# Patient Record
Sex: FEMALE | Race: WHITE | NOT HISPANIC OR LATINO | Employment: OTHER | URBAN - NONMETROPOLITAN AREA
[De-identification: names, ages, dates, MRNs, and addresses within clinical notes are randomized per-mention and may not be internally consistent; named-entity substitution may affect disease eponyms.]

---

## 2017-01-03 ENCOUNTER — TRANSCRIBE ORDERS (OUTPATIENT)
Dept: LAB | Facility: MEDICAL CENTER | Age: 82
End: 2017-01-03

## 2017-01-03 ENCOUNTER — APPOINTMENT (OUTPATIENT)
Dept: LAB | Facility: MEDICAL CENTER | Age: 82
End: 2017-01-03
Payer: MEDICARE

## 2017-01-03 ENCOUNTER — GENERIC CONVERSION - ENCOUNTER (OUTPATIENT)
Dept: OTHER | Facility: OTHER | Age: 82
End: 2017-01-03

## 2017-01-03 DIAGNOSIS — I48.91 ATRIAL FIBRILLATION (HCC): ICD-10-CM

## 2017-01-03 LAB
INR PPP: 2.35 (ref 0.86–1.16)
PROTHROMBIN TIME: 25.4 SECONDS (ref 12–14.3)

## 2017-01-03 PROCEDURE — 85610 PROTHROMBIN TIME: CPT

## 2017-01-03 PROCEDURE — 36415 COLL VENOUS BLD VENIPUNCTURE: CPT

## 2017-01-07 DIAGNOSIS — I48.91 ATRIAL FIBRILLATION (HCC): ICD-10-CM

## 2017-01-30 ENCOUNTER — TRANSCRIBE ORDERS (OUTPATIENT)
Dept: LAB | Facility: MEDICAL CENTER | Age: 82
End: 2017-01-30

## 2017-01-30 ENCOUNTER — GENERIC CONVERSION - ENCOUNTER (OUTPATIENT)
Dept: OTHER | Facility: OTHER | Age: 82
End: 2017-01-30

## 2017-01-30 ENCOUNTER — APPOINTMENT (OUTPATIENT)
Dept: LAB | Facility: MEDICAL CENTER | Age: 82
End: 2017-01-30
Payer: MEDICARE

## 2017-01-30 DIAGNOSIS — I48.91 ATRIAL FIBRILLATION (HCC): ICD-10-CM

## 2017-01-30 LAB
INR PPP: 2.08 (ref 0.86–1.16)
PROTHROMBIN TIME: 23.2 SECONDS (ref 12–14.3)

## 2017-01-30 PROCEDURE — 85610 PROTHROMBIN TIME: CPT

## 2017-01-30 PROCEDURE — 36415 COLL VENOUS BLD VENIPUNCTURE: CPT

## 2017-02-07 DIAGNOSIS — I48.91 ATRIAL FIBRILLATION (HCC): ICD-10-CM

## 2017-02-27 ENCOUNTER — APPOINTMENT (OUTPATIENT)
Dept: LAB | Facility: MEDICAL CENTER | Age: 82
End: 2017-02-27
Payer: MEDICARE

## 2017-02-27 ENCOUNTER — TRANSCRIBE ORDERS (OUTPATIENT)
Dept: LAB | Facility: MEDICAL CENTER | Age: 82
End: 2017-02-27

## 2017-02-27 DIAGNOSIS — I48.91 ATRIAL FIBRILLATION (HCC): ICD-10-CM

## 2017-02-27 LAB
INR PPP: 1.87 (ref 0.86–1.16)
PROTHROMBIN TIME: 21.4 SECONDS (ref 12–14.3)

## 2017-02-27 PROCEDURE — 36415 COLL VENOUS BLD VENIPUNCTURE: CPT

## 2017-02-27 PROCEDURE — 85610 PROTHROMBIN TIME: CPT

## 2017-02-28 ENCOUNTER — GENERIC CONVERSION - ENCOUNTER (OUTPATIENT)
Dept: OTHER | Facility: OTHER | Age: 82
End: 2017-02-28

## 2017-03-15 ENCOUNTER — ALLSCRIPTS OFFICE VISIT (OUTPATIENT)
Dept: OTHER | Facility: OTHER | Age: 82
End: 2017-03-15

## 2017-03-27 ENCOUNTER — APPOINTMENT (OUTPATIENT)
Dept: LAB | Facility: MEDICAL CENTER | Age: 82
End: 2017-03-27
Payer: MEDICARE

## 2017-03-27 ENCOUNTER — TRANSCRIBE ORDERS (OUTPATIENT)
Dept: LAB | Facility: MEDICAL CENTER | Age: 82
End: 2017-03-27

## 2017-03-27 ENCOUNTER — GENERIC CONVERSION - ENCOUNTER (OUTPATIENT)
Dept: OTHER | Facility: OTHER | Age: 82
End: 2017-03-27

## 2017-03-27 DIAGNOSIS — I48.91 ATRIAL FIBRILLATION (HCC): ICD-10-CM

## 2017-03-27 LAB
INR PPP: 1.9 (ref 0.86–1.16)
PROTHROMBIN TIME: 21.6 SECONDS (ref 12–14.3)

## 2017-03-27 PROCEDURE — 36415 COLL VENOUS BLD VENIPUNCTURE: CPT

## 2017-03-27 PROCEDURE — 85610 PROTHROMBIN TIME: CPT

## 2017-04-01 DIAGNOSIS — I48.91 ATRIAL FIBRILLATION (HCC): ICD-10-CM

## 2017-04-24 ENCOUNTER — TRANSCRIBE ORDERS (OUTPATIENT)
Dept: LAB | Facility: MEDICAL CENTER | Age: 82
End: 2017-04-24

## 2017-04-24 ENCOUNTER — APPOINTMENT (OUTPATIENT)
Dept: LAB | Facility: MEDICAL CENTER | Age: 82
End: 2017-04-24
Payer: MEDICARE

## 2017-04-24 ENCOUNTER — GENERIC CONVERSION - ENCOUNTER (OUTPATIENT)
Dept: OTHER | Facility: OTHER | Age: 82
End: 2017-04-24

## 2017-04-24 DIAGNOSIS — I48.91 ATRIAL FIBRILLATION (HCC): ICD-10-CM

## 2017-04-24 LAB
INR PPP: 2.66 (ref 0.86–1.16)
PROTHROMBIN TIME: 27.9 SECONDS (ref 12–14.3)

## 2017-04-24 PROCEDURE — 36415 COLL VENOUS BLD VENIPUNCTURE: CPT

## 2017-04-24 PROCEDURE — 85610 PROTHROMBIN TIME: CPT

## 2017-05-07 DIAGNOSIS — I48.91 ATRIAL FIBRILLATION (HCC): ICD-10-CM

## 2017-05-22 ENCOUNTER — GENERIC CONVERSION - ENCOUNTER (OUTPATIENT)
Dept: OTHER | Facility: OTHER | Age: 82
End: 2017-05-22

## 2017-05-22 ENCOUNTER — APPOINTMENT (OUTPATIENT)
Dept: LAB | Facility: MEDICAL CENTER | Age: 82
End: 2017-05-22
Payer: MEDICARE

## 2017-05-22 ENCOUNTER — TRANSCRIBE ORDERS (OUTPATIENT)
Dept: LAB | Facility: MEDICAL CENTER | Age: 82
End: 2017-05-22

## 2017-05-22 DIAGNOSIS — I48.91 ATRIAL FIBRILLATION (HCC): ICD-10-CM

## 2017-05-22 LAB
INR PPP: 2.24 (ref 0.86–1.16)
PROTHROMBIN TIME: 25 SECONDS (ref 12.1–14.4)

## 2017-05-22 PROCEDURE — 36415 COLL VENOUS BLD VENIPUNCTURE: CPT

## 2017-05-22 PROCEDURE — 85610 PROTHROMBIN TIME: CPT

## 2017-06-07 ENCOUNTER — ALLSCRIPTS OFFICE VISIT (OUTPATIENT)
Dept: OTHER | Facility: OTHER | Age: 82
End: 2017-06-07

## 2017-06-07 DIAGNOSIS — I48.91 ATRIAL FIBRILLATION (HCC): ICD-10-CM

## 2017-06-14 ENCOUNTER — GENERIC CONVERSION - ENCOUNTER (OUTPATIENT)
Dept: OTHER | Facility: OTHER | Age: 82
End: 2017-06-14

## 2017-06-19 ENCOUNTER — APPOINTMENT (OUTPATIENT)
Dept: LAB | Facility: MEDICAL CENTER | Age: 82
End: 2017-06-19
Payer: MEDICARE

## 2017-06-19 ENCOUNTER — TRANSCRIBE ORDERS (OUTPATIENT)
Dept: LAB | Facility: MEDICAL CENTER | Age: 82
End: 2017-06-19

## 2017-06-19 DIAGNOSIS — I48.91 ATRIAL FIBRILLATION (HCC): ICD-10-CM

## 2017-06-19 LAB
INR PPP: 2.24 (ref 0.86–1.16)
PROTHROMBIN TIME: 25 SECONDS (ref 12.1–14.4)

## 2017-06-19 PROCEDURE — 85610 PROTHROMBIN TIME: CPT

## 2017-06-19 PROCEDURE — 36415 COLL VENOUS BLD VENIPUNCTURE: CPT

## 2017-06-20 ENCOUNTER — GENERIC CONVERSION - ENCOUNTER (OUTPATIENT)
Dept: OTHER | Facility: OTHER | Age: 82
End: 2017-06-20

## 2017-07-17 ENCOUNTER — APPOINTMENT (OUTPATIENT)
Dept: LAB | Facility: MEDICAL CENTER | Age: 82
End: 2017-07-17
Payer: MEDICARE

## 2017-07-17 ENCOUNTER — TRANSCRIBE ORDERS (OUTPATIENT)
Dept: LAB | Facility: MEDICAL CENTER | Age: 82
End: 2017-07-17

## 2017-07-17 DIAGNOSIS — I48.91 ATRIAL FIBRILLATION (HCC): ICD-10-CM

## 2017-07-17 LAB
INR PPP: 1.8 (ref 0.86–1.16)
PROTHROMBIN TIME: 21 SECONDS (ref 12.1–14.4)

## 2017-07-17 PROCEDURE — 36415 COLL VENOUS BLD VENIPUNCTURE: CPT

## 2017-07-17 PROCEDURE — 85610 PROTHROMBIN TIME: CPT

## 2017-07-18 ENCOUNTER — GENERIC CONVERSION - ENCOUNTER (OUTPATIENT)
Dept: OTHER | Facility: OTHER | Age: 82
End: 2017-07-18

## 2017-08-01 DIAGNOSIS — I48.91 ATRIAL FIBRILLATION (HCC): ICD-10-CM

## 2017-08-14 ENCOUNTER — TRANSCRIBE ORDERS (OUTPATIENT)
Dept: LAB | Facility: MEDICAL CENTER | Age: 82
End: 2017-08-14

## 2017-08-14 ENCOUNTER — TRANSCRIBE ORDERS (OUTPATIENT)
Dept: RADIOLOGY | Facility: MEDICAL CENTER | Age: 82
End: 2017-08-14

## 2017-08-14 ENCOUNTER — APPOINTMENT (OUTPATIENT)
Dept: LAB | Facility: MEDICAL CENTER | Age: 82
End: 2017-08-14
Payer: MEDICARE

## 2017-08-14 DIAGNOSIS — I48.91 ATRIAL FIBRILLATION (HCC): ICD-10-CM

## 2017-08-14 LAB
INR PPP: 2.29 (ref 0.86–1.16)
PROTHROMBIN TIME: 25.5 SECONDS (ref 12.1–14.4)

## 2017-08-14 PROCEDURE — 85610 PROTHROMBIN TIME: CPT

## 2017-08-14 PROCEDURE — 36415 COLL VENOUS BLD VENIPUNCTURE: CPT

## 2017-08-15 ENCOUNTER — GENERIC CONVERSION - ENCOUNTER (OUTPATIENT)
Dept: OTHER | Facility: OTHER | Age: 82
End: 2017-08-15

## 2017-09-06 ENCOUNTER — ALLSCRIPTS OFFICE VISIT (OUTPATIENT)
Dept: OTHER | Facility: OTHER | Age: 82
End: 2017-09-06

## 2017-09-07 DIAGNOSIS — I48.91 ATRIAL FIBRILLATION (HCC): ICD-10-CM

## 2017-09-11 ENCOUNTER — TRANSCRIBE ORDERS (OUTPATIENT)
Dept: LAB | Facility: MEDICAL CENTER | Age: 82
End: 2017-09-11

## 2017-09-11 ENCOUNTER — APPOINTMENT (OUTPATIENT)
Dept: LAB | Facility: MEDICAL CENTER | Age: 82
End: 2017-09-11
Payer: MEDICARE

## 2017-09-11 DIAGNOSIS — I48.91 ATRIAL FIBRILLATION (HCC): ICD-10-CM

## 2017-09-11 PROCEDURE — 36415 COLL VENOUS BLD VENIPUNCTURE: CPT

## 2017-09-11 PROCEDURE — 85610 PROTHROMBIN TIME: CPT

## 2017-09-12 ENCOUNTER — GENERIC CONVERSION - ENCOUNTER (OUTPATIENT)
Dept: OTHER | Facility: OTHER | Age: 82
End: 2017-09-12

## 2017-09-12 LAB
INR PPP: 2.1 (ref 0.86–1.16)
PROTHROMBIN TIME: 23.8 SECONDS (ref 12.1–14.4)

## 2017-10-10 ENCOUNTER — APPOINTMENT (OUTPATIENT)
Dept: LAB | Facility: MEDICAL CENTER | Age: 82
End: 2017-10-10
Payer: MEDICARE

## 2017-10-10 ENCOUNTER — GENERIC CONVERSION - ENCOUNTER (OUTPATIENT)
Dept: OTHER | Facility: OTHER | Age: 82
End: 2017-10-10

## 2017-10-10 ENCOUNTER — TRANSCRIBE ORDERS (OUTPATIENT)
Dept: RADIOLOGY | Facility: MEDICAL CENTER | Age: 82
End: 2017-10-10

## 2017-10-10 DIAGNOSIS — I48.91 ATRIAL FIBRILLATION (HCC): ICD-10-CM

## 2017-10-10 LAB
INR PPP: 1.93 (ref 0.86–1.16)
PROTHROMBIN TIME: 22.2 SECONDS (ref 12.1–14.4)

## 2017-10-10 PROCEDURE — 85610 PROTHROMBIN TIME: CPT

## 2017-10-10 PROCEDURE — 36415 COLL VENOUS BLD VENIPUNCTURE: CPT

## 2017-11-06 ENCOUNTER — APPOINTMENT (OUTPATIENT)
Dept: LAB | Facility: MEDICAL CENTER | Age: 82
End: 2017-11-06
Payer: MEDICARE

## 2017-11-06 ENCOUNTER — TRANSCRIBE ORDERS (OUTPATIENT)
Dept: RADIOLOGY | Facility: MEDICAL CENTER | Age: 82
End: 2017-11-06

## 2017-11-06 DIAGNOSIS — I48.91 ATRIAL FIBRILLATION (HCC): ICD-10-CM

## 2017-11-06 LAB
INR PPP: 2.3 (ref 0.86–1.16)
PROTHROMBIN TIME: 25.6 SECONDS (ref 12.1–14.4)

## 2017-11-06 PROCEDURE — 36415 COLL VENOUS BLD VENIPUNCTURE: CPT

## 2017-11-06 PROCEDURE — 85610 PROTHROMBIN TIME: CPT

## 2017-11-07 ENCOUNTER — GENERIC CONVERSION - ENCOUNTER (OUTPATIENT)
Dept: OTHER | Facility: OTHER | Age: 82
End: 2017-11-07

## 2017-11-16 ENCOUNTER — ALLSCRIPTS OFFICE VISIT (OUTPATIENT)
Dept: OTHER | Facility: OTHER | Age: 82
End: 2017-11-16

## 2017-11-17 NOTE — PROGRESS NOTES
Assessment    1  Cellulitis of left lower extremity (682 6) (L03 116)    Plan  Atrial fibrillation    · Digoxin 125 MCG Oral Tablet; Take as directed   · Warfarin Sodium 3 MG Oral Tablet (Coumadin); take 1 tablet by mouth oncedaily  Cellulitis of left lower extremity    · LevoFLOXacin 750 MG Oral Tablet; take 1 tablet daily for 5 days MDD:5 TDD:5   · Strapping Unna Boot - POC; Status:Active - Perform Order; Requested for:16Nov2017;   frequency: : Twice weekly  Closed Compression Fracture Of L4 Vertebral Body    · Oxycodone-Acetaminophen 5-325 MG Oral Tablet; TAKE 1 TABLET EVERY 12HOURS AS NEEDED FOR PAIN  Screening for depression    · *VB-Depression Screening; Status:Complete - Retrospective Authorization;   Done:16Nov2017 12:37PM  Screening for genitourinary condition    · *VB - Urinary Incontinence Screen (Dx Z13 89 Screen for UI); Status:Complete -Retrospective Authorization;   Done: 44GBP2689 12:37PM  Screening for neurological condition    · *VB - Fall Risk Assessment  (Dx Z13 89 Screen for Neurologic Disorder);Status:Complete - Retrospective Authorization;   Done: 19UNK7723 12:37PM    Chief Complaint  Candido Harding is here today with a chief complaint of cellulitis of the left lower extremity with an area of open denuded skin about midway up the pretibial region on she is prone to these types of eruptions they occur infrequently the her daughter is well-versed in putting on wound boots will start her on an Unna boot and her daughter will change it twice weekly will also start her on antibiotic coverage for the cellulitis      History of Present Illness  HPI: see chief complaint      Review of Systems   Constitutional: No fever, no chills, feels well, no tiredness, no recent weight gain or loss  ENT: no ear ache, no loss of hearing, no nosebleeds or nasal discharge, no sore throat or hoarseness    Cardiovascular: no complaints of slow or fast heart rate, no chest pain, no palpitations, no leg claudication or lower extremity edema  Respiratory: no complaints of shortness of breath, no wheezing, no dyspnea on exertion, no orthopnea or PND  Gastrointestinal: no complaints of abdominal pain, no constipation, no nausea or diarrhea, no vomiting, no bloody stools  Genitourinary: no complaints of dysuria, no incontinence, no pelvic pain, no dysmenorrhea, no vaginal discharge or abnormal vaginal bleeding  Musculoskeletal: no complaints of arthralgia, no myalgia, no joint swelling or stiffness, no limb pain or swelling  Integumentary: as noted in HPI  ROS reviewed  Active Problems  1  Advance directive on file (V49 89) (Z78 9)   2  Ambulatory dysfunction (719 7) (R26 2)   3  Anxiety (300 00) (F41 9)   4  Atrial fibrillation (427 31) (I48 91)   5  Avitaminosis D (268 9) (E55 9)   6  Chronic venous stasis dermatitis of both lower extremities (454 1) (I87 2)   7  Closed Compression Fracture Of L4 Vertebral Body (805 4)   8  Dermatitis (692 9) (L30 9)   9  Exercise counseling (V65 41) (Z71 82)   10  Glaucoma screening (V80 1) (Z13 5)   11  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)   12  Hypertension (401 9) (I10)   13  Need for influenza vaccination (V04 81) (Z23)   14  Never a smoker   15  Primary osteoarthritis of both knees (715 16) (M17 0)   16  Routine eye exam (V72 0) (Z01 00)   17  Screening for depression (V79 0) (Z13 89)   18  Screening for genitourinary condition (V81 6) (Z13 89)   19  Screening for neurological condition (V80 09) (Z13 89)   20  Venous stasis dermatitis (454 1) (I87 2)    Past Medical History  1  History of Abscess or cellulitis of leg (682 6) (L02 419,L03 119)   2  History of Allergic drug reaction (995 27) (T78 40XA)   3  Anticoagulant long-term use (V58 61) (Z79 01)   4  History of Cellulitis (682 9) (L03 90)   5  Common cold (460) (J00)   6  History of Fungal infection (117 9) (B49)   7  History of Pancreatitis (577 0) (K85 90)   8  History of Stasis dermatitis (454 1) (I83 10)   9   History of Wrist Sprain (842 00)  Active Problems And Past Medical History Reviewed: The active problems and past medical history were reviewed and updated today  Family History  Mother    1  Family history of Coronary artery disease (414 00) (I25 10)  Father    2  Family history of Prostate cancer ([address] [place]) (C61)  Sister    3  Family history of Type 2 diabetes mellitus (250 00) (E11 9)  Family History Reviewed: The family history was reviewed and updated today  Social History   · Advance directive on file (Y53 28) (Z78 9)   · Denied: Drug use (305 90) (F19 90)   · Never a smoker   · Never Drank Alcohol   · Patient has living will (V49 89) (Z78 9)  The social history was reviewed and updated today  The social history was reviewed and is unchanged  Surgical History    1  History of Cholecystectomy   2  History of Oral Surgery Tooth Extraction  Surgical History Reviewed: The surgical history was reviewed and updated today  Current Meds   1  AmLODIPine Besylate 5 MG Oral Tablet; TAKE 1 TABLET BY MOUTH ONCE DAILY; Therapy: 27MXY9731 to (Evaluate:12Mar2018)  Requested for: 45Ebt7088; Last Rx:76Yhy5236 Ordered   2  Benadryl 25 MG TABS; Take 1 tablet twice daily; Therapy: 35GDJ1701 to (Evaluate:16Apr2015)  Requested for: 04 27 17 75 15; Last Rx:08Apr2015 Ordered   3  CeraVe SA Renewing CREA; APPLY SPARINGLY TO AFFECTED AREA(S) TWICE DAILY Recorded   4  Digoxin 125 MCG Oral Tablet; Take as directed; Therapy: 24SVP7345 to (Evaluate:01Apr2017)  Requested for: 44FEY0948; Last Rx:75Wuj8075 Ordered   5  DilTIAZem HCl ER Coated Beads 120 MG Oral Capsule Extended Release 24 Hour; take 1 capsule by mouth once daily; Therapy: 59PNN0009 to (Evaluate:10Mar2018)  Requested for: 77Ecp1753; Last Rx:71Adt3499 Ordered   6  Enalapril Maleate 10 MG Oral Tablet; TAKE 1 TABLET TWICE DAILY AS DIRECTED; Therapy: 74DGG5487 to (Evaluate:10Mar2018)  Requested for: 82Xho2414; Last Rx:43Fam0675 Ordered   7   Furosemide 40 MG Oral Tablet; TAKE 1 TABLET BY MOUTH ONCE DAILY; Therapy: 94KDJ3837 to (Evaluate:10Mar2018)  Requested for: 87Qty9081; Last Rx:80Oyz2320 Ordered   8  Klor-Con M20 20 MEQ Oral Tablet Extended Release; TAKE 1 TABLET BY MOUTH ONCE DAILY; Therapy: 36TEW4763 to (Evaluate:10Mar2018)  Requested for: 19Hdp8762; Last Rx:45Qda5987 Ordered   9  Metoprolol Tartrate 25 MG Oral Tablet; Take 1 tablet twice a day; Therapy: 30UJH3539 to (Evaluate:10Mar2018)  Requested for: 46Jby8056; Last Rx:40Zca8182 Ordered   10  Oxycodone-Acetaminophen 5-325 MG Oral Tablet; TAKE 1 TABLET EVERY 12 HOURS  AS NEEDED FOR PAIN;  Therapy: 36RVL7922 to (Evaluate:09Nov2017); Last Rx:10Oct2017 Ordered   11  Slow Magnesium/Calcium  MG TBEC; 1 PO QD Recorded   12  Triamcinolone Acetonide 0 1 % External Cream; APPLY  AND RUB  IN A THIN FILM TO  AFFECTED AREAS TWICE DAILY  (AM AND PM)  Requested for: 01PIL5048; Last  Rx:07Jun2017 Ordered   13  Warfarin Sodium 3 MG Oral Tablet (Coumadin); take 1 tablet by mouth once daily; Therapy: 93CTR8397 to [phone]  Requested for: 24Apr2017; Last  Rx:02Zat0211 Ordered    The medication list was reviewed and updated today  Allergies  1  Azithromycin PACK   2  Keflex CAPS   3  Mupirocin Calcium CREA    Vitals   Recorded: 04UPK3832 16:30NO   Systolic 418, LUE, Sitting    Diastolic 62, LUE, Sitting    Height 5 ft 5 in    Weight 125 lb     BMI Calculated 20 8    BSA Calculated 1 62    Patient Refused Weight Yes Yes       Physical Exam   Constitutional  General appearance: No acute distress, well appearing and well nourished  Eyes  Conjunctiva and lids: No swelling, erythema or discharge  Pupils and irises: Equal, round and reactive to light  Ears, Nose, Mouth, and Throat  External inspection of ears and nose: Normal    Otoscopic examination: Tympanic membranes translucent with normal light reflex  Canals patent without erythema  Nasal mucosa, septum, and turbinates: Normal without edema or erythema     Oropharynx: Normal with no erythema, edema, exudate or lesions  Pulmonary  Respiratory effort: No increased work of breathing or signs of respiratory distress  Auscultation of lungs: Clear to auscultation  Cardiovascular  Palpation of heart: Normal PMI, no thrills  Auscultation of heart: Normal rate and rhythm, normal S1 and S2, without murmurs  Examination of extremities for edema and/or varicosities: Normal    Carotid pulses: Normal    Abdomen  Abdomen: Non-tender, no masses  Liver and spleen: No hepatomegaly or splenomegaly  Lymphatic  Palpation of lymph nodes in neck: No lymphadenopathy  Musculoskeletal  Gait and station: Normal    Digits and nails: Normal without clubbing or cyanosis  Inspection/palpation of joints, bones, and muscles: Normal    Skin  Skin and subcutaneous tissue: Abnormal  -- induration redness from ankle to knee right leg with open area of skin denuded in the mid tibial area  Neurologic  Cranial nerves: Cranial nerves 2-12 intact  Reflexes: 2+ and symmetric  Sensation: No sensory loss  Psychiatric  Orientation to person, place, and time: Normal    Mood and affect: Normal          Results/Data  PHQ-2 Adult Depression Screening 64KTF3064 12:38PM User, [employer]     Test Name Result Flag Reference   PHQ-2 Adult Depression Score 0       Over the last two weeks, how often have you been bothered by any of the following problems?  Little interest or pleasure in doing things: Not at all - 0 Feeling down, depressed, or hopeless: Not at all - 0   PHQ-2 Adult Depression Screening Negative       Falls Risk Assessment (Dx Z13 89 Screen for Neurologic Disorder) 06CMO4003 12:38PM User, Ahs     Test Name Result Flag Reference   Falls Risk      No falls in the past year     *VB - Fall Risk Assessment  (Dx Z13 89 Screen for Neurologic Disorder) 68AGE7222 12:37PM Adelia Savin     Test Name Result Flag Reference   Falls Risk      No falls in the past year     *VB - Urinary Incontinence Screen (Dx Z13 89 Screen for UI) 26XNG6428 12:37PM [employer]     Test Name Result Flag Reference   Urinary Incontinence Assessment 06RUS5926       *VB-Depression Screening 36ELQ6411 12:37PM [employer]     Test Name Result Flag Reference   Depression Scale Result      Depression Screen - Negative For Symptoms         Signatures   Electronically signed by : Nae Sherwood DO; Nov 16 2017  1:14PM EST                       (Author)

## 2017-12-04 ENCOUNTER — TRANSCRIBE ORDERS (OUTPATIENT)
Dept: RADIOLOGY | Facility: MEDICAL CENTER | Age: 82
End: 2017-12-04

## 2017-12-04 ENCOUNTER — LAB CONVERSION - ENCOUNTER (OUTPATIENT)
Dept: OTHER | Facility: OTHER | Age: 82
End: 2017-12-04

## 2017-12-04 ENCOUNTER — APPOINTMENT (OUTPATIENT)
Dept: LAB | Facility: MEDICAL CENTER | Age: 82
End: 2017-12-04
Payer: MEDICARE

## 2017-12-04 DIAGNOSIS — I48.91 ATRIAL FIBRILLATION, UNSPECIFIED TYPE (HCC): Primary | ICD-10-CM

## 2017-12-04 DIAGNOSIS — I48.91 ATRIAL FIBRILLATION, UNSPECIFIED TYPE (HCC): ICD-10-CM

## 2017-12-04 LAB
INR PPP: 2.32 (ref 0.86–1.16)
PROTHROMBIN TIME: 25.7 SECONDS (ref 12.1–14.4)

## 2017-12-04 PROCEDURE — 85610 PROTHROMBIN TIME: CPT

## 2017-12-04 PROCEDURE — 36415 COLL VENOUS BLD VENIPUNCTURE: CPT

## 2017-12-05 ENCOUNTER — GENERIC CONVERSION - ENCOUNTER (OUTPATIENT)
Dept: OTHER | Facility: OTHER | Age: 82
End: 2017-12-05

## 2017-12-21 ENCOUNTER — TRANSCRIBE ORDERS (OUTPATIENT)
Dept: ADMINISTRATIVE | Facility: HOSPITAL | Age: 82
End: 2017-12-21

## 2017-12-21 ENCOUNTER — APPOINTMENT (OUTPATIENT)
Dept: WOUND CARE | Facility: HOSPITAL | Age: 82
End: 2017-12-21
Payer: MEDICARE

## 2017-12-21 DIAGNOSIS — I87.312 IDIOPATHIC CHRONIC VENOUS HYPERTENSION OF LEFT LOWER EXTREMITY WITH ULCER (HCC): Primary | ICD-10-CM

## 2017-12-21 DIAGNOSIS — L97.929 IDIOPATHIC CHRONIC VENOUS HYPERTENSION OF LEFT LOWER EXTREMITY WITH ULCER (HCC): Primary | ICD-10-CM

## 2017-12-21 PROCEDURE — 99213 OFFICE O/P EST LOW 20 MIN: CPT

## 2017-12-21 PROCEDURE — 11042 DBRDMT SUBQ TIS 1ST 20SQCM/<: CPT

## 2017-12-26 ENCOUNTER — HOSPITAL ENCOUNTER (OUTPATIENT)
Dept: ULTRASOUND IMAGING | Facility: HOSPITAL | Age: 82
Discharge: HOME | End: 2017-12-26
Attending: PODIATRIST
Payer: MEDICARE

## 2017-12-26 ENCOUNTER — GENERIC CONVERSION - ENCOUNTER (OUTPATIENT)
Dept: OTHER | Facility: OTHER | Age: 82
End: 2017-12-26

## 2017-12-26 DIAGNOSIS — L97.929 IDIOPATHIC CHRONIC VENOUS HYPERTENSION OF LEFT LOWER EXTREMITY WITH ULCER (HCC): ICD-10-CM

## 2017-12-26 DIAGNOSIS — I87.312 IDIOPATHIC CHRONIC VENOUS HYPERTENSION OF LEFT LOWER EXTREMITY WITH ULCER (HCC): ICD-10-CM

## 2017-12-26 PROCEDURE — 93971 EXTREMITY STUDY: CPT

## 2017-12-29 ENCOUNTER — GENERIC CONVERSION - ENCOUNTER (OUTPATIENT)
Dept: FAMILY MEDICINE CLINIC | Facility: CLINIC | Age: 82
End: 2017-12-29

## 2018-01-01 ENCOUNTER — APPOINTMENT (OUTPATIENT)
Dept: LAB | Facility: MEDICAL CENTER | Age: 83
End: 2018-01-01
Payer: MEDICARE

## 2018-01-01 ENCOUNTER — ANTICOAG VISIT (OUTPATIENT)
Dept: FAMILY MEDICINE CLINIC | Facility: CLINIC | Age: 83
End: 2018-01-01

## 2018-01-01 DIAGNOSIS — Z92.29 HISTORY OF COUMADIN THERAPY: Primary | ICD-10-CM

## 2018-01-01 DIAGNOSIS — M25.50 ARTHRALGIA, UNSPECIFIED JOINT: ICD-10-CM

## 2018-01-01 DIAGNOSIS — I48.21 PERMANENT ATRIAL FIBRILLATION (HCC): ICD-10-CM

## 2018-01-01 RX ORDER — OXYCODONE HYDROCHLORIDE AND ACETAMINOPHEN 5; 325 MG/1; MG/1
1 TABLET ORAL EVERY 12 HOURS PRN
Qty: 60 TABLET | Refills: 0 | Status: SHIPPED | OUTPATIENT
Start: 2018-01-01 | End: 2019-01-01 | Stop reason: SDUPTHER

## 2018-01-01 RX ORDER — WARFARIN SODIUM 3 MG/1
3 TABLET ORAL
Qty: 30 TABLET | Refills: 5 | Status: SHIPPED | OUTPATIENT
Start: 2018-01-01 | End: 2019-01-01 | Stop reason: SDUPTHER

## 2018-01-01 RX ORDER — WARFARIN SODIUM 1 MG/1
TABLET ORAL
Qty: 15 TABLET | Refills: 1 | Status: SHIPPED | OUTPATIENT
Start: 2018-01-01 | End: 2019-01-01 | Stop reason: HOSPADM

## 2018-01-02 ENCOUNTER — APPOINTMENT (OUTPATIENT)
Dept: LAB | Facility: MEDICAL CENTER | Age: 83
End: 2018-01-02
Payer: MEDICARE

## 2018-01-02 ENCOUNTER — TRANSCRIBE ORDERS (OUTPATIENT)
Dept: RADIOLOGY | Facility: MEDICAL CENTER | Age: 83
End: 2018-01-02

## 2018-01-02 DIAGNOSIS — I48.91 ATRIAL FIBRILLATION, UNSPECIFIED TYPE (HCC): Primary | ICD-10-CM

## 2018-01-02 DIAGNOSIS — I48.91 ATRIAL FIBRILLATION, UNSPECIFIED TYPE (HCC): ICD-10-CM

## 2018-01-02 LAB
INR PPP: 3.14 (ref 0.86–1.16)
PROTHROMBIN TIME: 32.7 SECONDS (ref 12.1–14.4)

## 2018-01-02 PROCEDURE — 85610 PROTHROMBIN TIME: CPT

## 2018-01-02 PROCEDURE — 36415 COLL VENOUS BLD VENIPUNCTURE: CPT

## 2018-01-03 ENCOUNTER — GENERIC CONVERSION - ENCOUNTER (OUTPATIENT)
Dept: OTHER | Facility: OTHER | Age: 83
End: 2018-01-03

## 2018-01-04 ENCOUNTER — GENERIC CONVERSION - ENCOUNTER (OUTPATIENT)
Dept: OTHER | Facility: OTHER | Age: 83
End: 2018-01-04

## 2018-01-09 NOTE — RESULT NOTES
Verified Results  (1) PT WITH INR 25KMY7862 11:25AM Rajiv Leon     Test Name Result Flag Reference   INR 2 59 H 0 86-1 16   PT 26 5 seconds H 12 0-14 3

## 2018-01-10 NOTE — RESULT NOTES
Verified Results  (1) PT WITH INR 11Ohj8589 11:17AM Maximus Gardiner     Test Name Result Flag Reference   INR 2 73 H 0 86-1 16   PT 27 3 seconds H 11 8-14 1

## 2018-01-10 NOTE — RESULT NOTES
Verified Results  (1) PT WITH INR 05Xhw3538 11:17AM Jett Hatch    Order Number: [id number]     Test Name Result Flag Reference   INR 1 80 H 0 86-1 16   PT 21 0 seconds H 12 1-14 4

## 2018-01-10 NOTE — RESULT NOTES
Verified Results  (1) PT WITH INR 63Djl5294 11:33AM Tanvi Robert   TW Order Number: [id number]     Test Name Result Flag Reference   INR 2 10 H 0 86-1 16   Processed after prolonged delay  This is an appended report  These results have been appended to a previously final verified report  PT 23 8 seconds H 12 1-14 4   Processed after prolonged delay

## 2018-01-11 ENCOUNTER — APPOINTMENT (OUTPATIENT)
Dept: WOUND CARE | Facility: HOSPITAL | Age: 83
End: 2018-01-11
Payer: MEDICARE

## 2018-01-11 PROCEDURE — 11042 DBRDMT SUBQ TIS 1ST 20SQCM/<: CPT

## 2018-01-11 NOTE — RESULT NOTES
Verified Results  (1) PT WITH INR 56Ddr0439 11:30AM Shannen Trammell     Test Name Result Flag Reference   INR 1 91 H 0 86-1 16   PT 21 7 seconds H 12 0-14 3

## 2018-01-11 NOTE — RESULT NOTES
Verified Results  (1) PT WITH INR 30Jan2017 11:41AM Den Schwab    Order Number: [id number]     Test Name Result Flag Reference   INR 2 08 H 0 86-1 16   PT 23 2 seconds H 12 0-14 3

## 2018-01-11 NOTE — RESULT NOTES
Verified Results  (1) PT WITH INR 12Nci5832 11:11AM Moris Andres     Test Name Result Flag Reference   INR 2 17 H 0 86-1 16   PT 23 0 seconds H 11 8-14 1

## 2018-01-12 VITALS
BODY MASS INDEX: 20.83 KG/M2 | WEIGHT: 125 LBS | HEIGHT: 65 IN | SYSTOLIC BLOOD PRESSURE: 138 MMHG | DIASTOLIC BLOOD PRESSURE: 68 MMHG

## 2018-01-12 VITALS
HEIGHT: 65 IN | SYSTOLIC BLOOD PRESSURE: 138 MMHG | DIASTOLIC BLOOD PRESSURE: 84 MMHG | BODY MASS INDEX: 20.83 KG/M2 | WEIGHT: 125 LBS

## 2018-01-12 NOTE — RESULT NOTES
Verified Results  (1) PT WITH INR 12BGP5439 11:35AM Itzel Cheadle    Order Number: [id number]     Test Name Result Flag Reference   INR 2 35 H 0 86-1 16   PT 25 4 seconds H 12 0-14 3

## 2018-01-12 NOTE — RESULT NOTES
Verified Results  (1) PT WITH INR 46AID0966 11:43AM Álvaro Degree   TW Order Number: [id number]     Test Name Result Flag Reference   INR 2 06 H 0 86-1 16   PT 23 0 seconds H 12 0-14 3

## 2018-01-12 NOTE — RESULT NOTES
Verified Results  (1) PT WITH INR 59GZG5911 11:19AM Arnulfo Scot     Test Name Result Flag Reference   INR 2 36 H 0 86-1 16   PT 24 5 seconds H 11 8-14 1

## 2018-01-12 NOTE — PROGRESS NOTES
Assessment    1  Never a smoker   2  Routine eye exam (V72 0) (Z01 00)   3  Screening for neurological condition (V80 09) (Z13 89)   4  Screening for depression (V79 0) (Z13 89)   5  Screening for genitourinary condition (V81 6) (Z13 89)   6  Encounter for preventive health examination (V70 0) (Z00 00)    Plan  Glaucoma screening, Routine eye exam, Screening for neurological condition    · *VB Glaucoma Screen; Status:Unauthorized - Requires Signature; Requested  for:26Vih3779; Health Maintenance    · Medicare Annual Wellness Visit ; every 1 year; Last 44YUL7456; Next 95HJU7477;  Status:Active  Routine eye exam    · *VB - Eye Exam; Status:Active - Retrospective By Protocol Authorization; Requested  for:31Zox7005;   Screening for depression    · *VB-Depression Screening; Status:Complete;   Done: 44ARC7897 11:19AM  Screening for genitourinary condition    · *VB-Urinary Incontinence Screen (Dx V81 6 Screen for UI); Status:Complete -  Retrospective By Protocol Authorization;   Done: 05RFH1842 11:20AM  Screening for neurological condition    · *VB - Fall Risk Assessment  (Dx V80 09 Screen for Neurologic Disorder);  Status:Resulted - Requires Verification;   Done: 76SJG4025 11:18AM    Discussion/Summary  Impression: Subsequent Annual Wellness Visit  Cardiovascular screening and counseling: the risks and benefits of screening were discussed and screening is current  Diabetes screening and counseling: the risks and benefits of screening were discussed and screening is current  Colorectal cancer screening and counseling: the risks and benefits of screening were discussed and screening not indicated  Breast cancer screening and counseling: the risks and benefits of screening were discussed and screening not indicated  Cervical cancer screening and counseling: the risks and benefits of screening were discussed and screening not indicated     Abdominal aortic aneurysm screening and counseling: the risks and benefits of screening were discussed and screening not indicated  Glaucoma screening and counseling: the risks and benefits of screening were discussed and the patient declines screening  History of Present Illness  The patient is being seen for the subsequent annual wellness visit  Medicare Screening and Risk Factors   Hospitalizations: no previous hospitalizations  Once per lifetime medicare screening tests: ECG has not been done and AAA screening US has not yet been done  Medicare Screening Tests Risk Questions   Abdominal aortic aneurysm risk assessment: none indicated  Osteoporosis risk assessment: female gender, over 48years of age and no dexa  HIV risk assessment: none indicated  Drug and Alcohol Use: The patient has never smoked cigarettes  The patient reports never drinking alcohol  She has never used illicit drugs  Diet and Physical Activity: Current diet includes well balanced meals and limited junk food  The patient does not exercise  Exercise: walking  Mood Disorder and Cognitive Impairment Screening: She denies feeling down, depressed, or hopeless over the past two weeks  She denies feeling little interest or pleasure in doing things over the past two weeks  Cognitive impairment screening: denies difficulty learning/retaining new information, denies difficulty handling complex tasks, denies difficulty with reasoning, denies difficulty with spatial ability and orientation, denies difficulty with language and denies difficulty with behavior  Functional Ability/Level of Safety: Hearing is significantly decreased and a hearing aid is not used  She denies hearing difficulties  The patient is currently able to do activities of daily living with limitations, able to do instrumental activities of daily living with limitations, able to participate in social activities with limitations and unable to drive   Activities of daily living details: needs help using the phone, transportation help needed, needs help shopping, meal preparation help needed, needs help doing housework, needs help doing laundry, needs help managing medications, needs help managing money and needs met by family  Fall risk factors:  mobility impairment, visual impairment, up and go test was unsteady or greater than thirty seconds and wheelchair bound, but no polypharmacy, no alcohol use, no antidepressant use, no deconditioning, no postural hypotension, no sedative use, no urinary incontinence, no antihypertensive use, no cognitive impairment and no previous fall  Home safety risk factors:  no grab bars in the bathroom and no handrails on the stairs, but no unfamiliar surroundings, no loose rugs, no poor household lighting, no uneven floors and no household clutter  Advance Directives: Advance directives: living will, durable power of  for health care directives and advance directives  Co-Managers and Medical Equipment/Suppliers: See Patient Care Team      Review of Systems    Constitutional: negative  Head and Face: negative  Eyes: negative  ENT: negative  Cardiovascular: negative  Respiratory: negative  Gastrointestinal: negative  Genitourinary: negative  Musculoskeletal: diffuse joint pain  Integumentary and Breasts: negative  Neurological: negative  Psychiatric: negative  Endocrine: negative  Hematologic and Lymphatic: negative  Active Problems    1  Ambulatory dysfunction (719 7) (R26 2)   2  Anxiety (300 00) (F41 9)   3  Atrial fibrillation (427 31) (I48 91)   4  Avitaminosis D (268 9) (E55 9)   5  Chronic venous stasis dermatitis of both lower extremities (454 1) (I83 11,I83 12)   6  Closed Compression Fracture Of L4 Vertebral Body (805 4)   7  Dermatitis (692 9) (L30 9)   8  Glaucoma screening (V80 1) (Z13 5)   9  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)   10  Hypertension (401 9) (I10)   11  Need for influenza vaccination (V04 81) (Z23)   12  Never a smoker   13   Venous stasis dermatitis (454 1) (I83 10)    Past Medical History    1  History of Abscess or cellulitis of leg (682 6) (L02 419,L03 119)   2  History of Allergic drug reaction (995 29,E947 9) (T78 40XA)   3  Anticoagulant long-term use (V58 61) (Z79 01)   4  History of Cellulitis (682 9) (L03 90)   5  Common cold (460) (J00)   6  History of Fungal infection (117 9) (B49)   7  History of Pancreatitis (577 0) (K85 9)   8  History of Stasis dermatitis (454 1) (I83 10)   9  History of Wrist Sprain (842 00)    The active problems and past medical history were reviewed and updated today  Surgical History    1  History of Cholecystectomy   2  History of Oral Surgery Tooth Extraction    The surgical history was reviewed and updated today  Family History  Mother    1  Family history of Coronary artery disease (414 00) (I25 10)  Father    2  Family history of Prostate cancer (80) (C61)  Sister    3  Family history of Type 2 diabetes mellitus (250 00) (E11 9)    The family history was reviewed and updated today  Social History    · Denied: Drug use (305 90) (F19 90)   · Never a smoker   · Never Drank Alcohol   · Patient has living will (V49 89) (Z78 9)  The social history was reviewed and updated today  The social history was reviewed and is unchanged  Current Meds   1  AmLODIPine Besylate 5 MG Oral Tablet; TAKE 1 TABLET BY MOUTH ONCE DAILY; Therapy: 74RNN8524 to (Evaluate:73Nsg5902)  Requested for: 41SVA9881; Last   Rx:02Mar2016 Ordered   2  Benadryl 25 MG Oral Tablet; Take 1 tablet twice daily; Therapy: 66CLU4791 to (Evaluate:16Apr2015)  Requested for: 04 27 17 75 15; Last   Rx:08Apr2015 Ordered   3  CeraVe SA Renewing CREA; APPLY SPARINGLY TO AFFECTED AREA(S) TWICE DAILY   Recorded   4  Digoxin 125 MCG Oral Tablet; Take as directed; Therapy: 85LUQ1612 to (Evaluate:17Jan2016)  Requested for: 06Zsq8044; Last   Rx:43Lhj7461 Ordered   5   Diltiazem HCl ER Coated Beads 120 MG Oral Capsule Extended Release 24 Hour; take   1 capsule by mouth once daily; Therapy: 73QDS2059 to (Evaluate:73Eoc5101)  Requested for: 82DUP4254; Last   Rx:02Mar2016 Ordered   6  Enalapril Maleate 10 MG Oral Tablet; TAKE 1 TABLET TWICE DAILY AS DIRECTED; Therapy: 68HXL4872 to 9615 9032)  Requested for: 31HYA8456; Last   Rx:02May2016 Ordered   7  Furosemide 40 MG Oral Tablet; TAKE 1 TABLET BY MOUTH ONCE DAILY; Therapy: 15BPS5997 to (Evaluate:74Oiv7842)  Requested for: 77YKS6740; Last   Rx:02Mar2016 Ordered   8  Klor-Con M20 20 MEQ Oral Tablet Extended Release; TAKE 1 TABLET BY MOUTH ONCE   DAILY; Therapy: 06OTK5642 to (Evaluate:29Aug2016)  Requested for: 10ZUL4155; Last   Rx:02Mar2016 Ordered   9  Metoprolol Tartrate 25 MG Oral Tablet; Take 1 tablet twice a day; Therapy: 79EVX6848 to (Evaluate:12Aeg6081)  Requested for: 32HWO7781; Last   Rx:02Mar2016 Ordered   10  Oxycodone-Acetaminophen 5-325 MG Oral Tablet; TAKE 1 TABLET EVERY 12 HOURS AS    NEEDED FOR PAIN;    Therapy: 71GEJ3144 to (Evaluate:67Xst5449); Last Rx:13Jun2016 Ordered   11  Slow Magnesium/Calcium  MG Oral Tablet Delayed Release; 1 PO QD Recorded   12  Triamcinolone Acetonide 0 1 % External Cream; APPLY  AND RUB  IN A THIN FILM TO    AFFECTED AREAS TWICE DAILY  (AM AND PM) Recorded   13  Warfarin Sodium 3 MG Oral Tablet; take 1 tablet by mouth once daily; Therapy: 80OTP2742 to (Evaluate:64Tms5536)  Requested for: 77TZV8857; Last    Rx:31Jan2016 Ordered    The medication list was reviewed and updated today  Allergies    1  Azithromycin PACK   2  Keflex CAPS   3  Mupirocin Calcium CREA    Immunizations  Influenza --- Jeff Cheniden: Permanently Deferred: Pt refuses, 55PKC7004; Javi Saha: Temporarily Deferred:  Pt refuses;  Zaida Kolb: Temporarily Deferred: Pt refuses   PNEUMO --- Jeff Cheniden: Unknown     Vitals  Signs [Data Includes: Current Encounter]   Recorded: 05GFV3024 23:92UJ   Systolic: 158, LUE, Sitting  Diastolic: 76, LUE, Sitting  Height: 5 ft 5 in  Weight: 125 lb BMI Calculated: 20 8  BSA Calculated: 1 62    Results/Data  Encounter Results   *VB-Urinary Incontinence Screen (Dx V81 6 Screen for UI) 39BTC7942 11:20AM Mary Jo Reyna     Test Name Result Flag Reference   Urinary Incontinence Assessment 16SJY7879       Falls Risk Assessment (Dx V80 09 Screen for Neurologic Disorder) 73AYE5549 11:19AM User, Ahs     Test Name Result Flag Reference   Falls Risk      Falls with injury in the past year     PHQ-2 Adult Depression Screening 08Gre9585 11:19AM User, Ahs     Test Name Result Flag Reference   PHQ-2 Adult Depression Score 0     Over the last two weeks, how often have you been bothered by any of the following problems? Little interest or pleasure in doing things: Not at all - 0  Feeling down, depressed, or hopeless: Not at all - 0   PHQ-2 Adult Depression Screening Negative       *VB-Depression Screening 26LPL3884 11:19AM Mary Jo Reyna     Test Name Result Flag Reference   Depression Scale Result      Depression Screen - Negative For Symptoms     *VB - Fall Risk Assessment  (Dx V80 09 Screen for Neurologic Disorder) 08PXU5836 11:18AM Mary Jo Reyna     Test Name Result Flag Reference   Fall Risk Assessment [address] Maintenance Medicare Annual Wellness Visit; every 1 year; Last 71GYA0959; Next Due: 83SVD3587;  Active    Signatures   Electronically signed by : Rima Ruth DO; Jul 6 2016 11:40AM EST                       (Author)

## 2018-01-12 NOTE — RESULT NOTES
Verified Results  (1) PT WITH INR 58Wnk3332 11:36AM [facility] Order Number: [id number]     Test Name Result Flag Reference   INR 1 93 H 0 86-1 16   PT 22 2 seconds H 12 1-14 4

## 2018-01-13 NOTE — RESULT NOTES
Verified Results  (1) PT WITH INR 76Vwy6713 11:10AM Lorin Cheadle TW Order Number: [id number]     Test Name Result Flag Reference   INR 2 29 H 0 86-1 16   PT 25 5 seconds H 12 1-14 4

## 2018-01-13 NOTE — RESULT NOTES
Verified Results  (1) COMPREHENSIVE METABOLIC PANEL [id number] 84:71FF [place]    Order Number: [id number]   Order Number: [id number] Order Number: [id number]     Test Name Result Flag Reference   GLUCOSE,RANDM 96 mg/dL     If the patient is fasting, the ADA then defines impaired fasting glucose as > 100 mg/dL and diabetes as > or equal to 123 mg/dL  SODIUM 140 mmol/L  136-145   POTASSIUM 3 7 mmol/L  3 5-5 3   CHLORIDE 105 mmol/L  100-108   CARBON DIOXIDE 24 mmol/L  21-32   ANION GAP (CALC) 11 mmol/L  4-13   BLOOD UREA NITROGEN 10 mg/dL  5-25   CREATININE 0 80 mg/dL  0 60-1 30   Standardized to IDMS reference method   CALCIUM 8 3 mg/dL  8 3-10 1   BILI, TOTAL 0 50 mg/dL  0 20-1 00   ALK PHOSPHATAS 118 U/L H    ALT (SGPT) 25 U/L  12-78   AST(SGOT) 20 U/L  5-45   ALBUMIN 3 5 g/dL  3 5-5 0   TOTAL PROTEIN 7 1 g/dL  6 4-8 2   eGFR Non-African American      >60 0 ml/min/1 73sq m   [facility] [employer] Disease Education Program recommendations are as follows:  GFR calculation is accurate only with a steady state creatinine  Chronic Kidney disease less than 60 ml/min/1 73 sq  meters  Kidney failure less than 15 ml/min/1 73 sq  meters       (1) TSH [id number] 11:19AM Samson Zaheer    Order Number: [id number]   Order Number: [id number] Order Number: [id number]     Test Name Result Flag Reference   TSH 1 838 uIU/mL  0 358-3 740   The recommended reference ranges for TSH during pregnancy are as follows:  First trimester 0 1 to 2 5 uIU/mL  Second trimester  0 2 to 3 0 uIU/mL  Third trimester 0 3 to 3 0 uIU/m

## 2018-01-13 NOTE — RESULT NOTES
Verified Results  (1) PT WITH INR [id number] 11:14AM [facility]     Test Name Result Flag Reference   INR 2 06 H 0 86-1 16   PT 22 3 seconds H 12 0-14 3

## 2018-01-13 NOTE — RESULT NOTES
Verified Results  (1) PT WITH INR [id number] 11:03AM Álvaro Degree     Test Name Result Flag Reference   INR 2 40 H 0 86-1 16   PT 24 8 seconds H 11 8-14 1

## 2018-01-14 VITALS
DIASTOLIC BLOOD PRESSURE: 72 MMHG | SYSTOLIC BLOOD PRESSURE: 138 MMHG | WEIGHT: 125 LBS | BODY MASS INDEX: 20.83 KG/M2 | HEIGHT: 65 IN

## 2018-01-14 VITALS
DIASTOLIC BLOOD PRESSURE: 62 MMHG | SYSTOLIC BLOOD PRESSURE: 120 MMHG | WEIGHT: 125 LBS | BODY MASS INDEX: 20.83 KG/M2 | HEIGHT: 65 IN

## 2018-01-14 NOTE — RESULT NOTES
Verified Results  (1) PT WITH INR [id number] 11:10AM Abimael Tabor    Order Number: [id number]     Test Name Result Flag Reference   INR 2 24 H 0 86-1 16   PT 25 0 seconds H 12 1-14 4

## 2018-01-14 NOTE — RESULT NOTES
Verified Results  (1) PT WITH INR [id number] 11:44AM Ramón Chew     Test Name Result Flag Reference   INR 2 37 H 0 86-1 16   PT 24 6 seconds H 11 8-14 1

## 2018-01-15 NOTE — RESULT NOTES
Verified Results  (1) PT WITH INR [id number] 11:17AM Dora HALL Order Number: [id number]     Test Name Result Flag Reference   INR 2 24 H 0 86-1 16   PT 25 0 seconds H 12 1-14 4

## 2018-01-16 NOTE — RESULT NOTES
Verified Results  (1) PT WITH INR [id number] 11:28AM Lidia Narvaez    Order Number: [id number]     Test Name Result Flag Reference   INR 2 66 H 0 86-1 16   PT 27 9 seconds H 12 0-14 3

## 2018-01-16 NOTE — RESULT NOTES
Verified Results  (1) PT WITH INR [id number] 11:36AM [email or web address] Order Number: [id number]     Test Name Result Flag Reference   INR 2 12 H 0 86-1 16   PT 23 5 seconds H 12 0-14 3

## 2018-01-16 NOTE — RESULT NOTES
Verified Results  (1) PT WITH INR [id number] 11:25AM Rajiv Leon    Order Number: [id number]     Test Name Result Flag Reference   INR 2 24 H 0 86-1 16   PT 24 5 seconds H 12 0-14 3       Plan  Closed Compression Fracture Of L4 Vertebral Body    · Oxycodone-Acetaminophen 5-325 MG Oral Tablet; TAKE 1 TABLET EVERY 12  HOURS AS NEEDED FOR PAIN

## 2018-01-17 NOTE — RESULT NOTES
Verified Results  (1) PT WITH INR [id number] 11:23AM Sterling Mitchell    Order Number: [id number]     Test Name Result Flag Reference   INR 1 90 H 0 86-1 16   PT 21 6 seconds H 12 0-14 3

## 2018-01-17 NOTE — RESULT NOTES
Verified Results  (1) BASIC METABOLIC PROFILE [id number] 11:27AM Saravanan Aburto Order Number: [id number]     Test Name Result Flag Reference   GLUCOSE,RANDM 100 mg/dL     If the patient is fasting, the ADA then defines impaired fasting glucose as > 100 mg/dL and diabetes as > or equal to 123 mg/dL  SODIUM 138 mmol/L  136-145   POTASSIUM 3 7 mmol/L  3 5-5 3   CHLORIDE 107 mmol/L  100-108   CARBON DIOXIDE 23 mmol/L  21-32   ANION GAP (CALC) 8 mmol/L  4-13   BLOOD UREA NITROGEN 14 mg/dL  5-25   CREATININE 0 83 mg/dL  0 60-1 30   Standardized to IDMS reference method   CALCIUM 9 0 mg/dL  8 3-10 1   eGFR Non-African American      >60 0 ml/min/1 73sq [facility] Disease Education Program recommendations are as follows:  GFR calculation is accurate only with a steady state creatinine  Chronic Kidney disease less than 60 ml/min/1 73 sq  meters  Kidney failure less than 15 ml/min/1 73 sq  meters       (1) PT WITH INR [id number] 11:27AM Shannen Trammell    Order Number: [id number]     Test Name Result Flag Reference   INR 1 78 H 0 86-1 16   PT 20 6 seconds H 12 0-14 3

## 2018-01-18 ENCOUNTER — APPOINTMENT (OUTPATIENT)
Dept: WOUND CARE | Facility: HOSPITAL | Age: 83
End: 2018-01-18
Payer: MEDICARE

## 2018-01-18 PROCEDURE — 11042 DBRDMT SUBQ TIS 1ST 20SQCM/<: CPT

## 2018-01-23 NOTE — RESULT NOTES
Verified Results  (1) PT WITH INR [id number] 11:45AM Charla Gómez     Test Name Result Flag Reference   INR 3 14 H 0 86-1 16   PT 32 7 seconds H 12 1-14 4

## 2018-01-23 NOTE — RESULT NOTES
Verified Results  (1) PT WITH INR [id number] 11:23AM Khang Hoskins     Test Name Result Flag Reference   INR 2 32 H 0 86-1 16   PT 25 7 seconds H 12 1-14 4

## 2018-01-25 ENCOUNTER — APPOINTMENT (OUTPATIENT)
Dept: WOUND CARE | Facility: HOSPITAL | Age: 83
End: 2018-01-25
Payer: MEDICARE

## 2018-01-25 PROCEDURE — 97597 DBRDMT OPN WND 1ST 20 CM/<: CPT

## 2018-01-29 ENCOUNTER — TRANSCRIBE ORDERS (OUTPATIENT)
Dept: RADIOLOGY | Facility: MEDICAL CENTER | Age: 83
End: 2018-01-29

## 2018-01-29 ENCOUNTER — APPOINTMENT (OUTPATIENT)
Dept: LAB | Facility: MEDICAL CENTER | Age: 83
End: 2018-01-29
Payer: MEDICARE

## 2018-01-29 DIAGNOSIS — I48.91 ATRIAL FIBRILLATION, UNSPECIFIED TYPE (HCC): Primary | ICD-10-CM

## 2018-01-29 DIAGNOSIS — I48.91 ATRIAL FIBRILLATION, UNSPECIFIED TYPE (HCC): ICD-10-CM

## 2018-01-29 LAB
INR PPP: 1.89 (ref 0.86–1.16)
PROTHROMBIN TIME: 21.9 SECONDS (ref 12.1–14.4)

## 2018-01-29 PROCEDURE — 85610 PROTHROMBIN TIME: CPT

## 2018-01-29 PROCEDURE — 36415 COLL VENOUS BLD VENIPUNCTURE: CPT

## 2018-01-30 ENCOUNTER — ANTICOAG VISIT (OUTPATIENT)
Dept: FAMILY MEDICINE CLINIC | Facility: CLINIC | Age: 83
End: 2018-01-30

## 2018-02-01 ENCOUNTER — APPOINTMENT (OUTPATIENT)
Dept: WOUND CARE | Facility: HOSPITAL | Age: 83
End: 2018-02-01
Payer: MEDICARE

## 2018-02-01 PROCEDURE — 11042 DBRDMT SUBQ TIS 1ST 20SQCM/<: CPT

## 2018-02-08 ENCOUNTER — APPOINTMENT (OUTPATIENT)
Dept: WOUND CARE | Facility: HOSPITAL | Age: 83
End: 2018-02-08
Payer: MEDICARE

## 2018-02-08 PROCEDURE — 15002 WOUND PREP TRK/ARM/LEG: CPT

## 2018-02-14 ENCOUNTER — OFFICE VISIT (OUTPATIENT)
Dept: FAMILY MEDICINE CLINIC | Facility: CLINIC | Age: 83
End: 2018-02-14
Payer: MEDICARE

## 2018-02-14 VITALS
DIASTOLIC BLOOD PRESSURE: 60 MMHG | BODY MASS INDEX: 20.83 KG/M2 | SYSTOLIC BLOOD PRESSURE: 146 MMHG | HEIGHT: 65 IN | WEIGHT: 125 LBS

## 2018-02-14 DIAGNOSIS — I48.21 PERMANENT ATRIAL FIBRILLATION (HCC): ICD-10-CM

## 2018-02-14 DIAGNOSIS — M15.9 PRIMARY OSTEOARTHRITIS INVOLVING MULTIPLE JOINTS: Primary | ICD-10-CM

## 2018-02-14 DIAGNOSIS — I10 ESSENTIAL HYPERTENSION: ICD-10-CM

## 2018-02-14 PROBLEM — M17.0 PRIMARY OSTEOARTHRITIS OF BOTH KNEES: Status: ACTIVE | Noted: 2017-03-15

## 2018-02-14 PROCEDURE — 99213 OFFICE O/P EST LOW 20 MIN: CPT | Performed by: FAMILY MEDICINE

## 2018-02-14 RX ORDER — FUROSEMIDE 40 MG/1
TABLET ORAL DAILY
Refills: 0 | COMMUNITY
Start: 2018-01-15 | End: 2018-03-19 | Stop reason: SDUPTHER

## 2018-02-14 RX ORDER — OXYCODONE HYDROCHLORIDE AND ACETAMINOPHEN 5; 325 MG/1; MG/1
1 TABLET ORAL 2 TIMES DAILY
COMMUNITY
End: 2018-02-14 | Stop reason: SDUPTHER

## 2018-02-14 RX ORDER — OXYCODONE HYDROCHLORIDE AND ACETAMINOPHEN 5; 325 MG/1; MG/1
1 TABLET ORAL 2 TIMES DAILY
Qty: 60 TABLET | Refills: 0 | Status: SHIPPED | OUTPATIENT
Start: 2018-02-14 | End: 2018-03-16

## 2018-02-14 RX ORDER — AMLODIPINE BESYLATE 5 MG/1
1 TABLET ORAL DAILY
COMMUNITY
Start: 2011-04-05 | End: 2018-03-19

## 2018-02-14 RX ORDER — TRIAMCINOLONE ACETONIDE 1 MG/G
CREAM TOPICAL
COMMUNITY
End: 2018-05-16 | Stop reason: SDUPTHER

## 2018-02-14 RX ORDER — DILTIAZEM HYDROCHLORIDE 120 MG/1
1 CAPSULE, COATED, EXTENDED RELEASE ORAL DAILY
COMMUNITY
Start: 2011-04-05 | End: 2018-03-19 | Stop reason: SDUPTHER

## 2018-02-14 RX ORDER — ENALAPRIL MALEATE 10 MG/1
1 TABLET ORAL 2 TIMES DAILY
COMMUNITY
Start: 2011-04-05 | End: 2018-03-19 | Stop reason: SDUPTHER

## 2018-02-14 RX ORDER — POTASSIUM CHLORIDE 20 MEQ/1
1 TABLET, EXTENDED RELEASE ORAL DAILY
COMMUNITY
Start: 2011-04-05 | End: 2018-03-19 | Stop reason: SDUPTHER

## 2018-02-14 NOTE — PROGRESS NOTES
Assessment/Plan:    No problem-specific Assessment & Plan notes found for this encounter  Diagnoses and all orders for this visit:    Permanent atrial fibrillation ([address] )    Essential hypertension    Other orders  -     amLODIPine (NORVASC) 5 mg tablet; Take 1 tablet by mouth daily  -     diltiazem (CARDIZEM CD) 120 mg 24 hr capsule; Take 1 capsule by mouth daily  -     enalapril (VASOTEC) 10 mg tablet; Take 1 tablet by mouth 2 (two) times a day  -     furosemide (LASIX) 40 mg tablet; daily    -     potassium chloride (KLOR-CON M20) 20 mEq tablet; Take 1 tablet by mouth daily  -     triamcinolone (KENALOG) 0 1 % cream; Apply topically  -     magnesium chloride-calcium (MAG-SR PLUS CALCIUM)  mg; Take by mouth daily  -     oxyCODONE-acetaminophen (PERCOCET) 5-325 mg per tablet; Take 1 tablet by mouth 2 (two) times a day  -     metoprolol tartrate (LOPRESSOR) 25 mg tablet; Take 25 mg by mouth daily          Subjective:      Patient ID: Clement Pardo is a 80 y o  female  Patient here today for a follow-up of visit also needs a refill on her pain medication for her osteoarthritis doctor [place] discontinued her digitalis and decreased her metoprolol to once a day because she had a slow resting heart rate in his office but otherwise she is in good spirits and she is doing very well        The following portions of the patient's history were reviewed and updated as appropriate:   She  has a past medical history of Allergic drug reaction; Cellulitis, leg; Pancreatitis; and Stasis dermatitis  She  does not have any pertinent problems on file  She  has a past surgical history that includes Cholecystectomy and Tooth extraction  Her family history includes Coronary artery disease in her mother; Diabetes in her sister; No Known Problems in her brother, maternal grandfather, maternal grandmother, paternal grandfather, and paternal grandmother; Prostate cancer in her father    She  reports that she has never smoked  She has never used smokeless tobacco  She reports that she does not drink alcohol or use drugs  Current Outpatient Prescriptions   Medication Sig Dispense Refill    amLODIPine (NORVASC) 5 mg tablet Take 1 tablet by mouth daily      diltiazem (CARDIZEM CD) 120 mg 24 hr capsule Take 1 capsule by mouth daily      enalapril (VASOTEC) 10 mg tablet Take 1 tablet by mouth 2 (two) times a day      furosemide (LASIX) 40 mg tablet daily    0    magnesium chloride-calcium (MAG-SR PLUS CALCIUM)  mg Take by mouth daily      metoprolol tartrate (LOPRESSOR) 25 mg tablet Take 25 mg by mouth daily      oxyCODONE-acetaminophen (PERCOCET) 5-325 mg per tablet Take 1 tablet by mouth 2 (two) times a day      potassium chloride (KLOR-CON M20) 20 mEq tablet Take 1 tablet by mouth daily      triamcinolone (KENALOG) 0 1 % cream Apply topically       No current facility-administered medications for this visit  No current outpatient prescriptions on file prior to visit  No current facility-administered medications on file prior to visit  She is allergic to azithromycin; cephalexin; latex; and mupirocin       Review of Systems   Constitutional: Negative for activity change, appetite change, diaphoresis, fatigue and fever  HENT: Negative  Eyes: Negative  Respiratory: Negative for apnea, cough, chest tightness, shortness of breath and wheezing  Cardiovascular: Negative for chest pain, palpitations and leg swelling  Gastrointestinal: Negative for abdominal distention, abdominal pain, anal bleeding, constipation, diarrhea, nausea and vomiting  Endocrine: Negative for cold intolerance, heat intolerance, polydipsia, polyphagia and polyuria  Genitourinary: Negative for difficulty urinating, dysuria, flank pain, hematuria and urgency  Musculoskeletal: Negative for arthralgias, back pain, gait problem, joint swelling and myalgias  Skin: Negative for color change, rash and wound  Allergic/Immunologic: Negative for environmental allergies, food allergies and immunocompromised state  Neurological: Negative for dizziness, seizures, syncope, speech difficulty, numbness and headaches  Hematological: Negative for adenopathy  Does not bruise/bleed easily  Psychiatric/Behavioral: Negative for agitation, behavioral problems, hallucinations, sleep disturbance and suicidal ideas  Objective:    Vitals:    02/14/18 1128   BP: 146/60        Physical Exam   Constitutional: She is oriented to person, place, and time  She appears well-developed and well-nourished  No distress  HENT:   Head: Normocephalic  Right Ear: External ear normal    Left Ear: External ear normal    Nose: Nose normal    Mouth/Throat: Oropharynx is clear and moist    Eyes: Conjunctivae and EOM are normal  Pupils are equal, round, and reactive to light  Right eye exhibits no discharge  Left eye exhibits no discharge  No scleral icterus  Neck: Normal range of motion  No tracheal deviation present  No thyromegaly present  Cardiovascular: Normal rate and normal heart sounds  Exam reveals no gallop and no friction rub  No murmur heard  Controlled atrial fibrillation   Pulmonary/Chest: Effort normal and breath sounds normal  No respiratory distress  She has no wheezes  Abdominal: Soft  Bowel sounds are normal  She exhibits no mass  There is no tenderness  There is no guarding  Musculoskeletal: She exhibits no edema or deformity  Lymphadenopathy:     She has no cervical adenopathy  Neurological: She is alert and oriented to person, place, and time  No cranial nerve deficit  Skin: Skin is warm and dry  No rash noted  She is not diaphoretic  No erythema  Psychiatric: She has a normal mood and affect   Thought content normal

## 2018-02-15 ENCOUNTER — APPOINTMENT (OUTPATIENT)
Dept: WOUND CARE | Facility: HOSPITAL | Age: 83
End: 2018-02-15
Payer: MEDICARE

## 2018-02-15 PROCEDURE — 15110 EPIDRM AGRFT T/A/L 1ST 100: CPT

## 2018-02-22 ENCOUNTER — APPOINTMENT (OUTPATIENT)
Dept: WOUND CARE | Facility: HOSPITAL | Age: 83
End: 2018-02-22
Payer: MEDICARE

## 2018-02-22 PROCEDURE — 29580 STRAPPING UNNA BOOT: CPT

## 2018-02-26 ENCOUNTER — APPOINTMENT (OUTPATIENT)
Dept: LAB | Facility: MEDICAL CENTER | Age: 83
End: 2018-02-26
Payer: MEDICARE

## 2018-02-26 ENCOUNTER — TRANSCRIBE ORDERS (OUTPATIENT)
Dept: LAB | Facility: MEDICAL CENTER | Age: 83
End: 2018-02-26

## 2018-02-26 DIAGNOSIS — I48.91 ATRIAL FIBRILLATION, UNSPECIFIED TYPE (HCC): ICD-10-CM

## 2018-02-26 DIAGNOSIS — I48.91 ATRIAL FIBRILLATION, UNSPECIFIED TYPE (HCC): Primary | ICD-10-CM

## 2018-02-26 LAB
INR PPP: 1.95 (ref 0.86–1.16)
PROTHROMBIN TIME: 22.4 SECONDS (ref 12.1–14.4)

## 2018-02-26 PROCEDURE — 36415 COLL VENOUS BLD VENIPUNCTURE: CPT

## 2018-02-26 PROCEDURE — 85610 PROTHROMBIN TIME: CPT

## 2018-02-27 ENCOUNTER — ANTICOAG VISIT (OUTPATIENT)
Dept: FAMILY MEDICINE CLINIC | Facility: CLINIC | Age: 83
End: 2018-02-27

## 2018-03-01 ENCOUNTER — APPOINTMENT (OUTPATIENT)
Dept: WOUND CARE | Facility: HOSPITAL | Age: 83
End: 2018-03-01
Payer: MEDICARE

## 2018-03-01 PROCEDURE — 29580 STRAPPING UNNA BOOT: CPT

## 2018-03-08 ENCOUNTER — APPOINTMENT (OUTPATIENT)
Dept: WOUND CARE | Facility: HOSPITAL | Age: 83
End: 2018-03-08
Payer: MEDICARE

## 2018-03-08 PROCEDURE — 29580 STRAPPING UNNA BOOT: CPT

## 2018-03-08 PROCEDURE — 99213 OFFICE O/P EST LOW 20 MIN: CPT

## 2018-03-15 ENCOUNTER — APPOINTMENT (OUTPATIENT)
Dept: WOUND CARE | Facility: HOSPITAL | Age: 83
End: 2018-03-15
Payer: MEDICARE

## 2018-03-15 PROCEDURE — 29580 STRAPPING UNNA BOOT: CPT

## 2018-03-19 DIAGNOSIS — I10 HYPERTENSION, UNSPECIFIED TYPE: Primary | ICD-10-CM

## 2018-03-19 RX ORDER — POTASSIUM CHLORIDE 20 MEQ/1
20 TABLET, EXTENDED RELEASE ORAL DAILY
Qty: 30 TABLET | Refills: 3 | Status: SHIPPED | OUTPATIENT
Start: 2018-03-19 | End: 2018-07-16 | Stop reason: SDUPTHER

## 2018-03-19 RX ORDER — ENALAPRIL MALEATE 10 MG/1
10 TABLET ORAL 2 TIMES DAILY
Qty: 60 TABLET | Refills: 3 | Status: SHIPPED | OUTPATIENT
Start: 2018-03-19 | End: 2018-07-16 | Stop reason: SDUPTHER

## 2018-03-19 RX ORDER — FUROSEMIDE 40 MG/1
40 TABLET ORAL DAILY
Qty: 30 TABLET | Refills: 3 | Status: SHIPPED | OUTPATIENT
Start: 2018-03-19 | End: 2018-07-16 | Stop reason: SDUPTHER

## 2018-03-19 RX ORDER — AMLODIPINE BESYLATE 5 MG/1
5 TABLET ORAL DAILY
Qty: 30 TABLET | Refills: 3 | Status: CANCELLED | OUTPATIENT
Start: 2018-03-19

## 2018-03-19 RX ORDER — DILTIAZEM HYDROCHLORIDE 120 MG/1
120 CAPSULE, COATED, EXTENDED RELEASE ORAL DAILY
Qty: 30 CAPSULE | Refills: 3 | Status: SHIPPED | OUTPATIENT
Start: 2018-03-19 | End: 2018-07-16 | Stop reason: SDUPTHER

## 2018-03-20 ENCOUNTER — TELEPHONE (OUTPATIENT)
Dept: FAMILY MEDICINE CLINIC | Facility: CLINIC | Age: 83
End: 2018-03-20

## 2018-03-20 NOTE — TELEPHONE ENCOUNTER
Daughter called, she wanted to know why and when you discontinued her Amlodipine bc she knows nothing about it?

## 2018-03-20 NOTE — TELEPHONE ENCOUNTER
Per Verbal orders from Dr Gisselle Rodriges - Call Dr Kinney Chimera office tomorrow & ask him what he wants patient to take - both Amlodipine 5mg 1qd & Cardizem 120mg 1qd OR can she cut down to just the Cardizem?

## 2018-03-20 NOTE — TELEPHONE ENCOUNTER
I think we discontinue the amlodipine and substituted Cardizem CD on a few months back because her legs were edematous I think that is how that happened

## 2018-03-22 ENCOUNTER — APPOINTMENT (OUTPATIENT)
Dept: WOUND CARE | Facility: HOSPITAL | Age: 83
End: 2018-03-22
Payer: MEDICARE

## 2018-03-22 PROCEDURE — 29580 STRAPPING UNNA BOOT: CPT

## 2018-03-22 PROCEDURE — 99213 OFFICE O/P EST LOW 20 MIN: CPT

## 2018-03-22 NOTE — TELEPHONE ENCOUNTER
Dr Cordoba Sour office called stating  does not really want her on both meds, but Pt did not tolerate it well when one of them was removed  I gave his office the message that you wanted to D/C Amlodipine & they said that would be fine  Both offices will D/C Amlodipine & see how things go -- Pt was called with message

## 2018-03-26 ENCOUNTER — APPOINTMENT (OUTPATIENT)
Dept: LAB | Facility: MEDICAL CENTER | Age: 83
End: 2018-03-26
Payer: MEDICARE

## 2018-03-26 ENCOUNTER — TRANSCRIBE ORDERS (OUTPATIENT)
Dept: ADMINISTRATIVE | Facility: HOSPITAL | Age: 83
End: 2018-03-26

## 2018-03-26 DIAGNOSIS — M25.50 ARTHRALGIA, UNSPECIFIED JOINT: Primary | ICD-10-CM

## 2018-03-26 DIAGNOSIS — I48.91 ATRIAL FIBRILLATION, UNSPECIFIED TYPE (HCC): Primary | ICD-10-CM

## 2018-03-26 DIAGNOSIS — I48.91 ATRIAL FIBRILLATION, UNSPECIFIED TYPE (HCC): ICD-10-CM

## 2018-03-26 DIAGNOSIS — M25.50 ARTHRALGIA, UNSPECIFIED JOINT: ICD-10-CM

## 2018-03-26 LAB
INR PPP: 1.85 (ref 0.86–1.16)
PROTHROMBIN TIME: 21.5 SECONDS (ref 12.1–14.4)

## 2018-03-26 PROCEDURE — 85610 PROTHROMBIN TIME: CPT

## 2018-03-26 PROCEDURE — 36415 COLL VENOUS BLD VENIPUNCTURE: CPT

## 2018-03-26 RX ORDER — OXYCODONE HYDROCHLORIDE AND ACETAMINOPHEN 5; 325 MG/1; MG/1
1 TABLET ORAL EVERY 12 HOURS PRN
Qty: 60 TABLET | Refills: 0 | Status: SHIPPED | OUTPATIENT
Start: 2018-03-26 | End: 2018-03-26 | Stop reason: SDUPTHER

## 2018-03-26 RX ORDER — OXYCODONE HYDROCHLORIDE AND ACETAMINOPHEN 5; 325 MG/1; MG/1
1 TABLET ORAL EVERY 12 HOURS PRN
Qty: 60 TABLET | Refills: 0 | Status: SHIPPED | OUTPATIENT
Start: 2018-03-26 | End: 2018-05-02 | Stop reason: SDUPTHER

## 2018-03-27 ENCOUNTER — ANTICOAG VISIT (OUTPATIENT)
Dept: FAMILY MEDICINE CLINIC | Facility: CLINIC | Age: 83
End: 2018-03-27

## 2018-03-29 ENCOUNTER — APPOINTMENT (OUTPATIENT)
Dept: WOUND CARE | Facility: HOSPITAL | Age: 83
End: 2018-03-29
Payer: MEDICARE

## 2018-03-29 PROCEDURE — 29580 STRAPPING UNNA BOOT: CPT

## 2018-04-05 ENCOUNTER — APPOINTMENT (OUTPATIENT)
Dept: WOUND CARE | Facility: HOSPITAL | Age: 83
End: 2018-04-05
Payer: MEDICARE

## 2018-04-05 PROCEDURE — 99212 OFFICE O/P EST SF 10 MIN: CPT

## 2018-04-23 ENCOUNTER — TRANSCRIBE ORDERS (OUTPATIENT)
Dept: RADIOLOGY | Facility: MEDICAL CENTER | Age: 83
End: 2018-04-23

## 2018-04-23 ENCOUNTER — LAB (OUTPATIENT)
Dept: LAB | Facility: MEDICAL CENTER | Age: 83
End: 2018-04-23
Payer: MEDICARE

## 2018-04-23 DIAGNOSIS — I48.91 ATRIAL FIBRILLATION, UNSPECIFIED TYPE (HCC): Primary | ICD-10-CM

## 2018-04-23 DIAGNOSIS — I48.91 ATRIAL FIBRILLATION, UNSPECIFIED TYPE (HCC): ICD-10-CM

## 2018-04-23 LAB
INR PPP: 2.22 (ref 0.86–1.16)
PROTHROMBIN TIME: 24.9 SECONDS (ref 12.1–14.4)

## 2018-04-23 PROCEDURE — 85610 PROTHROMBIN TIME: CPT

## 2018-04-23 PROCEDURE — 36415 COLL VENOUS BLD VENIPUNCTURE: CPT

## 2018-04-24 ENCOUNTER — ANTICOAG VISIT (OUTPATIENT)
Dept: FAMILY MEDICINE CLINIC | Facility: CLINIC | Age: 83
End: 2018-04-24

## 2018-04-24 NOTE — PROGRESS NOTES
Please call the patient regarding her abnormal result   INR is 2 2 to she can continue on her current dose of Coumadin recheck in a about 2 months because her protime is been pretty stable

## 2018-05-02 DIAGNOSIS — M25.50 ARTHRALGIA, UNSPECIFIED JOINT: ICD-10-CM

## 2018-05-02 RX ORDER — OXYCODONE HYDROCHLORIDE AND ACETAMINOPHEN 5; 325 MG/1; MG/1
1 TABLET ORAL EVERY 12 HOURS PRN
Qty: 60 TABLET | Refills: 0 | Status: SHIPPED | OUTPATIENT
Start: 2018-05-02 | End: 2018-06-11 | Stop reason: SDUPTHER

## 2018-05-16 ENCOUNTER — OFFICE VISIT (OUTPATIENT)
Dept: FAMILY MEDICINE CLINIC | Facility: CLINIC | Age: 83
End: 2018-05-16
Payer: MEDICARE

## 2018-05-16 VITALS — SYSTOLIC BLOOD PRESSURE: 174 MMHG | DIASTOLIC BLOOD PRESSURE: 88 MMHG

## 2018-05-16 DIAGNOSIS — Z13.6 SCREENING FOR CARDIOVASCULAR CONDITION: ICD-10-CM

## 2018-05-16 DIAGNOSIS — Z00.00 MEDICARE ANNUAL WELLNESS VISIT, SUBSEQUENT: ICD-10-CM

## 2018-05-16 DIAGNOSIS — L30.9 DERMATITIS: Primary | ICD-10-CM

## 2018-05-16 PROCEDURE — G0439 PPPS, SUBSEQ VISIT: HCPCS | Performed by: FAMILY MEDICINE

## 2018-05-16 RX ORDER — WARFARIN SODIUM 3 MG/1
TABLET ORAL
Refills: 0 | COMMUNITY
Start: 2018-04-18 | End: 2018-05-21 | Stop reason: SDUPTHER

## 2018-05-16 RX ORDER — TRIAMCINOLONE ACETONIDE 1 MG/G
CREAM TOPICAL 2 TIMES DAILY
Qty: 454 G | Refills: 1 | Status: SHIPPED | OUTPATIENT
Start: 2018-05-16

## 2018-05-16 NOTE — PROGRESS NOTES
HPI:  Nichole Pringle is a 80 y o  female here for her Subsequent Wellness Visit      Patient Active Problem List   Diagnosis    Ambulatory dysfunction    Anxiety    Atrial fibrillation (HCC)    Chronic venous stasis dermatitis of both lower extremities    Closed fracture of lumbar vertebra (HCC)    Hypertension    Primary osteoarthritis of both knees     Past Medical History:   Diagnosis Date    Allergic drug reaction     Cellulitis, leg     Pancreatitis     Stasis dermatitis      Past Surgical History:   Procedure Laterality Date    CHOLECYSTECTOMY      followed by procedure to remove gallstones in a pouch outside of gallbladder    TOOTH EXTRACTION       Family History   Problem Relation Age of Onset    Coronary artery disease Mother     Prostate cancer Father     Diabetes Sister     No Known Problems Brother     No Known Problems Maternal Grandmother     No Known Problems Maternal Grandfather     No Known Problems Paternal Grandmother     No Known Problems Paternal Grandfather      History   Smoking Status    Never Smoker   Smokeless Tobacco    Never Used     History   Alcohol Use No      History   Drug Use No     BP (!) 174/88 (BP Location: Left arm, Patient Position: Sitting)       Current Outpatient Prescriptions   Medication Sig Dispense Refill    diltiazem (CARDIZEM CD) 120 mg 24 hr capsule Take 1 capsule (120 mg total) by mouth daily 30 capsule 3    enalapril (VASOTEC) 10 mg tablet Take 1 tablet (10 mg total) by mouth 2 (two) times a day 60 tablet 3    furosemide (LASIX) 40 mg tablet Take 1 tablet (40 mg total) by mouth daily 30 tablet 3    magnesium chloride-calcium (MAG-SR PLUS CALCIUM)  mg Take by mouth daily      metoprolol tartrate (LOPRESSOR) 25 mg tablet Take 25 mg by mouth daily      oxyCODONE-acetaminophen (PERCOCET) 5-325 mg per tablet Take 1 tablet by mouth every 12 (twelve) hours as needed for moderate pain or severe pain Earliest Fill Date: 5/2/18 Max Daily Amount: 2 tablets 60 tablet 0    potassium chloride (KLOR-CON M20) 20 mEq tablet Take 1 tablet (20 mEq total) by mouth daily 30 tablet 3    triamcinolone (KENALOG) 0 1 % cream Apply topically      warfarin (COUMADIN) 3 mg tablet   0     No current facility-administered medications for this visit        Allergies   Allergen Reactions    Azithromycin Rash    Cephalexin Itching and Rash     Reaction Date: 29Feb2012;     Latex Rash    Mupirocin Rash     Immunization History   Administered Date(s) Administered    Pneumococcal Polysaccharide PPV23 07/14/1930       Patient Care Team:  Ruth Dodge, DO as PCP - General    Medicare Screening Tests and Risk Assessments:  AWCAMILO Clinical     ISAR:   Previous hospitalizations?:  No       Once in a Lifetime Medicare Screening:   EKG performed?:  Yes    AAA screening performed? (if performed, please add date to Health Maintenance):  No       Medicare Screening Tests and Risk Assessment:   AAA Risk Assessment    Age over 72 (males only):  Yes    Osteoporosis Risk Assessment    :  Yes    HIV Risk Assessment    None indicated:  Yes        Drug and Alcohol Use:   Tobacco use    Cigarettes:  never smoker    Tobacco use duration    Tobacco Cessation Readiness    Alcohol use    Alcohol use:  never    Alcohol Treatment Readiness   Illicit Drug Use    Drug use:  never    Drug type:  no sedative use       Diet & Exercise:   Diet   What is your diet?:  Regular   How many servings a day of the following:   Exercise    Do you currently exercise?:  unable to exercise       Cognitive Impairment Screening:   Depression screening preformed:  Yes    Cognitive Impairment Screening    Do you have difficulty learning or retaining new information?:  No Do you have difficulty handling new tasks?:  No   Do you have difficulty with reasoning?:  No Do you have difficulty with spatial ability and orientation?:  No   Do you have difficulty with language?:  No Do you have difficulty with behavior?:  No       Functional Ability/Level of Safety:   Hearing    Hearing difficulties:  Yes    Hearing aid:  No    Hearing Impairment Assessment    Hearing status:  Hard of hearing   Do your family members ever complain that you turn on the radio or T V  too loudly?:  Yes Do you find that other people have to repeat themselves when talking to you?:  Yes   Do you have difficulty hearing while talking on the phone?:  Yes Has anyone ever told you that you are speaking too loudly when talking with them?:  Yes   Do you have trouble hearing the doorbell or phone ringing?:  Yes Do you have difficulty hearing such that you feel frustrated talking to people?:  No   Do you feel sad because you cannot hear well?:  No Do you feel inconvienced due to your hearing problem?:  No   Do you think you would be a happier person if you could hear better?:  No Would you be willing to go for a hearing aid fitting if suggested?:  No   Current Activities    Status:  no driving, limited IADL's   Help needed with the folllowing:    Using the phone:  No Transportation:  Yes   Shopping:  Yes Preparing Meals:  Yes   Doing Housework:  Yes Doing Laundry:  Yes   Managing Medications:  Yes Managing Money:  Yes   ADL    Feeding:  Independant   Oral hygiene and Facial grooming:  Minimum assistance   Bathing:  Maximum assistance   Lower Body Dressing:  Minimum assistance   Toileting:  Independant   Bed Mobility:  Independant   Fall Risk   Have you fallen in the last 12 months?:  No Are you unsteady on your feet?:  Yes    Are you taking any medications that may cause fatigue or dizziness?:  No    Do you rush to the bathroom potentially risking a fall?:  No   Injury History   Polypharmacy:  No Antidepressant Use:  No   Sedative Use:  No Antihypertensive Use:  No   Previous Fall:  No Alcohol Use:  No   Deconditioning:  Yes Visual Impairment:  Yes   Cogitive Impairment:  No Mmobility Impairment:  No   Postural Hypotension:  No Urinary Incontinence: No       Home Safety:   Are there hazards in your environment?:  No   If you fell, would you need help to get back up from the ground?:  No Do you have problems or concerns getting in/out of a bed, chair, tub, or toilet?:  No   Do you feel unsteady when walking?:  Yes Is your activity limited by pain?:  No   Do you have handrails and grab-bars in the home?:  No Are emergency numbers kept by the phone and regularly updated?:  No   Are you and/or family members aware of the dangers of smoking in bed?:  No Are firearms stored securely?:  No   Do you have working smoke alarms and fire extinguisher?:  No    Home Safety Risk Factors   Unfamilar with surroundings:  No Uneven floors:  No   Stairs or handrail saftey risk:  No Loose rugs:  No   Household clutter:  No Poor household lighting:  No   No grab bars in bathroom:  Yes Further evaluation needed:  No       Advanced Directives:   Advanced Directives    Living Will:  Yes    Advanced directive:  Yes    Patient's End of Life Decisions        Urinary Incontinence:   Do you have urinary incontinence?:  No Do you have incomplete emptying?:  No   Do you urinate frequently?:  No Do you have urinary urgency?:  No   Do you have urinary hesitancy?:  No    Do you have nocturia (waking up to urinate)?:  Yes    Do you have a weak stream when urinating?:  No    Do you dribble urine after finishing?:  No    Do you have vaginal pressure?:  No        Glaucoma:            Provider Screening     Preventative Screening/Counseling:   Cardiovascular Screening/Counseling:   (Labs Q5 years, EKG optional one-time)         Diabetes Screening/Counseling:   (2 tests/year if Pre-Diabetes or 1 test/year if no Diabetes)         Colorectal Cancer Screening/Counseling:   (FOBT Q1 yr; Flex Sig Q4 yrs or Q10 yrs after Screening Colonoscopy; Screening Colonoscpy Q2 yrs High Risk or Q10 yrs Low Risk; Barium Enema Q2 yrs High Risk or Q4 yrs Low Risk)         Prostate Cancer Screening/Counseling:   (Annual) Breast Cancer Screening/Counseling:   (Baseline Age 28 - 43; Annual Age 36+)         Cervical Cancer Screening/Counseling:   (Annual for High Risk or Childbearing Age with Abnormal Pap in Last 3 yrs; Every 2 all others)         Osteoporosis Screening/Counseling:   (Every 2 Yrs if at risk or more if medically necessary)         AAA Screening/Counseling:   (Once per Lifetime with risk factors)     Age over 72 (males only):  Yes          Glaucoma Screening/Counseling:   (Annual)         HIV Screening/Counseling:   (Voluntary; Once annually for high risk OR 3 times for Pregnancy at diagnosis of IUP; 3rd trimester; and at Labor         Hepatitis C Screening:             Immunizations: Other Preventative Couseling (Non-Medicare Wellness Visit Required):       Referrals (Non-Medicare Wellness Visit Required):       Medical Equipment/Suppliers:           No exam data present    Physical Exam :  Physical Exam   Constitutional: She is oriented to person, place, and time  She appears well-developed and well-nourished  HENT:   Head: Normocephalic and atraumatic  Right Ear: External ear normal    Left Ear: External ear normal    Nose: Nose normal    Mouth/Throat: Oropharynx is clear and moist    Eyes: Conjunctivae and EOM are normal  Pupils are equal, round, and reactive to light  Neck: Normal range of motion  Neck supple  Cardiovascular: Normal rate, regular rhythm, normal heart sounds and intact distal pulses  Exam reveals no friction rub  No murmur heard  Pulmonary/Chest: Effort normal and breath sounds normal  No respiratory distress  She has no wheezes  She has no rales  She exhibits no tenderness  Abdominal: Soft  Bowel sounds are normal    Musculoskeletal: Normal range of motion  Neurological: She is alert and oriented to person, place, and time  Skin: Skin is warm and dry  Capillary refill takes 2 to 3 seconds  Rash noted  There is erythema     The patient is irritation of her pretibial region of both legs is being treated very well with a combination of triamcinolone cream on or wound a boot applications when necessary   Psychiatric: She has a normal mood and affect  Her behavior is normal  Judgment and thought content normal        Reviewed Updated [facility] Prior Wellness Visits:   Last Medicare wellness visit information was reviewed, patient interviewed , no change since last AWVyes  Last Medicare wellness visit information was reviewed, patient interviewed and updates made to the record today yes    Assessment and Plan:  No diagnosis found      Health Maintenance Due   Topic Date Due    SLP PLAN OF CARE  07/14/1930

## 2018-05-16 NOTE — PROGRESS NOTES
HPI:  Theresa Santoyo is a 80 y o  female here for her Subsequent Wellness Visit      Patient Active Problem List   Diagnosis    Ambulatory dysfunction    Anxiety    Atrial fibrillation (HCC)    Chronic venous stasis dermatitis of both lower extremities    Closed fracture of lumbar vertebra (HCC)    Hypertension    Primary osteoarthritis of both knees     Past Medical History:   Diagnosis Date    Allergic drug reaction     Cellulitis, leg     Pancreatitis     Stasis dermatitis      Past Surgical History:   Procedure Laterality Date    CHOLECYSTECTOMY      followed by procedure to remove gallstones in a pouch outside of gallbladder    TOOTH EXTRACTION       Family History   Problem Relation Age of Onset    Coronary artery disease Mother     Prostate cancer Father     Diabetes Sister     No Known Problems Brother     No Known Problems Maternal Grandmother     No Known Problems Maternal Grandfather     No Known Problems Paternal Grandmother     No Known Problems Paternal Grandfather      History   Smoking Status    Never Smoker   Smokeless Tobacco    Never Used     History   Alcohol Use No      History   Drug Use No     BP (!) 174/88 (BP Location: Left arm, Patient Position: Sitting)       Current Outpatient Prescriptions   Medication Sig Dispense Refill    diltiazem (CARDIZEM CD) 120 mg 24 hr capsule Take 1 capsule (120 mg total) by mouth daily 30 capsule 3    enalapril (VASOTEC) 10 mg tablet Take 1 tablet (10 mg total) by mouth 2 (two) times a day 60 tablet 3    furosemide (LASIX) 40 mg tablet Take 1 tablet (40 mg total) by mouth daily 30 tablet 3    magnesium chloride-calcium (MAG-SR PLUS CALCIUM)  mg Take by mouth daily      metoprolol tartrate (LOPRESSOR) 25 mg tablet Take 25 mg by mouth daily      oxyCODONE-acetaminophen (PERCOCET) 5-325 mg per tablet Take 1 tablet by mouth every 12 (twelve) hours as needed for moderate pain or severe pain Earliest Fill Date: 5/2/18 Max Daily Amount: 2 tablets 60 tablet 0    potassium chloride (KLOR-CON M20) 20 mEq tablet Take 1 tablet (20 mEq total) by mouth daily 30 tablet 3    triamcinolone (KENALOG) 0 1 % cream Apply topically 2 (two) times a day 454 g 1    warfarin (COUMADIN) 3 mg tablet   0    Wound Dressings (TENDERWRAP UNNA BOOT BANDAGE) MISC Apply 4 each topically every 3 (three) days for 30 days 6 each 5     No current facility-administered medications for this visit        Allergies   Allergen Reactions    Azithromycin Rash    Cephalexin Itching and Rash     Reaction Date: 29Feb2012;     Latex Rash    Mupirocin Rash     Immunization History   Administered Date(s) Administered    Pneumococcal Polysaccharide PPV23 07/14/1930       Patient Care Team:  Nivia Mccabe, DO as PCP - General    Medicare Screening Tests and Risk Assessments:  AWV Clinical     ISAR:   Previous hospitalizations?:  No       Once in a Lifetime Medicare Screening:   EKG performed?:  Yes    AAA screening performed? (if performed, please add date to Health Maintenance):  No       Medicare Screening Tests and Risk Assessment:   AAA Risk Assessment    Age over 72 (males only):  Yes    Osteoporosis Risk Assessment    :  Yes    HIV Risk Assessment    None indicated:  Yes        Drug and Alcohol Use:   Tobacco use    Cigarettes:  never smoker    Tobacco use duration    Tobacco Cessation Readiness    Alcohol use    Alcohol use:  never    Alcohol Treatment Readiness   Illicit Drug Use    Drug use:  never    Drug type:  no sedative use       Diet & Exercise:   Diet   What is your diet?:  Regular   How many servings a day of the following:   Exercise    Do you currently exercise?:  unable to exercise       Cognitive Impairment Screening:   Depression screening preformed:  Yes    Cognitive Impairment Screening    Do you have difficulty learning or retaining new information?:  No Do you have difficulty handling new tasks?:  No   Do you have difficulty with reasoning?:  No Do you have difficulty with spatial ability and orientation?:  No   Do you have difficulty with language?:  No Do you have difficulty with behavior?:  No       Functional Ability/Level of Safety:   Hearing    Hearing difficulties:  Yes    Hearing aid:  No    Hearing Impairment Assessment    Hearing status:  Hard of hearing   Do your family members ever complain that you turn on the radio or T V  too loudly?:  Yes Do you find that other people have to repeat themselves when talking to you?:  Yes   Do you have difficulty hearing while talking on the phone?:  Yes Has anyone ever told you that you are speaking too loudly when talking with them?:  Yes   Do you have trouble hearing the doorbell or phone ringing?:  Yes Do you have difficulty hearing such that you feel frustrated talking to people?:  No   Do you feel sad because you cannot hear well?:  No Do you feel inconvienced due to your hearing problem?:  No   Do you think you would be a happier person if you could hear better?:  No Would you be willing to go for a hearing aid fitting if suggested?:  No   Current Activities    Status:  no driving, limited IADL's   Help needed with the folllowing:    Using the phone:  No Transportation:  Yes   Shopping:  Yes Preparing Meals:  Yes   Doing Housework:  Yes Doing Laundry:  Yes   Managing Medications:  Yes Managing Money:  Yes   ADL    Feeding:  Independant   Oral hygiene and Facial grooming:  Minimum assistance   Bathing:  Maximum assistance   Lower Body Dressing:  Minimum assistance   Toileting:  Independant   Bed Mobility:  Independant   Fall Risk   Have you fallen in the last 12 months?:  No Are you unsteady on your feet?:  Yes    Are you taking any medications that may cause fatigue or dizziness?:  No    Do you rush to the bathroom potentially risking a fall?:  No   Injury History   Polypharmacy:  No Antidepressant Use:  No   Sedative Use:  No Antihypertensive Use:  No   Previous Fall:  No Alcohol Use:  No Deconditioning:  Yes Visual Impairment:  Yes   Cogitive Impairment:  No Mmobility Impairment:  No   Postural Hypotension:  No Urinary Incontinence:  No       Home Safety:   Are there hazards in your environment?:  No   If you fell, would you need help to get back up from the ground?:  No Do you have problems or concerns getting in/out of a bed, chair, tub, or toilet?:  No   Do you feel unsteady when walking?:  Yes Is your activity limited by pain?:  No   Do you have handrails and grab-bars in the home?:  No Are emergency numbers kept by the phone and regularly updated?:  No   Are you and/or family members aware of the dangers of smoking in bed?:  No Are firearms stored securely?:  No   Do you have working smoke alarms and fire extinguisher?:  No    Home Safety Risk Factors   Unfamilar with surroundings:  No Uneven floors:  No   Stairs or handrail saftey risk:  No Loose rugs:  No   Household clutter:  No Poor household lighting:  No   No grab bars in bathroom:  Yes Further evaluation needed:  No       Advanced Directives:   Advanced Directives    Living Will:  Yes    Advanced directive:  Yes    Patient's End of Life Decisions        Urinary Incontinence:   Do you have urinary incontinence?:  No Do you have incomplete emptying?:  No   Do you urinate frequently?:  No Do you have urinary urgency?:  No   Do you have urinary hesitancy?:  No    Do you have nocturia (waking up to urinate)?:  Yes    Do you have a weak stream when urinating?:  No    Do you dribble urine after finishing?:  No    Do you have vaginal pressure?:  No        Glaucoma:            Provider Screening     Preventative Screening/Counseling:   Cardiovascular Screening/Counseling:   (Labs Q5 years, EKG optional one-time)         Diabetes Screening/Counseling:   (2 tests/year if Pre-Diabetes or 1 test/year if no Diabetes)         Colorectal Cancer Screening/Counseling:   (FOBT Q1 yr; Flex Sig Q4 yrs or Q10 yrs after Screening Colonoscopy; Screening Colonoscpy Q2 yrs High Risk or Q10 yrs Low Risk; Barium Enema Q2 yrs High Risk or Q4 yrs Low Risk)         Prostate Cancer Screening/Counseling:   (Annual)          Breast Cancer Screening/Counseling:   (Baseline Age 28 - 43; Annual Age 36+)         Cervical Cancer Screening/Counseling:   (Annual for High Risk or Childbearing Age with Abnormal Pap in Last 3 yrs; Every 2 all others)         Osteoporosis Screening/Counseling:   (Every 2 Yrs if at risk or more if medically necessary)         AAA Screening/Counseling:   (Once per Lifetime with risk factors)     Age over 72 (males only):  Yes          Glaucoma Screening/Counseling:   (Annual)         HIV Screening/Counseling:   (Voluntary; Once annually for high risk OR 3 times for Pregnancy at diagnosis of IUP; 3rd trimester; and at Labor         Hepatitis C Screening:             Immunizations: Other Preventative Couseling (Non-Medicare Wellness Visit Required):       Referrals (Non-Medicare Wellness Visit Required):       Medical Equipment/Suppliers:           No exam data present    Physical Exam :  Physical Exam    Reviewed Updated [facility] Prior Wellness Visits:   Last Medicare wellness visit information was reviewed, patient interviewed , no change since last AWVyes  Last Medicare wellness visit information was reviewed, patient interviewed and updates made to the record today yes    Assessment and Plan:  1  Dermatitis  triamcinolone (KENALOG) 0 1 % cream    Wound Dressings (TENDERWRAP UNNA BOOT BANDAGE) MISC   2  Medicare annual wellness visit, subsequent     3   Screening for cardiovascular condition         Health Maintenance Due   Topic Date Due    SLP PLAN OF CARE  07/14/1930

## 2018-05-21 ENCOUNTER — LAB (OUTPATIENT)
Dept: LAB | Facility: MEDICAL CENTER | Age: 83
End: 2018-05-21
Payer: MEDICARE

## 2018-05-21 ENCOUNTER — TRANSCRIBE ORDERS (OUTPATIENT)
Dept: LAB | Facility: MEDICAL CENTER | Age: 83
End: 2018-05-21

## 2018-05-21 DIAGNOSIS — I48.21 PERMANENT ATRIAL FIBRILLATION (HCC): Primary | ICD-10-CM

## 2018-05-21 DIAGNOSIS — I48.91 ATRIAL FIBRILLATION, UNSPECIFIED TYPE (HCC): Primary | ICD-10-CM

## 2018-05-21 DIAGNOSIS — I48.91 ATRIAL FIBRILLATION, UNSPECIFIED TYPE (HCC): ICD-10-CM

## 2018-05-21 LAB
INR PPP: 2.07 (ref 0.86–1.17)
PROTHROMBIN TIME: 23.4 SECONDS (ref 11.8–14.2)

## 2018-05-21 PROCEDURE — 36415 COLL VENOUS BLD VENIPUNCTURE: CPT

## 2018-05-21 PROCEDURE — 85610 PROTHROMBIN TIME: CPT

## 2018-05-21 RX ORDER — WARFARIN SODIUM 3 MG/1
3 TABLET ORAL
Qty: 30 TABLET | Refills: 5 | Status: SHIPPED | OUTPATIENT
Start: 2018-05-21 | End: 2018-07-26 | Stop reason: HOSPADM

## 2018-05-22 ENCOUNTER — ANTICOAG VISIT (OUTPATIENT)
Dept: FAMILY MEDICINE CLINIC | Facility: CLINIC | Age: 83
End: 2018-05-22

## 2018-05-22 NOTE — PROGRESS NOTES
Call patient to notify normal results INR is good at 2 07 again speak with her daughter patient herself cannot hear well and is confused

## 2018-06-11 DIAGNOSIS — M25.50 ARTHRALGIA, UNSPECIFIED JOINT: ICD-10-CM

## 2018-06-11 RX ORDER — OXYCODONE HYDROCHLORIDE AND ACETAMINOPHEN 5; 325 MG/1; MG/1
1 TABLET ORAL EVERY 12 HOURS PRN
Qty: 60 TABLET | Refills: 0 | Status: SHIPPED | OUTPATIENT
Start: 2018-06-11 | End: 2018-07-23 | Stop reason: SDUPTHER

## 2018-06-18 ENCOUNTER — TRANSCRIBE ORDERS (OUTPATIENT)
Dept: LAB | Facility: MEDICAL CENTER | Age: 83
End: 2018-06-18

## 2018-06-18 ENCOUNTER — LAB (OUTPATIENT)
Dept: LAB | Facility: MEDICAL CENTER | Age: 83
End: 2018-06-18
Payer: MEDICARE

## 2018-06-18 DIAGNOSIS — I48.91 ATRIAL FIBRILLATION, UNSPECIFIED TYPE (HCC): Primary | ICD-10-CM

## 2018-06-18 DIAGNOSIS — I48.91 ATRIAL FIBRILLATION, UNSPECIFIED TYPE (HCC): ICD-10-CM

## 2018-06-18 LAB
INR PPP: 2.12 (ref 0.86–1.17)
PROTHROMBIN TIME: 23.8 SECONDS (ref 11.8–14.2)

## 2018-06-18 PROCEDURE — 85610 PROTHROMBIN TIME: CPT

## 2018-06-18 PROCEDURE — 36415 COLL VENOUS BLD VENIPUNCTURE: CPT

## 2018-06-20 ENCOUNTER — ANTICOAG VISIT (OUTPATIENT)
Dept: FAMILY MEDICINE CLINIC | Facility: CLINIC | Age: 83
End: 2018-06-20

## 2018-07-16 ENCOUNTER — LAB (OUTPATIENT)
Dept: LAB | Facility: MEDICAL CENTER | Age: 83
End: 2018-07-16
Payer: MEDICARE

## 2018-07-16 ENCOUNTER — TRANSCRIBE ORDERS (OUTPATIENT)
Dept: RADIOLOGY | Facility: MEDICAL CENTER | Age: 83
End: 2018-07-16

## 2018-07-16 DIAGNOSIS — I48.91 ATRIAL FIBRILLATION, UNSPECIFIED TYPE (HCC): Primary | ICD-10-CM

## 2018-07-16 DIAGNOSIS — I48.91 ATRIAL FIBRILLATION, UNSPECIFIED TYPE (HCC): ICD-10-CM

## 2018-07-16 DIAGNOSIS — I10 HYPERTENSION, UNSPECIFIED TYPE: ICD-10-CM

## 2018-07-16 LAB
INR PPP: 1.96 (ref 0.86–1.17)
PROTHROMBIN TIME: 22.4 SECONDS (ref 11.8–14.2)

## 2018-07-16 PROCEDURE — 85610 PROTHROMBIN TIME: CPT

## 2018-07-16 PROCEDURE — 36415 COLL VENOUS BLD VENIPUNCTURE: CPT

## 2018-07-16 RX ORDER — ENALAPRIL MALEATE 10 MG/1
10 TABLET ORAL 2 TIMES DAILY
Qty: 60 TABLET | Refills: 0 | Status: SHIPPED | OUTPATIENT
Start: 2018-07-16 | End: 2018-08-14 | Stop reason: SDUPTHER

## 2018-07-16 RX ORDER — POTASSIUM CHLORIDE 20 MEQ/1
20 TABLET, EXTENDED RELEASE ORAL DAILY
Qty: 30 TABLET | Refills: 0 | Status: SHIPPED | OUTPATIENT
Start: 2018-07-16 | End: 2018-08-14 | Stop reason: SDUPTHER

## 2018-07-16 RX ORDER — FUROSEMIDE 40 MG/1
40 TABLET ORAL DAILY
Qty: 30 TABLET | Refills: 0 | Status: SHIPPED | OUTPATIENT
Start: 2018-07-16 | End: 2018-08-14 | Stop reason: SDUPTHER

## 2018-07-16 RX ORDER — DILTIAZEM HYDROCHLORIDE 120 MG/1
120 CAPSULE, COATED, EXTENDED RELEASE ORAL DAILY
Qty: 30 CAPSULE | Refills: 0 | Status: SHIPPED | OUTPATIENT
Start: 2018-07-16 | End: 2018-08-14 | Stop reason: SDUPTHER

## 2018-07-17 ENCOUNTER — ANTICOAG VISIT (OUTPATIENT)
Dept: FAMILY MEDICINE CLINIC | Facility: CLINIC | Age: 83
End: 2018-07-17

## 2018-07-23 DIAGNOSIS — M25.50 ARTHRALGIA, UNSPECIFIED JOINT: ICD-10-CM

## 2018-07-23 RX ORDER — OXYCODONE HYDROCHLORIDE AND ACETAMINOPHEN 5; 325 MG/1; MG/1
1 TABLET ORAL EVERY 12 HOURS PRN
Qty: 60 TABLET | Refills: 0 | Status: SHIPPED | OUTPATIENT
Start: 2018-07-23 | End: 2018-08-28 | Stop reason: SDUPTHER

## 2018-07-24 PROBLEM — I34.0 MITRAL REGURGITATION: Status: ACTIVE | Noted: 2018-07-24

## 2018-07-24 PROBLEM — E11.9 DIABETES (HCC): Status: ACTIVE | Noted: 2018-07-24

## 2018-07-24 PROBLEM — I50.9 CONGESTIVE HEART FAILURE (CHF) (HCC): Status: ACTIVE | Noted: 2018-07-24

## 2018-07-24 PROBLEM — N28.9 RENAL INSUFFICIENCY: Status: ACTIVE | Noted: 2018-07-24

## 2018-07-24 PROBLEM — I42.9 PRIMARY CARDIOMYOPATHY (HCC): Status: ACTIVE | Noted: 2018-07-24

## 2018-07-24 RX ORDER — DIGOXIN 125 MCG
125 TABLET ORAL
COMMUNITY
End: 2018-07-26 | Stop reason: HOSPADM

## 2018-07-26 ENCOUNTER — OFFICE VISIT (OUTPATIENT)
Dept: CARDIOLOGY CLINIC | Facility: CLINIC | Age: 83
End: 2018-07-26
Payer: MEDICARE

## 2018-07-26 VITALS — SYSTOLIC BLOOD PRESSURE: 128 MMHG | HEART RATE: 56 BPM | DIASTOLIC BLOOD PRESSURE: 80 MMHG

## 2018-07-26 DIAGNOSIS — I34.0 NON-RHEUMATIC MITRAL REGURGITATION: ICD-10-CM

## 2018-07-26 DIAGNOSIS — I50.32 CHRONIC DIASTOLIC CONGESTIVE HEART FAILURE (HCC): ICD-10-CM

## 2018-07-26 DIAGNOSIS — I48.91 ATRIAL FIBRILLATION, UNSPECIFIED TYPE (HCC): ICD-10-CM

## 2018-07-26 DIAGNOSIS — R00.1 BRADYCARDIA: Primary | ICD-10-CM

## 2018-07-26 PROCEDURE — 93000 ELECTROCARDIOGRAM COMPLETE: CPT | Performed by: INTERNAL MEDICINE

## 2018-07-26 PROCEDURE — 99213 OFFICE O/P EST LOW 20 MIN: CPT | Performed by: INTERNAL MEDICINE

## 2018-07-26 RX ORDER — WARFARIN SODIUM 3 MG/1
3 TABLET ORAL
Refills: 0 | COMMUNITY
Start: 2018-07-16 | End: 2018-01-01 | Stop reason: SDUPTHER

## 2018-07-26 NOTE — PROGRESS NOTES
Subjective:        Patient ID: Kaila Hoyt is a 80 y o  female  Chief Complaint:  Kumar Kenny is here for follow-up after last visit revealed atrial fibrillation with slow ventricular response/bradycardia  She decreased her metoprolol to 25 mg once a day, we stopped her digoxin  She has had no side effects than doing these things  I also happy to hear that you stopped her Norvasc/amlodipine as this can precipitate edema, her legs do in fact look less swollen  She offers no new complaints no bradycardic symptoms, no chest pain, no palpitations, no dyspnea, I wished her happy birthday  The following portions of the patient's history were reviewed and updated as appropriate: allergies, current medications, past family history, past medical history, past social history, past surgical history and problem list   Review of Systems   Constitution: Negative for chills, diaphoresis, malaise/fatigue and weight gain  HENT: Negative for nosebleeds and stridor  Eyes: Negative for double vision, vision loss in left eye, vision loss in right eye and visual disturbance  Cardiovascular: Negative for chest pain, claudication, cyanosis, dyspnea on exertion, irregular heartbeat, leg swelling, near-syncope, orthopnea, palpitations, paroxysmal nocturnal dyspnea and syncope  Respiratory: Negative for cough, shortness of breath, snoring and wheezing  Endocrine: Negative for polydipsia, polyphagia and polyuria  Hematologic/Lymphatic: Negative for bleeding problem  Does not bruise/bleed easily  Skin: Negative for flushing and rash  Musculoskeletal: Negative for falls and myalgias  Gastrointestinal: Negative for abdominal pain, heartburn, hematemesis, hematochezia, melena and nausea  Genitourinary: Negative for hematuria  Neurological: Negative for brief paralysis, dizziness, focal weakness, headaches, light-headedness, loss of balance and vertigo     Psychiatric/Behavioral: Negative for altered mental status and substance abuse  Allergic/Immunologic: Negative for hives  Objective:     Physical Exam   Constitutional: She is oriented to person, place, and time  She appears well-developed and well-nourished  No distress  HENT:   Head: Normocephalic and atraumatic  Eyes: EOM are normal  Pupils are equal, round, and reactive to light  Neck: Normal range of motion  Neck supple  No JVD present  No thyromegaly present  Cardiovascular: Normal rate, normal heart sounds and intact distal pulses  Exam reveals no gallop and no friction rub  No murmur heard  Irregularly irregular   Pulmonary/Chest: Effort normal and breath sounds normal  No stridor  No respiratory distress  She has no wheezes  She has no rales  Abdominal: Soft  Bowel sounds are normal  She exhibits no mass  There is no tenderness  Musculoskeletal: Normal range of motion  She exhibits no edema (Legs Ace wrapped and quite thin today)  She presents in a wheelchair   Lymphadenopathy:     She has no cervical adenopathy  Neurological: She is alert and oriented to person, place, and time  Skin: Skin is warm and dry  No rash noted  She is not diaphoretic  No pallor  Psychiatric: She has a normal mood and affect  Her behavior is normal  Judgment and thought content normal    Nursing note and vitals reviewed  Lab Review:   Lab on 07/16/2018   Component Date Value    Protime 07/16/2018 22 4*    INR 07/16/2018 1 96*   Lab on 06/18/2018   Component Date Value    Protime 06/18/2018 23 8*    INR 06/18/2018 2 12*         Assessment:       1  Bradycardia     2  Atrial fibrillation, unspecified type ([address] )  POCT ECG   3  Non-rheumatic mitral regurgitation     4  Chronic diastolic congestive heart failure ([address] )          Plan:       Trisha's bradycardia is resolved, I asked her to continue with just 25 mg of metoprolol daily, to stay off digoxin  Goal INR 2 0-3 0  There has been no overt bleeding or falls      Her blood pressure is stable off of amlodipine/Norvasc, I think this was an excellent move  She will continue with diltiazem at the present dose  Would avoid addition of negative chronotropic agents without extreme caution  Her mitral insufficiency is stable via last year's echocardiogram, I hear no audible evidence for progression, I will check up on this next year after her next visit  Her fluid status is well controlled, I have advised no changes in her diuretic  I will see her back in 6 months, she or her daughter will call sooner should she have any concerning cardiac symptoms

## 2018-07-26 NOTE — PATIENT INSTRUCTIONS

## 2018-08-08 ENCOUNTER — OFFICE VISIT (OUTPATIENT)
Dept: FAMILY MEDICINE CLINIC | Facility: CLINIC | Age: 83
End: 2018-08-08
Payer: MEDICARE

## 2018-08-08 VITALS
WEIGHT: 125 LBS | HEIGHT: 65 IN | DIASTOLIC BLOOD PRESSURE: 88 MMHG | BODY MASS INDEX: 20.83 KG/M2 | SYSTOLIC BLOOD PRESSURE: 164 MMHG

## 2018-08-08 DIAGNOSIS — I10 ESSENTIAL HYPERTENSION: ICD-10-CM

## 2018-08-08 DIAGNOSIS — E03.9 ACQUIRED HYPOTHYROIDISM: Primary | ICD-10-CM

## 2018-08-08 PROCEDURE — G0439 PPPS, SUBSEQ VISIT: HCPCS | Performed by: FAMILY MEDICINE

## 2018-08-08 RX ORDER — MAGNESIUM CHLORIDE 64 MG
64 TABLET, DELAYED RELEASE (ENTERIC COATED) ORAL DAILY
COMMUNITY

## 2018-08-08 NOTE — PROGRESS NOTES
Assessment and Plan:  Problem List Items Addressed This Visit     None        Health Maintenance Due   Topic Date Due    HEMOGLOBIN A1C  07/14/1930    SLP PLAN OF CARE  07/14/1930    Diabetic Foot Exam  07/14/1940    DM Eye Exam  07/14/1940    URINE MICROALBUMIN  07/14/1940         HPI:  Daryl Powers is a 80 y o  female here for her Subsequent Wellness Visit  Patient Active Problem List   Diagnosis    Ambulatory dysfunction    Anxiety    [facility] fibrillation ([facility])    Chronic venous stasis dermatitis of both lower extremities    Closed fracture of lumbar vertebra ([facility])    Hypertension    Primary osteoarthritis of both knees    Congestive heart failure (CHF) ([facility])    Primary cardiomyopathy ([address] )    Mitral regurgitation    Renal insufficiency    Diabetes [facility]    Bradycardia     Past Medical History:   Diagnosis Date    Allergic drug reaction     Cellulitis, leg     [facility]     Dilated cardiomyopathy [facility]     Essential (primary) hypertension     Heart failure, unspecified ([facility]     History of echocardiogram 10/28/2015    EF 0 55 (55%), Normal LVSF  Mild to mod MR 2+  Left atrial enlargement   History of echocardiogram 01/03/2018    2-D w/ CFD; EF 0 55 (55%), Normal left vent systolic function  Mod mitral regurg  Left atrial enlargement      History of echocardiogram 06/14/2017    2-D w/CFD    Long term (current) use of anticoagulants     Lymphedema, not elsewhere classified     Mitral regurgitation     Pancreatitis     Rheumatic mitral insufficiency     Rheumatoid arthritis ([facility])     Stasis dermatitis      Past Surgical History:   Procedure Laterality Date    CHOLECYSTECTOMY      followed by procedure to remove gallstones in a pouch outside of gallbladder    TOOTH EXTRACTION       Family History   Problem Relation Age of Onset    Coronary artery disease Mother     Prostate cancer Father     Diabetes Sister     No Known Problems Brother     No Known Problems Maternal Grandmother     No Known Problems Maternal Grandfather     No Known Problems Paternal Grandmother     No Known Problems Paternal Grandfather      History   Smoking Status    Never Smoker   Smokeless Tobacco    Never Used     History   Alcohol Use No      History   Drug Use No         Current Outpatient Prescriptions   Medication Sig Dispense Refill    diltiazem (CARDIZEM CD) 120 mg 24 hr capsule Take 1 capsule (120 mg total) by mouth daily 30 capsule 0    enalapril (VASOTEC) 10 mg tablet Take 1 tablet (10 mg total) by mouth 2 (two) times a day 60 tablet 0    furosemide (LASIX) 40 mg tablet Take 1 tablet (40 mg total) by mouth daily 30 tablet 0    magnesium chloride (MAG64) 64 MG TBEC EC tablet Take 64 mg by mouth daily      metoprolol tartrate (LOPRESSOR) 25 mg tablet Take 1 tablet (25 mg total) by mouth daily 30 tablet 5    oxyCODONE-acetaminophen (PERCOCET) 5-325 mg per tablet Take 1 tablet by mouth every 12 (twelve) hours as needed for moderate pain or severe pain Earliest Fill Date: 7/23/18 Max Daily Amount: 2 tablets 60 tablet 0    potassium chloride (KLOR-CON M20) 20 mEq tablet Take 1 tablet (20 mEq total) by mouth daily 30 tablet 0    triamcinolone (KENALOG) 0 1 % cream Apply topically 2 (two) times a day 454 g 1    warfarin (COUMADIN) 3 mg tablet Take 3 mg by mouth Take as directed  0    Wound Dressings (TENDERWRAP UNNA BOOT BANDAGE) MISC Apply 4 each topically every 3 (three) days for 30 days 6 each 5     No current facility-administered medications for this visit        Allergies   Allergen Reactions    Azithromycin Rash    Cephalexin Itching and Rash     Reaction Date: 29Feb2012;     Latex Rash    Mupirocin Rash     Immunization History   Administered Date(s) Administered    Pneumococcal Polysaccharide PPV23 07[birth year]14/1930       Patient Care Team:  Meri Piña DO as PCP - General    Medicare Screening Tests and Risk Assessments:  JEFF Clinical     ISAR:   Previous hospitalizations?:  No       Once in a Lifetime Medicare Screening:   EKG performed?:  No    AAA screening performed? (if performed, please add date to Health Maintenance):  No       Medicare Screening Tests and Risk Assessment:   AAA Risk Assessment    None Indicated:  Yes    Osteoporosis Risk Assessment     Female:  Yes   :  Yes :  No   Age over 48:  Yes Low body weight (<127lbs):  No   Tobacco use:  No Alcohol use:  No   Low calcium diet:  No PMHX of fractures:  Yes   FHX of fractures:  No    HIV Risk Assessment    None indicated:  Yes        Drug and Alcohol Use:   Tobacco use    Cigarettes:  never smoker    Tobacco use duration    Tobacco Cessation Readiness    Alcohol use    Alcohol use:  never    Alcohol Treatment Readiness   Illicit Drug Use    Drug use:  never    Drug type:  no sedative use       Diet & Exercise:   Diet   What is your diet?:  Regular, Limited junk food   How many servings a day of the following:   Exercise    Do you currently exercise?:  unable to exercise       Cognitive Impairment Screening:   Depression screening preformed:  Yes     PHQ-9 Depression scale score:  0   Depression screening results:  negative for symptoms   Cognitive Impairment Screening    Do you have difficulty learning or retaining new information?:  No Do you have difficulty handling new tasks?:  No   Do you have difficulty with reasoning?:  No Do you have difficulty with spatial ability and orientation?:  No   Do you have difficulty with language?:  No Do you have difficulty with behavior?:  No       Functional Ability/Level of Safety:   Hearing    Hearing difficulties:  Yes Bilateral:  significantly decreased   Hearing aid:  No    Hearing Impairment Assessment    Hearing status:  Hard of hearing   Do your family members ever complain that you turn on the radio or T V  too loudly?:  Yes Do you find that other people have to repeat themselves when talking to you?:  Yes   Do you have difficulty hearing while talking on the phone?:  Yes Has anyone ever told you that you are speaking too loudly when talking with them?:  Yes   Do you have trouble hearing the doorbell or phone ringing?:  Yes Do you have difficulty hearing such that you feel frustrated talking to people?:  Yes   Do you feel sad because you cannot hear well?:  No Do you feel inconvienced due to your hearing problem?:  Yes   Do you think you would be a happier person if you could hear better?:  No Would you be willing to go for a hearing aid fitting if suggested?:  Yes   Current Activities    Status:  unlimited IADL's, no driving, no IADL's, unlimited social activities   Help needed with the folllowing:    Using the phone:  Yes Transportation:  Yes   Shopping:  Yes Preparing Meals:  Yes   Doing Housework:  Yes Doing Laundry:  Yes   Managing Medications:  Yes Managing Money:  Yes   ADL    Feeding:  Independant   Oral hygiene and Facial grooming:  Independant   Upper Body Dressing:  Maximum assistance   Lower Body Dressing:  Independant   Toileting:  Independant   Bed Mobility:  Independant   Fall Risk   Have you fallen in the last 12 months?:  No Are you unsteady on your feet?:  No    Are you taking any medications that may cause fatigue or dizziness?:  No   Do you have any chronic conditions that may contribute to a fall?:  Arthritis, Joint disease    Injury History   Polypharmacy:  No Antidepressant Use:  No   Sedative Use:  No Antihypertensive Use:  Yes   Previous Fall:  No Alcohol Use:  No   Deconditioning:  Yes Visual Impairment:  Yes   Cogitive Impairment:  No Mmobility Impairment:  Yes   Postural Hypotension:  No Urinary Incontinence:  No       Home Safety:   Are there hazards in your environment?:  No   If you fell, would you need help to get back up from the ground?:  Yes Do you have problems or concerns getting in/out of a bed, chair, tub, or toilet?:  Yes   Do you feel unsteady when walking?:  Yes Is your activity limited by pain?:  No   Do you have handrails and grab-bars in the home?:  No Are emergency numbers kept by the phone and regularly updated?:  Yes   Do you have working smoke alarms and fire extinguisher?:  Yes Do all household members know how to use them?:  Yes   Have you left the stove on unsupervised?:  No    Home Safety Risk Factors   Unfamilar with surroundings:  No Uneven floors:  No   Stairs or handrail saftey risk:  No Loose rugs:  No   Household clutter:  No Poor household lighting:  No   No grab bars in bathroom:  No Further evaluation needed:  No       Advanced Directives:   Advanced Directives    Living Will:  Yes Durable POA for healthcare: Yes   Advanced directive:  Yes    Patient's End of Life Decisions        Urinary Incontinence:   Do you have urinary incontinence?:  No Do you have incomplete emptying?:  No   Do you urinate frequently?:  No Do you have urinary urgency?:  No   Do you have urinary hesitancy?:  No Do you have dysuria (painful and/or difficult urination)?:  No   Do you have nocturia (waking up to urinate)?:  No Do you strain when urinating (have to push to urinate)?:  No   Do you have a weak stream when urinating?:  No Do you have intermittent streaming when urinating?:  No   Do you dribble urine after finishing?:  No    Do you have vaginal pressure?:  No Do you have vaginal dryness?:  No       Glaucoma:            Provider Screening    No data filed        No exam data present    Physical Exam :  General ROS: positive for  - fatigue  Physical Exam   Constitutional: She is oriented to person, place, and time  She appears well-developed and well-nourished  HENT:   Head: Normocephalic and atraumatic  Right Ear: External ear normal    Left Ear: External ear normal    Nose: Nose normal    Mouth/Throat: Oropharynx is clear and moist    Eyes: Conjunctivae and EOM are normal  Pupils are equal, round, and reactive to light  Neck: Normal range of motion  Neck supple     Cardiovascular: Normal rate, regular rhythm, normal heart sounds and intact distal pulses  Exam reveals no friction rub  No murmur heard  Pulmonary/Chest: Effort normal and breath sounds normal  No respiratory distress  She has no wheezes  She has no rales  She exhibits no tenderness  Abdominal: Soft  Bowel sounds are normal    Musculoskeletal: Normal range of motion  She exhibits tenderness and deformity  Neurological: She is alert and oriented to person, place, and time  Skin: Skin is warm and dry  Capillary refill takes 2 to 3 seconds  Psychiatric: She has a normal mood and affect   Her behavior is normal  Judgment and thought content normal

## 2018-08-14 ENCOUNTER — LAB (OUTPATIENT)
Dept: LAB | Facility: MEDICAL CENTER | Age: 83
End: 2018-08-14
Payer: MEDICARE

## 2018-08-14 ENCOUNTER — TRANSCRIBE ORDERS (OUTPATIENT)
Dept: RADIOLOGY | Facility: MEDICAL CENTER | Age: 83
End: 2018-08-14

## 2018-08-14 DIAGNOSIS — I10 HYPERTENSION, UNSPECIFIED TYPE: ICD-10-CM

## 2018-08-14 DIAGNOSIS — I48.91 ATRIAL FIBRILLATION, UNSPECIFIED TYPE (HCC): ICD-10-CM

## 2018-08-14 DIAGNOSIS — I48.91 ATRIAL FIBRILLATION, UNSPECIFIED TYPE (HCC): Primary | ICD-10-CM

## 2018-08-14 LAB
ALBUMIN SERPL BCP-MCNC: 3.3 G/DL (ref 3.5–5)
ALP SERPL-CCNC: 85 U/L (ref 46–116)
ALT SERPL W P-5'-P-CCNC: 16 U/L (ref 12–78)
ANION GAP SERPL CALCULATED.3IONS-SCNC: 8 MMOL/L (ref 4–13)
AST SERPL W P-5'-P-CCNC: 17 U/L (ref 5–45)
BILIRUB SERPL-MCNC: 0.72 MG/DL (ref 0.2–1)
BUN SERPL-MCNC: 11 MG/DL (ref 5–25)
CALCIUM SERPL-MCNC: 8.7 MG/DL (ref 8.3–10.1)
CHLORIDE SERPL-SCNC: 105 MMOL/L (ref 100–108)
CO2 SERPL-SCNC: 25 MMOL/L (ref 21–32)
CREAT SERPL-MCNC: 0.75 MG/DL (ref 0.6–1.3)
GFR SERPL CREATININE-BSD FRML MDRD: 71 ML/MIN/1.73SQ M
GLUCOSE P FAST SERPL-MCNC: 93 MG/DL (ref 65–99)
INR PPP: 1.97 (ref 0.86–1.17)
POTASSIUM SERPL-SCNC: 3.6 MMOL/L (ref 3.5–5.3)
PROT SERPL-MCNC: 7.5 G/DL (ref 6.4–8.2)
PROTHROMBIN TIME: 22.5 SECONDS (ref 11.8–14.2)
SODIUM SERPL-SCNC: 138 MMOL/L (ref 136–145)
TSH SERPL DL<=0.05 MIU/L-ACNC: 1.4 UIU/ML (ref 0.36–3.74)

## 2018-08-14 PROCEDURE — 80053 COMPREHEN METABOLIC PANEL: CPT | Performed by: FAMILY MEDICINE

## 2018-08-14 PROCEDURE — 84443 ASSAY THYROID STIM HORMONE: CPT | Performed by: FAMILY MEDICINE

## 2018-08-14 PROCEDURE — 85610 PROTHROMBIN TIME: CPT

## 2018-08-14 PROCEDURE — 36415 COLL VENOUS BLD VENIPUNCTURE: CPT | Performed by: FAMILY MEDICINE

## 2018-08-14 RX ORDER — FUROSEMIDE 40 MG/1
40 TABLET ORAL DAILY
Qty: 30 TABLET | Refills: 5 | Status: SHIPPED | OUTPATIENT
Start: 2018-08-14 | End: 2019-01-01 | Stop reason: SDUPTHER

## 2018-08-14 RX ORDER — ENALAPRIL MALEATE 10 MG/1
10 TABLET ORAL 2 TIMES DAILY
Qty: 60 TABLET | Refills: 5 | Status: SHIPPED | OUTPATIENT
Start: 2018-08-14 | End: 2019-01-01 | Stop reason: SDUPTHER

## 2018-08-14 RX ORDER — POTASSIUM CHLORIDE 20 MEQ/1
20 TABLET, EXTENDED RELEASE ORAL DAILY
Qty: 30 TABLET | Refills: 5 | Status: SHIPPED | OUTPATIENT
Start: 2018-08-14 | End: 2019-01-01 | Stop reason: SDUPTHER

## 2018-08-14 RX ORDER — DILTIAZEM HYDROCHLORIDE 120 MG/1
120 CAPSULE, COATED, EXTENDED RELEASE ORAL DAILY
Qty: 30 CAPSULE | Refills: 5 | Status: SHIPPED | OUTPATIENT
Start: 2018-08-14 | End: 2019-01-01 | Stop reason: SDUPTHER

## 2018-08-15 ENCOUNTER — ANTICOAG VISIT (OUTPATIENT)
Dept: FAMILY MEDICINE CLINIC | Facility: CLINIC | Age: 83
End: 2018-08-15

## 2018-08-15 NOTE — PROGRESS NOTES
Call patient to notify normal results chemistry panel is perfect grew absolutely normal kidney function and liver function and her sugar is 93

## 2018-08-20 DIAGNOSIS — Z51.81 ENCOUNTER FOR MONITORING COUMADIN THERAPY: Primary | ICD-10-CM

## 2018-08-20 DIAGNOSIS — Z79.01 ENCOUNTER FOR MONITORING COUMADIN THERAPY: Primary | ICD-10-CM

## 2018-08-28 DIAGNOSIS — M25.50 ARTHRALGIA, UNSPECIFIED JOINT: ICD-10-CM

## 2018-08-28 RX ORDER — OXYCODONE HYDROCHLORIDE AND ACETAMINOPHEN 5; 325 MG/1; MG/1
1 TABLET ORAL EVERY 12 HOURS PRN
Qty: 60 TABLET | Refills: 0 | Status: SHIPPED | OUTPATIENT
Start: 2018-08-28 | End: 2018-10-08 | Stop reason: SDUPTHER

## 2018-09-11 ENCOUNTER — ANTICOAG VISIT (OUTPATIENT)
Dept: FAMILY MEDICINE CLINIC | Facility: CLINIC | Age: 83
End: 2018-09-11

## 2018-09-11 ENCOUNTER — APPOINTMENT (OUTPATIENT)
Dept: LAB | Facility: MEDICAL CENTER | Age: 83
End: 2018-09-11
Payer: MEDICARE

## 2018-09-11 LAB
INR PPP: 1.96 (ref 0.86–1.17)
PROTHROMBIN TIME: 22.4 SECONDS (ref 11.8–14.2)

## 2018-09-11 PROCEDURE — 85610 PROTHROMBIN TIME: CPT

## 2018-09-11 PROCEDURE — 36415 COLL VENOUS BLD VENIPUNCTURE: CPT

## 2018-10-08 ENCOUNTER — APPOINTMENT (OUTPATIENT)
Dept: LAB | Facility: MEDICAL CENTER | Age: 83
End: 2018-10-08
Payer: MEDICARE

## 2018-10-08 DIAGNOSIS — M25.50 ARTHRALGIA, UNSPECIFIED JOINT: ICD-10-CM

## 2018-10-08 RX ORDER — OXYCODONE HYDROCHLORIDE AND ACETAMINOPHEN 5; 325 MG/1; MG/1
1 TABLET ORAL EVERY 12 HOURS PRN
Qty: 60 TABLET | Refills: 0 | Status: SHIPPED | OUTPATIENT
Start: 2018-10-08 | End: 2018-11-14 | Stop reason: SDUPTHER

## 2018-10-09 ENCOUNTER — ANTICOAG VISIT (OUTPATIENT)
Dept: FAMILY MEDICINE CLINIC | Facility: CLINIC | Age: 83
End: 2018-10-09

## 2018-11-01 ENCOUNTER — OFFICE VISIT (OUTPATIENT)
Dept: FAMILY MEDICINE CLINIC | Facility: CLINIC | Age: 83
End: 2018-11-01
Payer: MEDICARE

## 2018-11-01 VITALS
DIASTOLIC BLOOD PRESSURE: 68 MMHG | WEIGHT: 125 LBS | SYSTOLIC BLOOD PRESSURE: 130 MMHG | HEIGHT: 65 IN | BODY MASS INDEX: 20.83 KG/M2

## 2018-11-01 DIAGNOSIS — I48.21 PERMANENT ATRIAL FIBRILLATION (HCC): ICD-10-CM

## 2018-11-01 DIAGNOSIS — I87.2 CHRONIC VENOUS STASIS DERMATITIS OF BOTH LOWER EXTREMITIES: Primary | ICD-10-CM

## 2018-11-01 PROBLEM — E11.9 DIABETES (HCC): Status: RESOLVED | Noted: 2018-07-24 | Resolved: 2018-11-01

## 2018-11-01 PROCEDURE — 99213 OFFICE O/P EST LOW 20 MIN: CPT | Performed by: FAMILY MEDICINE

## 2018-11-01 NOTE — PROGRESS NOTES
Assessment/Plan:no new complaints    No problem-specific Assessment & Plan notes found for this encounter  There are no diagnoses linked to this encounter  Subjective:      Patient ID: Ruel Nicole is a 80 y o  female  Patient here for a follow-up visit feeling well legs look good they been responding very nicely to the [employer] boots        The following portions of the patient's history were reviewed and updated as appropriate:   She  has a past medical history of Allergic drug reaction; Cellulitis, leg; Chronic atrial fibrillation ([address] ); Dilated cardiomyopathy ([address] ); Essential (primary) hypertension; Heart failure, unspecified ([address] ); History of echocardiogram (10/28/2015); History of echocardiogram (01/03/2018); History of echocardiogram (06/14/2017); Long term (current) use of anticoagulants; Lymphedema, not elsewhere classified; Mitral regurgitation; Pancreatitis; Rheumatic mitral insufficiency; Rheumatoid arthritis ([address] ); and Stasis dermatitis  She   Patient Active Problem List    Diagnosis Date Noted    Bradycardia 07/26/2018    Congestive heart failure (CHF) ([address] ) 07/24/2018    Primary cardiomyopathy ([address] ) 07/24/2018    Mitral regurgitation 07/24/2018    Renal insufficiency 07/24/2018    Primary osteoarthritis of both knees 03/15/2017    Chronic venous stasis dermatitis of both lower extremities 08/18/2015    Ambulatory dysfunction 11/07/2014    Anxiety 06/03/2014    Closed fracture of lumbar vertebra ([address] ) 07/02/2013    Atrial fibrillation ([address] ) 07/02/2012    Hypertension 07/02/2012     She  has a past surgical history that includes Cholecystectomy and Tooth extraction  Her family history includes Coronary artery disease in her mother; Diabetes in her sister; No Known Problems in her brother, maternal grandfather, maternal grandmother, paternal grandfather, and paternal grandmother; Prostate cancer in her father  She  reports that she has never smoked   She has never used smokeless tobacco  She reports that she does not drink alcohol or use drugs  Current Outpatient Prescriptions   Medication Sig Dispense Refill    diltiazem (CARDIZEM CD) 120 mg 24 hr capsule Take 1 capsule (120 mg total) by mouth daily 30 capsule 5    enalapril (VASOTEC) 10 mg tablet Take 1 tablet (10 mg total) by mouth 2 (two) times a day 60 tablet 5    furosemide (LASIX) 40 mg tablet Take 1 tablet (40 mg total) by mouth daily 30 tablet 5    magnesium chloride (MAG64) 64 MG TBEC EC tablet Take 64 mg by mouth daily      metoprolol tartrate (LOPRESSOR) 25 mg tablet Take 1 tablet (25 mg total) by mouth daily 30 tablet 5    Multiple Vitamins-Minerals (PRESERVISION AREDS 2 PO) Take 1 capsule by mouth 2 (two) times a day      oxyCODONE-acetaminophen (PERCOCET) 5-325 mg per tablet Take 1 tablet by mouth every 12 (twelve) hours as needed for moderate pain or severe pain Earliest Fill Date: 10/8/18 Max Daily Amount: 2 tablets 60 tablet 0    potassium chloride (KLOR-CON M20) 20 mEq tablet Take 1 tablet (20 mEq total) by mouth daily 30 tablet 5    triamcinolone (KENALOG) 0 1 % cream Apply topically 2 (two) times a day 454 g 1    warfarin (COUMADIN) 3 mg tablet Take 3 mg by mouth Take as directed  0    Wound Dressings (TENDERWRAP UNNA BOOT BANDAGE) MISC Apply 4 each topically every 3 (three) days for 30 days 6 each 5     No current facility-administered medications for this visit        Current Outpatient Prescriptions on File Prior to Visit   Medication Sig    diltiazem (CARDIZEM CD) 120 mg 24 hr capsule Take 1 capsule (120 mg total) by mouth daily    enalapril (VASOTEC) 10 mg tablet Take 1 tablet (10 mg total) by mouth 2 (two) times a day    furosemide (LASIX) 40 mg tablet Take 1 tablet (40 mg total) by mouth daily    magnesium chloride (MAG64) 64 MG TBEC EC tablet Take 64 mg by mouth daily    metoprolol tartrate (LOPRESSOR) 25 mg tablet Take 1 tablet (25 mg total) by mouth daily    oxyCODONE-acetaminophen (PERCOCET) 5-325 mg per tablet Take 1 tablet by mouth every 12 (twelve) hours as needed for moderate pain or severe pain Earliest Fill Date: 10/8/18 Max Daily Amount: 2 tablets    potassium chloride (KLOR-CON M20) 20 mEq tablet Take 1 tablet (20 mEq total) by mouth daily    triamcinolone (KENALOG) 0 1 % cream Apply topically 2 (two) times a day    warfarin (COUMADIN) 3 mg tablet Take 3 mg by mouth Take as directed    Wound Dressings (TENDERWRAP UNNA BOOT BANDAGE) MISC Apply 4 each topically every 3 (three) days for 30 days     No current facility-administered medications on file prior to visit  She is allergic to azithromycin; cephalexin; latex; and mupirocin       Review of Systems   Constitutional: Negative for activity change, appetite change, diaphoresis, fatigue and fever  HENT: Negative  Eyes: Negative  Respiratory: Negative for apnea, cough, chest tightness, shortness of breath and wheezing  Cardiovascular: Negative for chest pain, palpitations and leg swelling  Gastrointestinal: Negative for abdominal distention, abdominal pain, anal bleeding, constipation, diarrhea, nausea and vomiting  Endocrine: Negative for cold intolerance, heat intolerance, polydipsia, polyphagia and polyuria  Genitourinary: Negative for difficulty urinating, dysuria, flank pain, hematuria and urgency  Musculoskeletal: Negative for arthralgias, back pain, gait problem, joint swelling and myalgias  Skin: Negative for color change, rash and wound  Allergic/Immunologic: Negative for environmental allergies, food allergies and immunocompromised state  Neurological: Negative for dizziness, seizures, syncope, speech difficulty, numbness and headaches  Hematological: Negative for adenopathy  Does not bruise/bleed easily  Psychiatric/Behavioral: Negative for agitation, behavioral problems, hallucinations, sleep disturbance and suicidal ideas           Objective:      /68   Ht 5' 5" (1 651 m)   Wt 56 7 kg (125 lb)   BMI 20 80 kg/m²          Physical Exam   Constitutional: She is oriented to person, place, and time  She appears well-developed and well-nourished  No distress  HENT:   Head: Normocephalic  Right Ear: External ear normal    Left Ear: External ear normal    Nose: Nose normal    Mouth/Throat: Oropharynx is clear and moist    Eyes: Pupils are equal, round, and reactive to light  Conjunctivae and EOM are normal  Right eye exhibits no discharge  Left eye exhibits no discharge  No scleral icterus  Neck: Normal range of motion  No tracheal deviation present  No thyromegaly present  Cardiovascular: Normal rate, regular rhythm and normal heart sounds  Exam reveals no gallop and no friction rub  No murmur heard  Pulmonary/Chest: Effort normal and breath sounds normal  No respiratory distress  She has no wheezes  Abdominal: Soft  Bowel sounds are normal  She exhibits no mass  There is no tenderness  There is no guarding  Musculoskeletal: She exhibits no edema or deformity  Lymphadenopathy:     She has no cervical adenopathy  Neurological: She is alert and oriented to person, place, and time  No cranial nerve deficit  Skin: Skin is warm and dry  No rash noted  She is not diaphoretic  No erythema  Psychiatric: She has a normal mood and affect   Thought content normal

## 2018-11-05 ENCOUNTER — ANTICOAG VISIT (OUTPATIENT)
Dept: FAMILY MEDICINE CLINIC | Facility: CLINIC | Age: 83
End: 2018-11-05

## 2018-11-05 ENCOUNTER — APPOINTMENT (OUTPATIENT)
Dept: LAB | Facility: MEDICAL CENTER | Age: 83
End: 2018-11-05
Payer: MEDICARE

## 2018-11-14 DIAGNOSIS — M25.50 ARTHRALGIA, UNSPECIFIED JOINT: ICD-10-CM

## 2018-11-14 RX ORDER — OXYCODONE HYDROCHLORIDE AND ACETAMINOPHEN 5; 325 MG/1; MG/1
1 TABLET ORAL EVERY 12 HOURS PRN
Qty: 60 TABLET | Refills: 0 | Status: SHIPPED | OUTPATIENT
Start: 2018-11-14 | End: 2018-01-01 | Stop reason: SDUPTHER

## 2019-01-01 ENCOUNTER — APPOINTMENT (OUTPATIENT)
Dept: LAB | Facility: MEDICAL CENTER | Age: 84
End: 2019-01-01
Payer: MEDICARE

## 2019-01-01 ENCOUNTER — APPOINTMENT (INPATIENT)
Dept: CT IMAGING | Facility: HOSPITAL | Age: 84
DRG: 871 | End: 2019-01-01
Payer: MEDICARE

## 2019-01-01 ENCOUNTER — APPOINTMENT (INPATIENT)
Dept: NON INVASIVE DIAGNOSTICS | Facility: HOSPITAL | Age: 84
DRG: 871 | End: 2019-01-01
Payer: MEDICARE

## 2019-01-01 ENCOUNTER — APPOINTMENT (INPATIENT)
Dept: RADIOLOGY | Facility: HOSPITAL | Age: 84
DRG: 871 | End: 2019-01-01
Payer: MEDICARE

## 2019-01-01 ENCOUNTER — ANTICOAG VISIT (OUTPATIENT)
Dept: FAMILY MEDICINE CLINIC | Facility: CLINIC | Age: 84
End: 2019-01-01

## 2019-01-01 ENCOUNTER — PATIENT OUTREACH (OUTPATIENT)
Dept: CASE MANAGEMENT | Facility: HOSPITAL | Age: 84
End: 2019-01-01

## 2019-01-01 ENCOUNTER — LAB (OUTPATIENT)
Dept: LAB | Facility: MEDICAL CENTER | Age: 84
DRG: 871 | End: 2019-01-01
Payer: MEDICARE

## 2019-01-01 ENCOUNTER — TELEPHONE (OUTPATIENT)
Dept: FAMILY MEDICINE CLINIC | Facility: CLINIC | Age: 84
End: 2019-01-01

## 2019-01-01 ENCOUNTER — LAB (OUTPATIENT)
Dept: LAB | Facility: MEDICAL CENTER | Age: 84
End: 2019-01-01
Payer: MEDICARE

## 2019-01-01 ENCOUNTER — HOSPITAL ENCOUNTER (INPATIENT)
Facility: HOSPITAL | Age: 84
LOS: 7 days | DRG: 640 | End: 2019-11-23
Attending: INTERNAL MEDICINE | Admitting: INTERNAL MEDICINE
Payer: MEDICARE

## 2019-01-01 ENCOUNTER — OFFICE VISIT (OUTPATIENT)
Dept: FAMILY MEDICINE CLINIC | Facility: CLINIC | Age: 84
End: 2019-01-01
Payer: MEDICARE

## 2019-01-01 ENCOUNTER — HOSPITAL ENCOUNTER (INPATIENT)
Facility: HOSPITAL | Age: 84
LOS: 17 days | Discharge: NURSING, REHAB OR LONG-TERM CARE | DRG: 291 | End: 2019-11-16
Attending: INTERNAL MEDICINE | Admitting: INTERNAL MEDICINE
Payer: MEDICARE

## 2019-01-01 ENCOUNTER — EPISODE CHANGES (OUTPATIENT)
Dept: CASE MANAGEMENT | Facility: HOSPITAL | Age: 84
End: 2019-01-01

## 2019-01-01 ENCOUNTER — HOSPITAL ENCOUNTER (INPATIENT)
Facility: HOSPITAL | Age: 84
LOS: 8 days | Discharge: NURSING, REHAB OR LONG-TERM CARE | DRG: 871 | End: 2019-10-30
Attending: EMERGENCY MEDICINE | Admitting: INTERNAL MEDICINE
Payer: MEDICARE

## 2019-01-01 ENCOUNTER — EPISODE CHANGES (OUTPATIENT)
Dept: CASE MANAGEMENT | Facility: OTHER | Age: 84
End: 2019-01-01

## 2019-01-01 ENCOUNTER — APPOINTMENT (EMERGENCY)
Dept: RADIOLOGY | Facility: HOSPITAL | Age: 84
DRG: 871 | End: 2019-01-01
Payer: MEDICARE

## 2019-01-01 ENCOUNTER — OFFICE VISIT (OUTPATIENT)
Dept: CARDIOLOGY CLINIC | Facility: CLINIC | Age: 84
End: 2019-01-01
Payer: MEDICARE

## 2019-01-01 ENCOUNTER — APPOINTMENT (EMERGENCY)
Dept: CT IMAGING | Facility: HOSPITAL | Age: 84
DRG: 871 | End: 2019-01-01
Payer: MEDICARE

## 2019-01-01 VITALS
TEMPERATURE: 97 F | SYSTOLIC BLOOD PRESSURE: 125 MMHG | BODY MASS INDEX: 18.81 KG/M2 | OXYGEN SATURATION: 93 % | RESPIRATION RATE: 24 BRPM | WEIGHT: 117.06 LBS | HEIGHT: 66 IN | HEART RATE: 147 BPM | DIASTOLIC BLOOD PRESSURE: 84 MMHG

## 2019-01-01 VITALS
BODY MASS INDEX: 22.15 KG/M2 | SYSTOLIC BLOOD PRESSURE: 180 MMHG | HEART RATE: 72 BPM | HEIGHT: 63 IN | DIASTOLIC BLOOD PRESSURE: 80 MMHG | WEIGHT: 125 LBS

## 2019-01-01 VITALS
SYSTOLIC BLOOD PRESSURE: 158 MMHG | DIASTOLIC BLOOD PRESSURE: 82 MMHG | BODY MASS INDEX: 22.15 KG/M2 | HEIGHT: 63 IN | WEIGHT: 125 LBS

## 2019-01-01 VITALS
DIASTOLIC BLOOD PRESSURE: 84 MMHG | WEIGHT: 125 LBS | HEIGHT: 63 IN | BODY MASS INDEX: 22.15 KG/M2 | SYSTOLIC BLOOD PRESSURE: 132 MMHG

## 2019-01-01 VITALS
HEIGHT: 65 IN | BODY MASS INDEX: 20.83 KG/M2 | WEIGHT: 125 LBS | DIASTOLIC BLOOD PRESSURE: 70 MMHG | SYSTOLIC BLOOD PRESSURE: 142 MMHG

## 2019-01-01 VITALS
SYSTOLIC BLOOD PRESSURE: 114 MMHG | DIASTOLIC BLOOD PRESSURE: 70 MMHG | HEIGHT: 63 IN | WEIGHT: 125 LBS | HEART RATE: 70 BPM | BODY MASS INDEX: 22.15 KG/M2

## 2019-01-01 DIAGNOSIS — I87.2 CHRONIC VENOUS STASIS DERMATITIS OF BOTH LOWER EXTREMITIES: Primary | ICD-10-CM

## 2019-01-01 DIAGNOSIS — Z00.00 MEDICARE ANNUAL WELLNESS VISIT, SUBSEQUENT: Primary | ICD-10-CM

## 2019-01-01 DIAGNOSIS — Z51.81 ENCOUNTER FOR MONITORING COUMADIN THERAPY: ICD-10-CM

## 2019-01-01 DIAGNOSIS — R06.02 SOB (SHORTNESS OF BREATH): ICD-10-CM

## 2019-01-01 DIAGNOSIS — I10 ESSENTIAL HYPERTENSION: ICD-10-CM

## 2019-01-01 DIAGNOSIS — I48.91 ATRIAL FIBRILLATION WITH RAPID VENTRICULAR RESPONSE (HCC): ICD-10-CM

## 2019-01-01 DIAGNOSIS — I48.91 ATRIAL FIBRILLATION, UNSPECIFIED TYPE (HCC): ICD-10-CM

## 2019-01-01 DIAGNOSIS — Z13.1 SCREENING FOR DIABETES MELLITUS: Primary | ICD-10-CM

## 2019-01-01 DIAGNOSIS — N39.0 URINARY TRACT INFECTION WITHOUT HEMATURIA, SITE UNSPECIFIED: ICD-10-CM

## 2019-01-01 DIAGNOSIS — M25.50 ARTHRALGIA, UNSPECIFIED JOINT: ICD-10-CM

## 2019-01-01 DIAGNOSIS — I10 HYPERTENSION, UNSPECIFIED TYPE: ICD-10-CM

## 2019-01-01 DIAGNOSIS — I48.21 PERMANENT ATRIAL FIBRILLATION (HCC): ICD-10-CM

## 2019-01-01 DIAGNOSIS — I10 ESSENTIAL HYPERTENSION: Primary | ICD-10-CM

## 2019-01-01 DIAGNOSIS — N28.9 RENAL INSUFFICIENCY: ICD-10-CM

## 2019-01-01 DIAGNOSIS — I87.2 CHRONIC VENOUS STASIS DERMATITIS OF BOTH LOWER EXTREMITIES: ICD-10-CM

## 2019-01-01 DIAGNOSIS — I27.20 PULMONARY HYPERTENSION (HCC): ICD-10-CM

## 2019-01-01 DIAGNOSIS — I48.21 PERMANENT ATRIAL FIBRILLATION (HCC): Primary | ICD-10-CM

## 2019-01-01 DIAGNOSIS — Z13.0 SCREENING FOR DEFICIENCY ANEMIA: ICD-10-CM

## 2019-01-01 DIAGNOSIS — R23.8 SKIN BREAKDOWN: ICD-10-CM

## 2019-01-01 DIAGNOSIS — Z79.01 ENCOUNTER FOR MONITORING COUMADIN THERAPY: ICD-10-CM

## 2019-01-01 DIAGNOSIS — N18.32 CHRONIC KIDNEY DISEASE (CKD) STAGE G3B/A1, MODERATELY DECREASED GLOMERULAR FILTRATION RATE (GFR) BETWEEN 30-44 ML/MIN/1.73 SQUARE METER AND ALBUMINURIA CREATININE RATIO LESS THAN 30 MG/G (HCC): ICD-10-CM

## 2019-01-01 DIAGNOSIS — K59.00 CONSTIPATION: ICD-10-CM

## 2019-01-01 DIAGNOSIS — I48.11 LONGSTANDING PERSISTENT ATRIAL FIBRILLATION (HCC): Primary | Chronic | ICD-10-CM

## 2019-01-01 DIAGNOSIS — Z79.01 ENCOUNTER FOR MONITORING COUMADIN THERAPY: Primary | ICD-10-CM

## 2019-01-01 DIAGNOSIS — R53.1 GENERALIZED WEAKNESS: Primary | ICD-10-CM

## 2019-01-01 DIAGNOSIS — I34.0 NON-RHEUMATIC MITRAL REGURGITATION: ICD-10-CM

## 2019-01-01 DIAGNOSIS — R26.2 AMBULATORY DYSFUNCTION: ICD-10-CM

## 2019-01-01 DIAGNOSIS — I34.0 NON-RHEUMATIC MITRAL REGURGITATION: Primary | ICD-10-CM

## 2019-01-01 DIAGNOSIS — Z13.29 SCREENING FOR HYPOTHYROIDISM: ICD-10-CM

## 2019-01-01 DIAGNOSIS — R52 MILD PAIN: ICD-10-CM

## 2019-01-01 DIAGNOSIS — Z51.81 ENCOUNTER FOR MONITORING COUMADIN THERAPY: Primary | ICD-10-CM

## 2019-01-01 DIAGNOSIS — I50.21 ACUTE SYSTOLIC CHF (CONGESTIVE HEART FAILURE) (HCC): ICD-10-CM

## 2019-01-01 DIAGNOSIS — N39.0 URINARY TRACT INFECTION WITHOUT HEMATURIA, SITE UNSPECIFIED: Primary | ICD-10-CM

## 2019-01-01 DIAGNOSIS — I48.91 ATRIAL FIBRILLATION WITH RAPID VENTRICULAR RESPONSE (HCC): Primary | ICD-10-CM

## 2019-01-01 LAB
ALBUMIN SERPL BCP-MCNC: 2.3 G/DL (ref 3.5–5)
ALBUMIN SERPL BCP-MCNC: 2.5 G/DL (ref 3.5–5)
ALBUMIN SERPL BCP-MCNC: 3.2 G/DL (ref 3.5–5)
ALBUMIN SERPL BCP-MCNC: 3.5 G/DL (ref 3.5–5)
ALP SERPL-CCNC: 103 U/L (ref 46–116)
ALP SERPL-CCNC: 115 U/L (ref 46–116)
ALP SERPL-CCNC: 117 U/L (ref 46–116)
ALP SERPL-CCNC: 117 U/L (ref 46–116)
ALP SERPL-CCNC: 133 U/L (ref 46–116)
ALP SERPL-CCNC: 93 U/L (ref 46–116)
ALP SERPL-CCNC: 94 U/L (ref 46–116)
ALP SERPL-CCNC: 99 U/L (ref 46–116)
ALT SERPL W P-5'-P-CCNC: 22 U/L (ref 12–78)
ALT SERPL W P-5'-P-CCNC: 33 U/L (ref 12–78)
ALT SERPL W P-5'-P-CCNC: 34 U/L (ref 12–78)
ALT SERPL W P-5'-P-CCNC: 46 U/L (ref 12–78)
ALT SERPL W P-5'-P-CCNC: 47 U/L (ref 12–78)
ALT SERPL W P-5'-P-CCNC: 48 U/L (ref 12–78)
ALT SERPL W P-5'-P-CCNC: 59 U/L (ref 12–78)
ALT SERPL W P-5'-P-CCNC: 61 U/L (ref 12–78)
AMMONIA PLAS-SCNC: <10 UMOL/L (ref 11–35)
AMORPH PHOS CRY URNS QL MICRO: ABNORMAL /HPF
AMORPH URATE CRY URNS QL MICRO: ABNORMAL /HPF
ANION GAP SERPL CALCULATED.3IONS-SCNC: 10 MMOL/L (ref 4–13)
ANION GAP SERPL CALCULATED.3IONS-SCNC: 11 MMOL/L (ref 4–13)
ANION GAP SERPL CALCULATED.3IONS-SCNC: 5 MMOL/L (ref 4–13)
ANION GAP SERPL CALCULATED.3IONS-SCNC: 6 MMOL/L (ref 4–13)
ANION GAP SERPL CALCULATED.3IONS-SCNC: 7 MMOL/L (ref 4–13)
ANION GAP SERPL CALCULATED.3IONS-SCNC: 7 MMOL/L (ref 4–13)
ANION GAP SERPL CALCULATED.3IONS-SCNC: 9 MMOL/L (ref 4–13)
ANION GAP SERPL CALCULATED.3IONS-SCNC: 9 MMOL/L (ref 4–13)
APTT PPP: 46 SECONDS (ref 23–37)
ARTERIAL PATENCY WRIST A: YES
ARTERIAL PATENCY WRIST A: YES
AST SERPL W P-5'-P-CCNC: 16 U/L (ref 5–45)
AST SERPL W P-5'-P-CCNC: 24 U/L (ref 5–45)
AST SERPL W P-5'-P-CCNC: 30 U/L (ref 5–45)
AST SERPL W P-5'-P-CCNC: 31 U/L (ref 5–45)
AST SERPL W P-5'-P-CCNC: 34 U/L (ref 5–45)
AST SERPL W P-5'-P-CCNC: 35 U/L (ref 5–45)
AST SERPL W P-5'-P-CCNC: 42 U/L (ref 5–45)
AST SERPL W P-5'-P-CCNC: 42 U/L (ref 5–45)
ATRIAL RATE: 111 BPM
ATRIAL RATE: 138 BPM
ATRIAL RATE: 144 BPM
BACTERIA BLD CULT: NORMAL
BACTERIA BLD CULT: NORMAL
BACTERIA UR CULT: NORMAL
BACTERIA UR CULT: NORMAL
BACTERIA UR QL AUTO: ABNORMAL /HPF
BACTERIA UR QL AUTO: ABNORMAL /HPF
BASE EX.OXY STD BLDV CALC-SCNC: 63.7 % (ref 60–80)
BASE EXCESS BLDA CALC-SCNC: -0.9 MMOL/L
BASE EXCESS BLDA CALC-SCNC: -2 MMOL/L
BASE EXCESS BLDV CALC-SCNC: -0.3 MMOL/L
BASOPHILS # BLD AUTO: 0.02 THOUSANDS/ΜL (ref 0–0.1)
BASOPHILS # BLD AUTO: 0.02 THOUSANDS/ΜL (ref 0–0.1)
BASOPHILS # BLD AUTO: 0.03 THOUSANDS/ΜL (ref 0–0.1)
BASOPHILS # BLD AUTO: 0.04 THOUSANDS/ΜL (ref 0–0.1)
BASOPHILS # BLD AUTO: 0.04 THOUSANDS/ΜL (ref 0–0.1)
BASOPHILS # BLD AUTO: 0.07 THOUSANDS/ΜL (ref 0–0.1)
BASOPHILS # BLD MANUAL: 0 THOUSAND/UL (ref 0–0.1)
BASOPHILS NFR BLD AUTO: 0 % (ref 0–1)
BASOPHILS NFR MAR MANUAL: 0 % (ref 0–1)
BILIRUB SERPL-MCNC: 0.4 MG/DL (ref 0.2–1)
BILIRUB SERPL-MCNC: 0.5 MG/DL (ref 0.2–1)
BILIRUB SERPL-MCNC: 0.5 MG/DL (ref 0.2–1)
BILIRUB SERPL-MCNC: 0.6 MG/DL (ref 0.2–1)
BILIRUB SERPL-MCNC: 0.65 MG/DL (ref 0.2–1)
BILIRUB SERPL-MCNC: 0.7 MG/DL (ref 0.2–1)
BILIRUB SERPL-MCNC: 1.3 MG/DL (ref 0.2–1)
BILIRUB SERPL-MCNC: 1.5 MG/DL (ref 0.2–1)
BILIRUB UR QL STRIP: NEGATIVE
BILIRUB UR QL STRIP: NEGATIVE
BUN SERPL-MCNC: 11 MG/DL (ref 5–25)
BUN SERPL-MCNC: 20 MG/DL (ref 5–25)
BUN SERPL-MCNC: 21 MG/DL (ref 5–25)
BUN SERPL-MCNC: 24 MG/DL (ref 5–25)
BUN SERPL-MCNC: 24 MG/DL (ref 5–25)
BUN SERPL-MCNC: 29 MG/DL (ref 5–25)
BUN SERPL-MCNC: 35 MG/DL (ref 5–25)
BUN SERPL-MCNC: 35 MG/DL (ref 5–25)
BUN SERPL-MCNC: 39 MG/DL (ref 5–25)
BUN SERPL-MCNC: 41 MG/DL (ref 5–25)
BUN SERPL-MCNC: 43 MG/DL (ref 5–25)
CALCIUM SERPL-MCNC: 8.3 MG/DL (ref 8.3–10.1)
CALCIUM SERPL-MCNC: 8.4 MG/DL (ref 8.3–10.1)
CALCIUM SERPL-MCNC: 8.5 MG/DL (ref 8.3–10.1)
CALCIUM SERPL-MCNC: 8.5 MG/DL (ref 8.3–10.1)
CALCIUM SERPL-MCNC: 8.6 MG/DL (ref 8.3–10.1)
CALCIUM SERPL-MCNC: 8.8 MG/DL (ref 8.3–10.1)
CALCIUM SERPL-MCNC: 8.9 MG/DL (ref 8.3–10.1)
CHLORIDE SERPL-SCNC: 103 MMOL/L (ref 100–108)
CHLORIDE SERPL-SCNC: 104 MMOL/L (ref 100–108)
CHLORIDE SERPL-SCNC: 106 MMOL/L (ref 100–108)
CHLORIDE SERPL-SCNC: 107 MMOL/L (ref 100–108)
CHLORIDE SERPL-SCNC: 108 MMOL/L (ref 100–108)
CHLORIDE SERPL-SCNC: 110 MMOL/L (ref 100–108)
CHLORIDE SERPL-SCNC: 110 MMOL/L (ref 100–108)
CHLORIDE SERPL-SCNC: 111 MMOL/L (ref 100–108)
CHLORIDE SERPL-SCNC: 113 MMOL/L (ref 100–108)
CLARITY UR: ABNORMAL
CLARITY UR: CLEAR
CO2 SERPL-SCNC: 21 MMOL/L (ref 21–32)
CO2 SERPL-SCNC: 22 MMOL/L (ref 21–32)
CO2 SERPL-SCNC: 25 MMOL/L (ref 21–32)
CO2 SERPL-SCNC: 26 MMOL/L (ref 21–32)
CO2 SERPL-SCNC: 27 MMOL/L (ref 21–32)
CO2 SERPL-SCNC: 28 MMOL/L (ref 21–32)
CO2 SERPL-SCNC: 28 MMOL/L (ref 21–32)
CO2 SERPL-SCNC: 29 MMOL/L (ref 21–32)
CO2 SERPL-SCNC: 33 MMOL/L (ref 21–32)
COLOR UR: YELLOW
COLOR UR: YELLOW
CREAT SERPL-MCNC: 0.68 MG/DL (ref 0.6–1.3)
CREAT SERPL-MCNC: 0.91 MG/DL (ref 0.6–1.3)
CREAT SERPL-MCNC: 0.91 MG/DL (ref 0.6–1.3)
CREAT SERPL-MCNC: 0.97 MG/DL (ref 0.6–1.3)
CREAT SERPL-MCNC: 0.99 MG/DL (ref 0.6–1.3)
CREAT SERPL-MCNC: 1.05 MG/DL (ref 0.6–1.3)
CREAT SERPL-MCNC: 1.05 MG/DL (ref 0.6–1.3)
CREAT SERPL-MCNC: 1.08 MG/DL (ref 0.6–1.3)
CREAT SERPL-MCNC: 1.16 MG/DL (ref 0.6–1.3)
CREAT SERPL-MCNC: 1.2 MG/DL (ref 0.6–1.3)
CREAT SERPL-MCNC: 1.27 MG/DL (ref 0.6–1.3)
EOSINOPHIL # BLD AUTO: 0 THOUSAND/ΜL (ref 0–0.61)
EOSINOPHIL # BLD AUTO: 0 THOUSAND/ΜL (ref 0–0.61)
EOSINOPHIL # BLD AUTO: 0.01 THOUSAND/ΜL (ref 0–0.61)
EOSINOPHIL # BLD AUTO: 0.03 THOUSAND/ΜL (ref 0–0.61)
EOSINOPHIL # BLD AUTO: 0.06 THOUSAND/ΜL (ref 0–0.61)
EOSINOPHIL # BLD AUTO: 0.15 THOUSAND/ΜL (ref 0–0.61)
EOSINOPHIL # BLD AUTO: 0.18 THOUSAND/ΜL (ref 0–0.61)
EOSINOPHIL # BLD AUTO: 0.24 THOUSAND/ΜL (ref 0–0.61)
EOSINOPHIL # BLD AUTO: 0.26 THOUSAND/ΜL (ref 0–0.61)
EOSINOPHIL # BLD MANUAL: 0.11 THOUSAND/UL (ref 0–0.4)
EOSINOPHIL NFR BLD AUTO: 0 % (ref 0–6)
EOSINOPHIL NFR BLD AUTO: 1 % (ref 0–6)
EOSINOPHIL NFR BLD AUTO: 2 % (ref 0–6)
EOSINOPHIL NFR BLD AUTO: 2 % (ref 0–6)
EOSINOPHIL NFR BLD MANUAL: 1 % (ref 0–6)
ERYTHROCYTE [DISTWIDTH] IN BLOOD BY AUTOMATED COUNT: 13.3 % (ref 11.6–15.1)
ERYTHROCYTE [DISTWIDTH] IN BLOOD BY AUTOMATED COUNT: 13.3 % (ref 11.6–15.1)
ERYTHROCYTE [DISTWIDTH] IN BLOOD BY AUTOMATED COUNT: 13.5 % (ref 11.6–15.1)
ERYTHROCYTE [DISTWIDTH] IN BLOOD BY AUTOMATED COUNT: 13.5 % (ref 11.6–15.1)
ERYTHROCYTE [DISTWIDTH] IN BLOOD BY AUTOMATED COUNT: 13.6 % (ref 11.6–15.1)
ERYTHROCYTE [DISTWIDTH] IN BLOOD BY AUTOMATED COUNT: 13.6 % (ref 11.6–15.1)
ERYTHROCYTE [DISTWIDTH] IN BLOOD BY AUTOMATED COUNT: 13.7 % (ref 11.6–15.1)
ERYTHROCYTE [DISTWIDTH] IN BLOOD BY AUTOMATED COUNT: 13.8 % (ref 11.6–15.1)
FLUAV AG SPEC QL: NOT DETECTED
FLUBV AG SPEC QL: NOT DETECTED
FOLATE SERPL-MCNC: 17 NG/ML (ref 3.1–17.5)
GFR SERPL CREATININE-BSD FRML MDRD: 38 ML/MIN/1.73SQ M
GFR SERPL CREATININE-BSD FRML MDRD: 40 ML/MIN/1.73SQ M
GFR SERPL CREATININE-BSD FRML MDRD: 42 ML/MIN/1.73SQ M
GFR SERPL CREATININE-BSD FRML MDRD: 46 ML/MIN/1.73SQ M
GFR SERPL CREATININE-BSD FRML MDRD: 47 ML/MIN/1.73SQ M
GFR SERPL CREATININE-BSD FRML MDRD: 47 ML/MIN/1.73SQ M
GFR SERPL CREATININE-BSD FRML MDRD: 51 ML/MIN/1.73SQ M
GFR SERPL CREATININE-BSD FRML MDRD: 52 ML/MIN/1.73SQ M
GFR SERPL CREATININE-BSD FRML MDRD: 56 ML/MIN/1.73SQ M
GFR SERPL CREATININE-BSD FRML MDRD: 56 ML/MIN/1.73SQ M
GFR SERPL CREATININE-BSD FRML MDRD: 78 ML/MIN/1.73SQ M
GLUCOSE P FAST SERPL-MCNC: 97 MG/DL (ref 65–99)
GLUCOSE SERPL-MCNC: 116 MG/DL (ref 65–140)
GLUCOSE SERPL-MCNC: 117 MG/DL (ref 65–140)
GLUCOSE SERPL-MCNC: 118 MG/DL (ref 65–140)
GLUCOSE SERPL-MCNC: 118 MG/DL (ref 65–140)
GLUCOSE SERPL-MCNC: 122 MG/DL (ref 65–140)
GLUCOSE SERPL-MCNC: 124 MG/DL (ref 65–140)
GLUCOSE SERPL-MCNC: 86 MG/DL (ref 65–140)
GLUCOSE SERPL-MCNC: 90 MG/DL (ref 65–140)
GLUCOSE SERPL-MCNC: 97 MG/DL (ref 65–140)
GLUCOSE SERPL-MCNC: 98 MG/DL (ref 65–140)
GLUCOSE UR STRIP-MCNC: NEGATIVE MG/DL
GLUCOSE UR STRIP-MCNC: NEGATIVE MG/DL
HCO3 BLDA-SCNC: 20.4 MMOL/L (ref 22–28)
HCO3 BLDA-SCNC: 21 MMOL/L (ref 22–28)
HCO3 BLDV-SCNC: 23.7 MMOL/L (ref 24–30)
HCT VFR BLD AUTO: 35.8 % (ref 34.8–46.1)
HCT VFR BLD AUTO: 38.5 % (ref 34.8–46.1)
HCT VFR BLD AUTO: 39.5 % (ref 34.8–46.1)
HCT VFR BLD AUTO: 40.5 % (ref 34.8–46.1)
HCT VFR BLD AUTO: 41.2 % (ref 34.8–46.1)
HCT VFR BLD AUTO: 41.8 % (ref 34.8–46.1)
HCT VFR BLD AUTO: 41.8 % (ref 34.8–46.1)
HCT VFR BLD AUTO: 42.3 % (ref 34.8–46.1)
HCT VFR BLD AUTO: 42.8 % (ref 34.8–46.1)
HCT VFR BLD AUTO: 43 % (ref 34.8–46.1)
HCT VFR BLD AUTO: 47.3 % (ref 34.8–46.1)
HGB BLD-MCNC: 11.1 G/DL (ref 11.5–15.4)
HGB BLD-MCNC: 12 G/DL (ref 11.5–15.4)
HGB BLD-MCNC: 12.4 G/DL (ref 11.5–15.4)
HGB BLD-MCNC: 12.4 G/DL (ref 11.5–15.4)
HGB BLD-MCNC: 12.8 G/DL (ref 11.5–15.4)
HGB BLD-MCNC: 12.8 G/DL (ref 11.5–15.4)
HGB BLD-MCNC: 12.9 G/DL (ref 11.5–15.4)
HGB BLD-MCNC: 13.1 G/DL (ref 11.5–15.4)
HGB BLD-MCNC: 13.4 G/DL (ref 11.5–15.4)
HGB BLD-MCNC: 13.4 G/DL (ref 11.5–15.4)
HGB BLD-MCNC: 14.4 G/DL (ref 11.5–15.4)
HGB UR QL STRIP.AUTO: NEGATIVE
HGB UR QL STRIP.AUTO: NEGATIVE
IMM GRANULOCYTES # BLD AUTO: 0.08 THOUSAND/UL (ref 0–0.2)
IMM GRANULOCYTES # BLD AUTO: 0.09 THOUSAND/UL (ref 0–0.2)
IMM GRANULOCYTES # BLD AUTO: 0.11 THOUSAND/UL (ref 0–0.2)
IMM GRANULOCYTES # BLD AUTO: 0.14 THOUSAND/UL (ref 0–0.2)
IMM GRANULOCYTES # BLD AUTO: 0.18 THOUSAND/UL (ref 0–0.2)
IMM GRANULOCYTES # BLD AUTO: 0.19 THOUSAND/UL (ref 0–0.2)
IMM GRANULOCYTES # BLD AUTO: 0.2 THOUSAND/UL (ref 0–0.2)
IMM GRANULOCYTES # BLD AUTO: 0.21 THOUSAND/UL (ref 0–0.2)
IMM GRANULOCYTES # BLD AUTO: 0.21 THOUSAND/UL (ref 0–0.2)
IMM GRANULOCYTES NFR BLD AUTO: 1 % (ref 0–2)
IMM GRANULOCYTES NFR BLD AUTO: 2 % (ref 0–2)
IMM GRANULOCYTES NFR BLD AUTO: 2 % (ref 0–2)
INR PPP: 1.49 (ref 0.84–1.19)
INR PPP: 1.73 (ref 0.84–1.19)
INR PPP: 1.75 (ref 0.84–1.19)
INR PPP: 1.87 (ref 0.84–1.19)
INR PPP: 1.89 (ref 0.84–1.19)
INR PPP: 1.97 (ref 0.84–1.19)
INR PPP: 2 (ref 0.84–1.19)
INR PPP: 2 (ref 0.84–1.19)
INR PPP: 2.6 (ref 0.84–1.19)
INR PPP: 2.62 (ref 0.84–1.19)
INR PPP: 2.77 (ref 0.84–1.19)
INR PPP: 2.77 (ref 0.84–1.19)
INR PPP: 2.97 (ref 0.84–1.19)
INR PPP: 3.26 (ref 0.84–1.19)
INR PPP: 3.4 (ref 0.84–1.19)
INR PPP: 3.63 (ref 0.84–1.19)
KETONES UR STRIP-MCNC: NEGATIVE MG/DL
KETONES UR STRIP-MCNC: NEGATIVE MG/DL
L PNEUMO1 AG UR QL IA.RAPID: NEGATIVE
LACTATE SERPL-SCNC: 1.4 MMOL/L (ref 0.5–2)
LACTATE SERPL-SCNC: 2 MMOL/L (ref 0.5–2)
LEUKOCYTE ESTERASE UR QL STRIP: ABNORMAL
LEUKOCYTE ESTERASE UR QL STRIP: NEGATIVE
LG PLATELETS BLD QL SMEAR: PRESENT
LIPASE SERPL-CCNC: 137 U/L (ref 73–393)
LYMPHOCYTES # BLD AUTO: 0.51 THOUSANDS/ΜL (ref 0.6–4.47)
LYMPHOCYTES # BLD AUTO: 0.62 THOUSANDS/ΜL (ref 0.6–4.47)
LYMPHOCYTES # BLD AUTO: 0.63 THOUSANDS/ΜL (ref 0.6–4.47)
LYMPHOCYTES # BLD AUTO: 0.65 THOUSANDS/ΜL (ref 0.6–4.47)
LYMPHOCYTES # BLD AUTO: 0.66 THOUSAND/UL (ref 0.6–4.47)
LYMPHOCYTES # BLD AUTO: 0.67 THOUSANDS/ΜL (ref 0.6–4.47)
LYMPHOCYTES # BLD AUTO: 0.7 THOUSANDS/ΜL (ref 0.6–4.47)
LYMPHOCYTES # BLD AUTO: 0.75 THOUSANDS/ΜL (ref 0.6–4.47)
LYMPHOCYTES # BLD AUTO: 0.78 THOUSANDS/ΜL (ref 0.6–4.47)
LYMPHOCYTES # BLD AUTO: 0.81 THOUSANDS/ΜL (ref 0.6–4.47)
LYMPHOCYTES # BLD AUTO: 6 % (ref 14–44)
LYMPHOCYTES NFR BLD AUTO: 3 % (ref 14–44)
LYMPHOCYTES NFR BLD AUTO: 3 % (ref 14–44)
LYMPHOCYTES NFR BLD AUTO: 4 % (ref 14–44)
LYMPHOCYTES NFR BLD AUTO: 4 % (ref 14–44)
LYMPHOCYTES NFR BLD AUTO: 6 % (ref 14–44)
MAGNESIUM SERPL-MCNC: 1.9 MG/DL (ref 1.6–2.6)
MAGNESIUM SERPL-MCNC: 1.9 MG/DL (ref 1.6–2.6)
MAGNESIUM SERPL-MCNC: 2.2 MG/DL (ref 1.6–2.6)
MAGNESIUM SERPL-MCNC: 2.3 MG/DL (ref 1.6–2.6)
MAGNESIUM SERPL-MCNC: 2.3 MG/DL (ref 1.6–2.6)
MAGNESIUM SERPL-MCNC: 2.4 MG/DL (ref 1.6–2.6)
MAGNESIUM SERPL-MCNC: 2.4 MG/DL (ref 1.6–2.6)
MCH RBC QN AUTO: 30.1 PG (ref 26.8–34.3)
MCH RBC QN AUTO: 30.2 PG (ref 26.8–34.3)
MCH RBC QN AUTO: 30.2 PG (ref 26.8–34.3)
MCH RBC QN AUTO: 30.4 PG (ref 26.8–34.3)
MCH RBC QN AUTO: 30.5 PG (ref 26.8–34.3)
MCH RBC QN AUTO: 30.5 PG (ref 26.8–34.3)
MCH RBC QN AUTO: 30.6 PG (ref 26.8–34.3)
MCH RBC QN AUTO: 30.7 PG (ref 26.8–34.3)
MCH RBC QN AUTO: 31 PG (ref 26.8–34.3)
MCHC RBC AUTO-ENTMCNC: 30.1 G/DL (ref 31.4–37.4)
MCHC RBC AUTO-ENTMCNC: 30.3 G/DL (ref 31.4–37.4)
MCHC RBC AUTO-ENTMCNC: 30.4 G/DL (ref 31.4–37.4)
MCHC RBC AUTO-ENTMCNC: 30.9 G/DL (ref 31.4–37.4)
MCHC RBC AUTO-ENTMCNC: 31 G/DL (ref 31.4–37.4)
MCHC RBC AUTO-ENTMCNC: 31.2 G/DL (ref 31.4–37.4)
MCHC RBC AUTO-ENTMCNC: 31.2 G/DL (ref 31.4–37.4)
MCHC RBC AUTO-ENTMCNC: 31.3 G/DL (ref 31.4–37.4)
MCHC RBC AUTO-ENTMCNC: 31.3 G/DL (ref 31.4–37.4)
MCHC RBC AUTO-ENTMCNC: 31.4 G/DL (ref 31.4–37.4)
MCHC RBC AUTO-ENTMCNC: 31.6 G/DL (ref 31.4–37.4)
MCV RBC AUTO: 100 FL (ref 82–98)
MCV RBC AUTO: 101 FL (ref 82–98)
MCV RBC AUTO: 97 FL (ref 82–98)
MCV RBC AUTO: 98 FL (ref 82–98)
MCV RBC AUTO: 99 FL (ref 82–98)
MONOCYTES # BLD AUTO: 1.12 THOUSAND/ΜL (ref 0.17–1.22)
MONOCYTES # BLD AUTO: 1.19 THOUSAND/ΜL (ref 0.17–1.22)
MONOCYTES # BLD AUTO: 1.23 THOUSAND/ΜL (ref 0.17–1.22)
MONOCYTES # BLD AUTO: 1.23 THOUSAND/ΜL (ref 0.17–1.22)
MONOCYTES # BLD AUTO: 1.28 THOUSAND/ΜL (ref 0.17–1.22)
MONOCYTES # BLD AUTO: 1.3 THOUSAND/ΜL (ref 0.17–1.22)
MONOCYTES # BLD AUTO: 1.32 THOUSAND/UL (ref 0–1.22)
MONOCYTES # BLD AUTO: 1.34 THOUSAND/ΜL (ref 0.17–1.22)
MONOCYTES # BLD AUTO: 1.52 THOUSAND/ΜL (ref 0.17–1.22)
MONOCYTES # BLD AUTO: 1.92 THOUSAND/ΜL (ref 0.17–1.22)
MONOCYTES NFR BLD AUTO: 10 % (ref 4–12)
MONOCYTES NFR BLD AUTO: 11 % (ref 4–12)
MONOCYTES NFR BLD AUTO: 7 % (ref 4–12)
MONOCYTES NFR BLD AUTO: 7 % (ref 4–12)
MONOCYTES NFR BLD AUTO: 8 % (ref 4–12)
MONOCYTES NFR BLD AUTO: 8 % (ref 4–12)
MONOCYTES NFR BLD: 12 % (ref 4–12)
MRSA NOSE QL CULT: NORMAL
MYELOCYTES NFR BLD MANUAL: 1 % (ref 0–1)
NASAL CANNULA: 2
NEUTROPHILS # BLD AUTO: 10.96 THOUSANDS/ΜL (ref 1.85–7.62)
NEUTROPHILS # BLD AUTO: 12.96 THOUSANDS/ΜL (ref 1.85–7.62)
NEUTROPHILS # BLD AUTO: 15.99 THOUSANDS/ΜL (ref 1.85–7.62)
NEUTROPHILS # BLD AUTO: 18.54 THOUSANDS/ΜL (ref 1.85–7.62)
NEUTROPHILS # BLD AUTO: 20.64 THOUSANDS/ΜL (ref 1.85–7.62)
NEUTROPHILS # BLD AUTO: 9.1 THOUSANDS/ΜL (ref 1.85–7.62)
NEUTROPHILS # BLD AUTO: 9.14 THOUSANDS/ΜL (ref 1.85–7.62)
NEUTROPHILS # BLD AUTO: 9.29 THOUSANDS/ΜL (ref 1.85–7.62)
NEUTROPHILS # BLD AUTO: 9.96 THOUSANDS/ΜL (ref 1.85–7.62)
NEUTROPHILS # BLD MANUAL: 8.77 THOUSAND/UL (ref 1.85–7.62)
NEUTS SEG NFR BLD AUTO: 80 % (ref 43–75)
NEUTS SEG NFR BLD AUTO: 80 % (ref 43–75)
NEUTS SEG NFR BLD AUTO: 81 % (ref 43–75)
NEUTS SEG NFR BLD AUTO: 82 % (ref 43–75)
NEUTS SEG NFR BLD AUTO: 87 % (ref 43–75)
NEUTS SEG NFR BLD AUTO: 88 % (ref 43–75)
NEUTS SEG NFR BLD AUTO: 88 % (ref 43–75)
NEUTS SEG NFR BLD AUTO: 89 % (ref 43–75)
NITRITE UR QL STRIP: NEGATIVE
NITRITE UR QL STRIP: NEGATIVE
NON VENT ROOM AIR: ABNORMAL %
NON-SQ EPI CELLS URNS QL MICRO: ABNORMAL /HPF
NON-SQ EPI CELLS URNS QL MICRO: ABNORMAL /HPF
NRBC BLD AUTO-RTO: 0 /100 WBCS
NT-PROBNP SERPL-MCNC: ABNORMAL PG/ML
O2 CT BLDA-SCNC: 17.6 ML/DL (ref 16–23)
O2 CT BLDA-SCNC: 18.1 ML/DL (ref 16–23)
O2 CT BLDV-SCNC: 12.9 ML/DL
OXYHGB MFR BLDA: 93.5 % (ref 94–97)
OXYHGB MFR BLDA: 96.4 % (ref 94–97)
PCO2 BLDA: 27.5 MM HG (ref 36–44)
PCO2 BLDA: 28.5 MM HG (ref 36–44)
PCO2 BLDV: 36.9 MM HG (ref 42–50)
PH BLDA: 7.47 [PH] (ref 7.35–7.45)
PH BLDA: 7.5 [PH] (ref 7.35–7.45)
PH BLDV: 7.43 [PH] (ref 7.3–7.4)
PH UR STRIP.AUTO: 6.5 [PH]
PH UR STRIP.AUTO: 7 [PH]
PHOSPHATE SERPL-MCNC: 2.4 MG/DL (ref 2.3–4.1)
PHOSPHATE SERPL-MCNC: 2.5 MG/DL (ref 2.3–4.1)
PHOSPHATE SERPL-MCNC: 3.4 MG/DL (ref 2.3–4.1)
PHOSPHATE SERPL-MCNC: 3.8 MG/DL (ref 2.3–4.1)
PLATELET # BLD AUTO: 141 THOUSANDS/UL (ref 149–390)
PLATELET # BLD AUTO: 162 THOUSANDS/UL (ref 149–390)
PLATELET # BLD AUTO: 166 THOUSANDS/UL (ref 149–390)
PLATELET # BLD AUTO: 170 THOUSANDS/UL (ref 149–390)
PLATELET # BLD AUTO: 187 THOUSANDS/UL (ref 149–390)
PLATELET # BLD AUTO: 192 THOUSANDS/UL (ref 149–390)
PLATELET # BLD AUTO: 194 THOUSANDS/UL (ref 149–390)
PLATELET # BLD AUTO: 212 THOUSANDS/UL (ref 149–390)
PLATELET # BLD AUTO: 234 THOUSANDS/UL (ref 149–390)
PLATELET # BLD AUTO: 265 THOUSANDS/UL (ref 149–390)
PLATELET # BLD AUTO: 280 THOUSANDS/UL (ref 149–390)
PLATELET # BLD AUTO: 286 THOUSANDS/UL (ref 149–390)
PLATELET BLD QL SMEAR: ADEQUATE
PMV BLD AUTO: 10.6 FL (ref 8.9–12.7)
PMV BLD AUTO: 11.2 FL (ref 8.9–12.7)
PMV BLD AUTO: 11.2 FL (ref 8.9–12.7)
PMV BLD AUTO: 11.3 FL (ref 8.9–12.7)
PMV BLD AUTO: 11.3 FL (ref 8.9–12.7)
PMV BLD AUTO: 11.4 FL (ref 8.9–12.7)
PMV BLD AUTO: 11.4 FL (ref 8.9–12.7)
PMV BLD AUTO: 11.5 FL (ref 8.9–12.7)
PMV BLD AUTO: 12 FL (ref 8.9–12.7)
PMV BLD AUTO: 12.5 FL (ref 8.9–12.7)
PO2 BLDA: 68.8 MM HG (ref 75–129)
PO2 BLDA: 91 MM HG (ref 75–129)
PO2 BLDV: 33.9 MM HG (ref 35–45)
POTASSIUM SERPL-SCNC: 3.1 MMOL/L (ref 3.5–5.3)
POTASSIUM SERPL-SCNC: 3.2 MMOL/L (ref 3.5–5.3)
POTASSIUM SERPL-SCNC: 3.2 MMOL/L (ref 3.5–5.3)
POTASSIUM SERPL-SCNC: 3.5 MMOL/L (ref 3.5–5.3)
POTASSIUM SERPL-SCNC: 3.6 MMOL/L (ref 3.5–5.3)
POTASSIUM SERPL-SCNC: 3.7 MMOL/L (ref 3.5–5.3)
POTASSIUM SERPL-SCNC: 3.9 MMOL/L (ref 3.5–5.3)
POTASSIUM SERPL-SCNC: 3.9 MMOL/L (ref 3.5–5.3)
POTASSIUM SERPL-SCNC: 4.1 MMOL/L (ref 3.5–5.3)
POTASSIUM SERPL-SCNC: 4.2 MMOL/L (ref 3.5–5.3)
POTASSIUM SERPL-SCNC: 4.9 MMOL/L (ref 3.5–5.3)
PROCALCITONIN SERPL-MCNC: 0.14 NG/ML
PROCALCITONIN SERPL-MCNC: 0.24 NG/ML
PROCALCITONIN SERPL-MCNC: 0.4 NG/ML
PROCALCITONIN SERPL-MCNC: 0.41 NG/ML
PROCALCITONIN SERPL-MCNC: 0.53 NG/ML
PROCALCITONIN SERPL-MCNC: 0.57 NG/ML
PROT SERPL-MCNC: 5.9 G/DL (ref 6.4–8.2)
PROT SERPL-MCNC: 6.1 G/DL (ref 6.4–8.2)
PROT SERPL-MCNC: 6.2 G/DL (ref 6.4–8.2)
PROT SERPL-MCNC: 6.2 G/DL (ref 6.4–8.2)
PROT SERPL-MCNC: 6.5 G/DL (ref 6.4–8.2)
PROT SERPL-MCNC: 6.6 G/DL (ref 6.4–8.2)
PROT SERPL-MCNC: 7.3 G/DL (ref 6.4–8.2)
PROT SERPL-MCNC: 7.4 G/DL (ref 6.4–8.2)
PROT UR STRIP-MCNC: ABNORMAL MG/DL
PROT UR STRIP-MCNC: ABNORMAL MG/DL
PROTHROMBIN TIME: 17.6 SECONDS (ref 11.6–14.5)
PROTHROMBIN TIME: 20.4 SECONDS (ref 11.6–14.5)
PROTHROMBIN TIME: 20.6 SECONDS (ref 11.6–14.5)
PROTHROMBIN TIME: 21.7 SECONDS (ref 11.6–14.5)
PROTHROMBIN TIME: 21.9 SECONDS (ref 11.6–14.5)
PROTHROMBIN TIME: 22.2 SECONDS (ref 11.6–14.5)
PROTHROMBIN TIME: 22.2 SECONDS (ref 11.6–14.5)
PROTHROMBIN TIME: 22.6 SECONDS (ref 11.6–14.5)
PROTHROMBIN TIME: 27.5 SECONDS (ref 11.6–14.5)
PROTHROMBIN TIME: 28.2 SECONDS (ref 11.6–14.5)
PROTHROMBIN TIME: 29.6 SECONDS (ref 11.6–14.5)
PROTHROMBIN TIME: 29.6 SECONDS (ref 11.6–14.5)
PROTHROMBIN TIME: 31.3 SECONDS (ref 11.6–14.5)
PROTHROMBIN TIME: 33.7 SECONDS (ref 11.6–14.5)
PROTHROMBIN TIME: 34.9 SECONDS (ref 11.6–14.5)
PROTHROMBIN TIME: 36.7 SECONDS (ref 11.6–14.5)
QRS AXIS: -12 DEGREES
QRS AXIS: -13 DEGREES
QRS AXIS: -15 DEGREES
QRSD INTERVAL: 72 MS
QRSD INTERVAL: 74 MS
QRSD INTERVAL: 82 MS
QT INTERVAL: 354 MS
QT INTERVAL: 360 MS
QT INTERVAL: 374 MS
QTC INTERVAL: 467 MS
QTC INTERVAL: 487 MS
QTC INTERVAL: 519 MS
RBC # BLD AUTO: 3.63 MILLION/UL (ref 3.81–5.12)
RBC # BLD AUTO: 3.91 MILLION/UL (ref 3.81–5.12)
RBC # BLD AUTO: 4 MILLION/UL (ref 3.81–5.12)
RBC # BLD AUTO: 4.11 MILLION/UL (ref 3.81–5.12)
RBC # BLD AUTO: 4.19 MILLION/UL (ref 3.81–5.12)
RBC # BLD AUTO: 4.21 MILLION/UL (ref 3.81–5.12)
RBC # BLD AUTO: 4.27 MILLION/UL (ref 3.81–5.12)
RBC # BLD AUTO: 4.28 MILLION/UL (ref 3.81–5.12)
RBC # BLD AUTO: 4.38 MILLION/UL (ref 3.81–5.12)
RBC # BLD AUTO: 4.39 MILLION/UL (ref 3.81–5.12)
RBC # BLD AUTO: 4.79 MILLION/UL (ref 3.81–5.12)
RBC #/AREA URNS AUTO: ABNORMAL /HPF
RBC #/AREA URNS AUTO: ABNORMAL /HPF
RSV B RNA SPEC QL NAA+PROBE: NOT DETECTED
S PNEUM AG UR QL: NEGATIVE
SL AMB POCT HEMOGLOBIN AIC: 5.4 (ref ?–6.5)
SODIUM SERPL-SCNC: 138 MMOL/L (ref 136–145)
SODIUM SERPL-SCNC: 141 MMOL/L (ref 136–145)
SODIUM SERPL-SCNC: 141 MMOL/L (ref 136–145)
SODIUM SERPL-SCNC: 142 MMOL/L (ref 136–145)
SODIUM SERPL-SCNC: 143 MMOL/L (ref 136–145)
SODIUM SERPL-SCNC: 143 MMOL/L (ref 136–145)
SODIUM SERPL-SCNC: 144 MMOL/L (ref 136–145)
SODIUM SERPL-SCNC: 146 MMOL/L (ref 136–145)
SODIUM SERPL-SCNC: 147 MMOL/L (ref 136–145)
SP GR UR STRIP.AUTO: 1.01 (ref 1–1.03)
SP GR UR STRIP.AUTO: 1.02 (ref 1–1.03)
SPECIMEN SOURCE: ABNORMAL
SPECIMEN SOURCE: ABNORMAL
T WAVE AXIS: -11 DEGREES
T WAVE AXIS: -19 DEGREES
T WAVE AXIS: -21 DEGREES
TOTAL CELLS COUNTED SPEC: 100
TROPONIN I SERPL-MCNC: 0.14 NG/ML
TROPONIN I SERPL-MCNC: 0.15 NG/ML
TROPONIN I SERPL-MCNC: 0.16 NG/ML
TROPONIN I SERPL-MCNC: 0.16 NG/ML
TSH SERPL DL<=0.05 MIU/L-ACNC: 1.89 UIU/ML (ref 0.36–3.74)
UROBILINOGEN UR QL STRIP.AUTO: 0.2 E.U./DL
UROBILINOGEN UR QL STRIP.AUTO: 0.2 E.U./DL
VENTRICULAR RATE: 105 BPM
VENTRICULAR RATE: 110 BPM
VENTRICULAR RATE: 116 BPM
VIT B12 SERPL-MCNC: 732 PG/ML (ref 100–900)
WBC # BLD AUTO: 10.96 THOUSAND/UL (ref 4.31–10.16)
WBC # BLD AUTO: 11.28 THOUSAND/UL (ref 4.31–10.16)
WBC # BLD AUTO: 11.41 THOUSAND/UL (ref 4.31–10.16)
WBC # BLD AUTO: 11.64 THOUSAND/UL (ref 4.31–10.16)
WBC # BLD AUTO: 12.45 THOUSAND/UL (ref 4.31–10.16)
WBC # BLD AUTO: 13.24 THOUSAND/UL (ref 4.31–10.16)
WBC # BLD AUTO: 15.05 THOUSAND/UL (ref 4.31–10.16)
WBC # BLD AUTO: 18.17 THOUSAND/UL (ref 4.31–10.16)
WBC # BLD AUTO: 20.8 THOUSAND/UL (ref 4.31–10.16)
WBC # BLD AUTO: 23.44 THOUSAND/UL (ref 4.31–10.16)
WBC # BLD AUTO: 6.23 THOUSAND/UL (ref 4.31–10.16)
WBC #/AREA URNS AUTO: ABNORMAL /HPF
WBC #/AREA URNS AUTO: ABNORMAL /HPF

## 2019-01-01 PROCEDURE — 97110 THERAPEUTIC EXERCISES: CPT

## 2019-01-01 PROCEDURE — 36600 WITHDRAWAL OF ARTERIAL BLOOD: CPT

## 2019-01-01 PROCEDURE — 87631 RESP VIRUS 3-5 TARGETS: CPT | Performed by: FAMILY MEDICINE

## 2019-01-01 PROCEDURE — 84484 ASSAY OF TROPONIN QUANT: CPT | Performed by: PHYSICIAN ASSISTANT

## 2019-01-01 PROCEDURE — 97116 GAIT TRAINING THERAPY: CPT

## 2019-01-01 PROCEDURE — 82140 ASSAY OF AMMONIA: CPT | Performed by: EMERGENCY MEDICINE

## 2019-01-01 PROCEDURE — 97530 THERAPEUTIC ACTIVITIES: CPT

## 2019-01-01 PROCEDURE — 85730 THROMBOPLASTIN TIME PARTIAL: CPT | Performed by: EMERGENCY MEDICINE

## 2019-01-01 PROCEDURE — 85025 COMPLETE CBC W/AUTO DIFF WBC: CPT | Performed by: INTERNAL MEDICINE

## 2019-01-01 PROCEDURE — 83735 ASSAY OF MAGNESIUM: CPT | Performed by: EMERGENCY MEDICINE

## 2019-01-01 PROCEDURE — 84100 ASSAY OF PHOSPHORUS: CPT | Performed by: FAMILY MEDICINE

## 2019-01-01 PROCEDURE — 93005 ELECTROCARDIOGRAM TRACING: CPT

## 2019-01-01 PROCEDURE — 83735 ASSAY OF MAGNESIUM: CPT | Performed by: INTERNAL MEDICINE

## 2019-01-01 PROCEDURE — 99285 EMERGENCY DEPT VISIT HI MDM: CPT | Performed by: EMERGENCY MEDICINE

## 2019-01-01 PROCEDURE — 93306 TTE W/DOPPLER COMPLETE: CPT

## 2019-01-01 PROCEDURE — 80048 BASIC METABOLIC PNL TOTAL CA: CPT | Performed by: FAMILY MEDICINE

## 2019-01-01 PROCEDURE — 85610 PROTHROMBIN TIME: CPT | Performed by: INTERNAL MEDICINE

## 2019-01-01 PROCEDURE — 87081 CULTURE SCREEN ONLY: CPT | Performed by: PHYSICIAN ASSISTANT

## 2019-01-01 PROCEDURE — 99232 SBSQ HOSP IP/OBS MODERATE 35: CPT | Performed by: INTERNAL MEDICINE

## 2019-01-01 PROCEDURE — 83036 HEMOGLOBIN GLYCOSYLATED A1C: CPT | Performed by: FAMILY MEDICINE

## 2019-01-01 PROCEDURE — 85025 COMPLETE CBC W/AUTO DIFF WBC: CPT | Performed by: FAMILY MEDICINE

## 2019-01-01 PROCEDURE — 84484 ASSAY OF TROPONIN QUANT: CPT | Performed by: EMERGENCY MEDICINE

## 2019-01-01 PROCEDURE — 93971 EXTREMITY STUDY: CPT | Performed by: SURGERY

## 2019-01-01 PROCEDURE — G8997 SWALLOW GOAL STATUS: HCPCS

## 2019-01-01 PROCEDURE — 85610 PROTHROMBIN TIME: CPT

## 2019-01-01 PROCEDURE — 80048 BASIC METABOLIC PNL TOTAL CA: CPT | Performed by: INTERNAL MEDICINE

## 2019-01-01 PROCEDURE — 85025 COMPLETE CBC W/AUTO DIFF WBC: CPT | Performed by: PHYSICIAN ASSISTANT

## 2019-01-01 PROCEDURE — 99239 HOSP IP/OBS DSCHRG MGMT >30: CPT | Performed by: FAMILY MEDICINE

## 2019-01-01 PROCEDURE — G8979 MOBILITY GOAL STATUS: HCPCS | Performed by: PHYSICAL THERAPIST

## 2019-01-01 PROCEDURE — 80053 COMPREHEN METABOLIC PANEL: CPT | Performed by: INTERNAL MEDICINE

## 2019-01-01 PROCEDURE — 85610 PROTHROMBIN TIME: CPT | Performed by: PHYSICIAN ASSISTANT

## 2019-01-01 PROCEDURE — 87086 URINE CULTURE/COLONY COUNT: CPT

## 2019-01-01 PROCEDURE — 71045 X-RAY EXAM CHEST 1 VIEW: CPT

## 2019-01-01 PROCEDURE — 99213 OFFICE O/P EST LOW 20 MIN: CPT | Performed by: FAMILY MEDICINE

## 2019-01-01 PROCEDURE — 84145 PROCALCITONIN (PCT): CPT | Performed by: FAMILY MEDICINE

## 2019-01-01 PROCEDURE — 87040 BLOOD CULTURE FOR BACTERIA: CPT | Performed by: EMERGENCY MEDICINE

## 2019-01-01 PROCEDURE — 99233 SBSQ HOSP IP/OBS HIGH 50: CPT | Performed by: FAMILY MEDICINE

## 2019-01-01 PROCEDURE — 99222 1ST HOSP IP/OBS MODERATE 55: CPT | Performed by: FAMILY MEDICINE

## 2019-01-01 PROCEDURE — 82805 BLOOD GASES W/O2 SATURATION: CPT | Performed by: EMERGENCY MEDICINE

## 2019-01-01 PROCEDURE — 84145 PROCALCITONIN (PCT): CPT | Performed by: INTERNAL MEDICINE

## 2019-01-01 PROCEDURE — 99214 OFFICE O/P EST MOD 30 MIN: CPT | Performed by: FAMILY MEDICINE

## 2019-01-01 PROCEDURE — 85025 COMPLETE CBC W/AUTO DIFF WBC: CPT | Performed by: EMERGENCY MEDICINE

## 2019-01-01 PROCEDURE — G0439 PPPS, SUBSEQ VISIT: HCPCS | Performed by: FAMILY MEDICINE

## 2019-01-01 PROCEDURE — 83605 ASSAY OF LACTIC ACID: CPT | Performed by: EMERGENCY MEDICINE

## 2019-01-01 PROCEDURE — 97167 OT EVAL HIGH COMPLEX 60 MIN: CPT

## 2019-01-01 PROCEDURE — 84145 PROCALCITONIN (PCT): CPT | Performed by: PHYSICIAN ASSISTANT

## 2019-01-01 PROCEDURE — 93971 EXTREMITY STUDY: CPT

## 2019-01-01 PROCEDURE — G8988 SELF CARE GOAL STATUS: HCPCS

## 2019-01-01 PROCEDURE — 94664 DEMO&/EVAL PT USE INHALER: CPT

## 2019-01-01 PROCEDURE — 80053 COMPREHEN METABOLIC PANEL: CPT | Performed by: EMERGENCY MEDICINE

## 2019-01-01 PROCEDURE — 97535 SELF CARE MNGMENT TRAINING: CPT

## 2019-01-01 PROCEDURE — 85049 AUTOMATED PLATELET COUNT: CPT | Performed by: FAMILY MEDICINE

## 2019-01-01 PROCEDURE — 93010 ELECTROCARDIOGRAM REPORT: CPT | Performed by: INTERNAL MEDICINE

## 2019-01-01 PROCEDURE — 87086 URINE CULTURE/COLONY COUNT: CPT | Performed by: PHYSICIAN ASSISTANT

## 2019-01-01 PROCEDURE — 80053 COMPREHEN METABOLIC PANEL: CPT | Performed by: FAMILY MEDICINE

## 2019-01-01 PROCEDURE — G8998 SWALLOW D/C STATUS: HCPCS

## 2019-01-01 PROCEDURE — 85027 COMPLETE CBC AUTOMATED: CPT | Performed by: FAMILY MEDICINE

## 2019-01-01 PROCEDURE — 82607 VITAMIN B-12: CPT | Performed by: INTERNAL MEDICINE

## 2019-01-01 PROCEDURE — 82805 BLOOD GASES W/O2 SATURATION: CPT | Performed by: INTERNAL MEDICINE

## 2019-01-01 PROCEDURE — 85610 PROTHROMBIN TIME: CPT | Performed by: EMERGENCY MEDICINE

## 2019-01-01 PROCEDURE — 84484 ASSAY OF TROPONIN QUANT: CPT | Performed by: FAMILY MEDICINE

## 2019-01-01 PROCEDURE — 84443 ASSAY THYROID STIM HORMONE: CPT | Performed by: FAMILY MEDICINE

## 2019-01-01 PROCEDURE — 97163 PT EVAL HIGH COMPLEX 45 MIN: CPT | Performed by: PHYSICAL THERAPIST

## 2019-01-01 PROCEDURE — 84100 ASSAY OF PHOSPHORUS: CPT | Performed by: INTERNAL MEDICINE

## 2019-01-01 PROCEDURE — 36415 COLL VENOUS BLD VENIPUNCTURE: CPT

## 2019-01-01 PROCEDURE — 36415 COLL VENOUS BLD VENIPUNCTURE: CPT | Performed by: FAMILY MEDICINE

## 2019-01-01 PROCEDURE — G8978 MOBILITY CURRENT STATUS: HCPCS | Performed by: PHYSICAL THERAPIST

## 2019-01-01 PROCEDURE — 71250 CT THORAX DX C-: CPT

## 2019-01-01 PROCEDURE — 92610 EVALUATE SWALLOWING FUNCTION: CPT

## 2019-01-01 PROCEDURE — 96361 HYDRATE IV INFUSION ADD-ON: CPT

## 2019-01-01 PROCEDURE — 85610 PROTHROMBIN TIME: CPT | Performed by: NURSE PRACTITIONER

## 2019-01-01 PROCEDURE — 85027 COMPLETE CBC AUTOMATED: CPT | Performed by: INTERNAL MEDICINE

## 2019-01-01 PROCEDURE — 80053 COMPREHEN METABOLIC PANEL: CPT | Performed by: PHYSICIAN ASSISTANT

## 2019-01-01 PROCEDURE — 81001 URINALYSIS AUTO W/SCOPE: CPT | Performed by: EMERGENCY MEDICINE

## 2019-01-01 PROCEDURE — 83735 ASSAY OF MAGNESIUM: CPT | Performed by: FAMILY MEDICINE

## 2019-01-01 PROCEDURE — 81001 URINALYSIS AUTO W/SCOPE: CPT

## 2019-01-01 PROCEDURE — 83880 ASSAY OF NATRIURETIC PEPTIDE: CPT | Performed by: EMERGENCY MEDICINE

## 2019-01-01 PROCEDURE — 99222 1ST HOSP IP/OBS MODERATE 55: CPT | Performed by: INTERNAL MEDICINE

## 2019-01-01 PROCEDURE — 87449 NOS EACH ORGANISM AG IA: CPT | Performed by: FAMILY MEDICINE

## 2019-01-01 PROCEDURE — 85007 BL SMEAR W/DIFF WBC COUNT: CPT | Performed by: INTERNAL MEDICINE

## 2019-01-01 PROCEDURE — 93306 TTE W/DOPPLER COMPLETE: CPT | Performed by: INTERNAL MEDICINE

## 2019-01-01 PROCEDURE — 1123F ACP DISCUSS/DSCN MKR DOCD: CPT | Performed by: FAMILY MEDICINE

## 2019-01-01 PROCEDURE — 99285 EMERGENCY DEPT VISIT HI MDM: CPT

## 2019-01-01 PROCEDURE — 71046 X-RAY EXAM CHEST 2 VIEWS: CPT

## 2019-01-01 PROCEDURE — 99232 SBSQ HOSP IP/OBS MODERATE 35: CPT | Performed by: PHYSICIAN ASSISTANT

## 2019-01-01 PROCEDURE — 99214 OFFICE O/P EST MOD 30 MIN: CPT | Performed by: INTERNAL MEDICINE

## 2019-01-01 PROCEDURE — G8987 SELF CARE CURRENT STATUS: HCPCS

## 2019-01-01 PROCEDURE — 97163 PT EVAL HIGH COMPLEX 45 MIN: CPT

## 2019-01-01 PROCEDURE — 83690 ASSAY OF LIPASE: CPT | Performed by: EMERGENCY MEDICINE

## 2019-01-01 PROCEDURE — 70450 CT HEAD/BRAIN W/O DYE: CPT

## 2019-01-01 PROCEDURE — 92526 ORAL FUNCTION THERAPY: CPT

## 2019-01-01 PROCEDURE — 83605 ASSAY OF LACTIC ACID: CPT | Performed by: INTERNAL MEDICINE

## 2019-01-01 PROCEDURE — 82746 ASSAY OF FOLIC ACID SERUM: CPT | Performed by: INTERNAL MEDICINE

## 2019-01-01 PROCEDURE — 82805 BLOOD GASES W/O2 SATURATION: CPT | Performed by: PHYSICIAN ASSISTANT

## 2019-01-01 PROCEDURE — 96365 THER/PROPH/DIAG IV INF INIT: CPT

## 2019-01-01 RX ORDER — FUROSEMIDE 40 MG/1
40 TABLET ORAL DAILY
Status: DISCONTINUED | OUTPATIENT
Start: 2019-01-01 | End: 2019-01-01

## 2019-01-01 RX ORDER — DILTIAZEM HYDROCHLORIDE 120 MG/1
120 CAPSULE, COATED, EXTENDED RELEASE ORAL DAILY
Qty: 30 CAPSULE | Refills: 5 | Status: SHIPPED | OUTPATIENT
Start: 2019-01-01 | End: 2019-01-01 | Stop reason: SDUPTHER

## 2019-01-01 RX ORDER — METOPROLOL TARTRATE 5 MG/5ML
5 INJECTION INTRAVENOUS ONCE
Status: COMPLETED | OUTPATIENT
Start: 2019-01-01 | End: 2019-01-01

## 2019-01-01 RX ORDER — METOPROLOL TARTRATE 5 MG/5ML
5 INJECTION INTRAVENOUS EVERY 4 HOURS
Status: DISCONTINUED | OUTPATIENT
Start: 2019-01-01 | End: 2019-01-01

## 2019-01-01 RX ORDER — WARFARIN SODIUM 4 MG/1
4 TABLET ORAL
Status: DISCONTINUED | OUTPATIENT
Start: 2019-01-01 | End: 2019-01-01

## 2019-01-01 RX ORDER — LISINOPRIL 10 MG/1
10 TABLET ORAL DAILY
Status: DISCONTINUED | OUTPATIENT
Start: 2019-01-01 | End: 2019-01-01

## 2019-01-01 RX ORDER — POTASSIUM CHLORIDE 20 MEQ/1
40 TABLET, EXTENDED RELEASE ORAL 2 TIMES DAILY WITH MEALS
Status: COMPLETED | OUTPATIENT
Start: 2019-01-01 | End: 2019-01-01

## 2019-01-01 RX ORDER — FUROSEMIDE 10 MG/ML
40 INJECTION INTRAMUSCULAR; INTRAVENOUS
Status: DISCONTINUED | OUTPATIENT
Start: 2019-01-01 | End: 2019-01-01

## 2019-01-01 RX ORDER — ACETAMINOPHEN 325 MG/1
650 TABLET ORAL EVERY 6 HOURS PRN
Status: DISCONTINUED | OUTPATIENT
Start: 2019-01-01 | End: 2019-01-01 | Stop reason: HOSPADM

## 2019-01-01 RX ORDER — MAGNESIUM HYDROXIDE/ALUMINUM HYDROXICE/SIMETHICONE 120; 1200; 1200 MG/30ML; MG/30ML; MG/30ML
30 SUSPENSION ORAL EVERY 6 HOURS PRN
Status: DISCONTINUED | OUTPATIENT
Start: 2019-01-01 | End: 2019-01-01 | Stop reason: HOSPADM

## 2019-01-01 RX ORDER — FUROSEMIDE 40 MG/1
40 TABLET ORAL DAILY
Qty: 90 TABLET | Refills: 1 | Status: SHIPPED | OUTPATIENT
Start: 2019-01-01

## 2019-01-01 RX ORDER — HEPARIN SODIUM 5000 [USP'U]/ML
5000 INJECTION, SOLUTION INTRAVENOUS; SUBCUTANEOUS EVERY 8 HOURS SCHEDULED
Status: DISCONTINUED | OUTPATIENT
Start: 2019-01-01 | End: 2019-01-01

## 2019-01-01 RX ORDER — OXYCODONE HYDROCHLORIDE AND ACETAMINOPHEN 5; 325 MG/1; MG/1
1 TABLET ORAL EVERY 6 HOURS PRN
Status: DISCONTINUED | OUTPATIENT
Start: 2019-01-01 | End: 2019-01-01

## 2019-01-01 RX ORDER — ACETAMINOPHEN 325 MG/1
650 TABLET ORAL EVERY 6 HOURS PRN
Status: DISCONTINUED | OUTPATIENT
Start: 2019-01-01 | End: 2019-01-01

## 2019-01-01 RX ORDER — WARFARIN SODIUM 3 MG/1
3 TABLET ORAL
Status: DISCONTINUED | OUTPATIENT
Start: 2019-01-01 | End: 2019-01-01

## 2019-01-01 RX ORDER — FUROSEMIDE 10 MG/ML
40 INJECTION INTRAMUSCULAR; INTRAVENOUS ONCE
Status: COMPLETED | OUTPATIENT
Start: 2019-01-01 | End: 2019-01-01

## 2019-01-01 RX ORDER — FUROSEMIDE 10 MG/ML
40 INJECTION INTRAMUSCULAR; INTRAVENOUS DAILY
Status: DISCONTINUED | OUTPATIENT
Start: 2019-01-01 | End: 2019-01-01

## 2019-01-01 RX ORDER — DILTIAZEM HYDROCHLORIDE 120 MG/1
120 CAPSULE, COATED, EXTENDED RELEASE ORAL DAILY
Status: DISCONTINUED | OUTPATIENT
Start: 2019-01-01 | End: 2019-01-01

## 2019-01-01 RX ORDER — LEVALBUTEROL INHALATION SOLUTION 0.63 MG/3ML
0.63 SOLUTION RESPIRATORY (INHALATION) EVERY 6 HOURS PRN
Refills: 0
Start: 2019-01-01

## 2019-01-01 RX ORDER — SODIUM CHLORIDE 9 MG/ML
75 INJECTION, SOLUTION INTRAVENOUS CONTINUOUS
Status: DISCONTINUED | OUTPATIENT
Start: 2019-01-01 | End: 2019-01-01

## 2019-01-01 RX ORDER — POTASSIUM CHLORIDE 20 MEQ/1
20 TABLET, EXTENDED RELEASE ORAL DAILY
Qty: 90 TABLET | Refills: 1 | Status: SHIPPED | OUTPATIENT
Start: 2019-01-01 | End: 2019-01-01 | Stop reason: SDUPTHER

## 2019-01-01 RX ORDER — POTASSIUM CHLORIDE 20 MEQ/1
20 TABLET, EXTENDED RELEASE ORAL DAILY
Qty: 30 TABLET | Refills: 5 | Status: SHIPPED | OUTPATIENT
Start: 2019-01-01 | End: 2019-01-01 | Stop reason: SDUPTHER

## 2019-01-01 RX ORDER — FUROSEMIDE 40 MG/1
40 TABLET ORAL DAILY
Qty: 30 TABLET | Refills: 5 | Status: SHIPPED | OUTPATIENT
Start: 2019-01-01 | End: 2019-01-01 | Stop reason: SDUPTHER

## 2019-01-01 RX ORDER — 0.9 % SODIUM CHLORIDE 0.9 %
3 VIAL (ML) INJECTION AS NEEDED
Status: DISCONTINUED | OUTPATIENT
Start: 2019-01-01 | End: 2019-01-01

## 2019-01-01 RX ORDER — SODIUM CHLORIDE AND POTASSIUM CHLORIDE .9; .15 G/100ML; G/100ML
75 SOLUTION INTRAVENOUS CONTINUOUS
Status: DISCONTINUED | OUTPATIENT
Start: 2019-01-01 | End: 2019-01-01

## 2019-01-01 RX ORDER — METOPROLOL TARTRATE 50 MG/1
50 TABLET, FILM COATED ORAL EVERY 12 HOURS SCHEDULED
Status: DISCONTINUED | OUTPATIENT
Start: 2019-01-01 | End: 2019-01-01

## 2019-01-01 RX ORDER — TRIAMCINOLONE ACETONIDE 1 MG/G
CREAM TOPICAL 2 TIMES DAILY
Status: DISCONTINUED | OUTPATIENT
Start: 2019-01-01 | End: 2019-01-01 | Stop reason: HOSPADM

## 2019-01-01 RX ORDER — ENALAPRIL MALEATE 10 MG/1
TABLET ORAL
Qty: 60 TABLET | Refills: 5 | Status: SHIPPED | OUTPATIENT
Start: 2019-01-01 | End: 2019-01-01 | Stop reason: SDUPTHER

## 2019-01-01 RX ORDER — DILTIAZEM HYDROCHLORIDE 5 MG/ML
10 INJECTION INTRAVENOUS ONCE
Status: COMPLETED | OUTPATIENT
Start: 2019-01-01 | End: 2019-01-01

## 2019-01-01 RX ORDER — LEVOFLOXACIN 5 MG/ML
750 INJECTION, SOLUTION INTRAVENOUS
Status: DISCONTINUED | OUTPATIENT
Start: 2019-01-01 | End: 2019-01-01

## 2019-01-01 RX ORDER — OXYCODONE HYDROCHLORIDE AND ACETAMINOPHEN 5; 325 MG/1; MG/1
1 TABLET ORAL EVERY 12 HOURS PRN
Qty: 60 TABLET | Refills: 0 | Status: SHIPPED | OUTPATIENT
Start: 2019-01-01 | End: 2019-01-01 | Stop reason: SDUPTHER

## 2019-01-01 RX ORDER — POTASSIUM CHLORIDE 20 MEQ/1
40 TABLET, EXTENDED RELEASE ORAL 2 TIMES DAILY WITH MEALS
Status: DISCONTINUED | OUTPATIENT
Start: 2019-01-01 | End: 2019-01-01

## 2019-01-01 RX ORDER — METOPROLOL TARTRATE 5 MG/5ML
5 INJECTION INTRAVENOUS EVERY 4 HOURS PRN
Status: DISCONTINUED | OUTPATIENT
Start: 2019-01-01 | End: 2019-01-01 | Stop reason: HOSPADM

## 2019-01-01 RX ORDER — LEVALBUTEROL INHALATION SOLUTION 0.63 MG/3ML
0.63 SOLUTION RESPIRATORY (INHALATION) EVERY 6 HOURS PRN
Status: DISCONTINUED | OUTPATIENT
Start: 2019-01-01 | End: 2019-01-01 | Stop reason: HOSPADM

## 2019-01-01 RX ORDER — WARFARIN SODIUM 2 MG/1
2 TABLET ORAL
Status: DISCONTINUED | OUTPATIENT
Start: 2019-01-01 | End: 2019-01-01

## 2019-01-01 RX ORDER — OSTOMY ADHESIVE
1 STRIP MISCELLANEOUS WEEKLY
Status: DISCONTINUED | OUTPATIENT
Start: 2019-01-01 | End: 2019-01-01 | Stop reason: RX

## 2019-01-01 RX ORDER — DOCUSATE SODIUM 100 MG/1
100 CAPSULE, LIQUID FILLED ORAL 2 TIMES DAILY PRN
Qty: 10 CAPSULE | Refills: 0 | Status: SHIPPED | OUTPATIENT
Start: 2019-01-01

## 2019-01-01 RX ORDER — LISINOPRIL 20 MG/1
40 TABLET ORAL DAILY
Status: DISCONTINUED | OUTPATIENT
Start: 2019-01-01 | End: 2019-01-01

## 2019-01-01 RX ORDER — POTASSIUM CHLORIDE 20 MEQ/1
20 TABLET, EXTENDED RELEASE ORAL DAILY
Qty: 115 TABLET | Refills: 1 | Status: SHIPPED | OUTPATIENT
Start: 2019-01-01

## 2019-01-01 RX ORDER — SODIUM CHLORIDE FOR INHALATION 0.9 %
3 VIAL, NEBULIZER (ML) INHALATION EVERY 6 HOURS PRN
Status: DISCONTINUED | OUTPATIENT
Start: 2019-01-01 | End: 2019-01-01

## 2019-01-01 RX ORDER — WARFARIN SODIUM 2 MG/1
TABLET ORAL
Qty: 30 TABLET | Refills: 0
Start: 2019-01-01

## 2019-01-01 RX ORDER — WARFARIN SODIUM 2 MG/1
2 TABLET ORAL
Status: DISCONTINUED | OUTPATIENT
Start: 2019-01-01 | End: 2019-01-01 | Stop reason: HOSPADM

## 2019-01-01 RX ORDER — POTASSIUM CHLORIDE 20 MEQ/1
20 TABLET, EXTENDED RELEASE ORAL ONCE
Status: COMPLETED | OUTPATIENT
Start: 2019-01-01 | End: 2019-01-01

## 2019-01-01 RX ORDER — DOCUSATE SODIUM 100 MG/1
100 CAPSULE, LIQUID FILLED ORAL 2 TIMES DAILY PRN
Status: DISCONTINUED | OUTPATIENT
Start: 2019-01-01 | End: 2019-01-01 | Stop reason: HOSPADM

## 2019-01-01 RX ORDER — OXYCODONE HYDROCHLORIDE AND ACETAMINOPHEN 5; 325 MG/1; MG/1
1 TABLET ORAL EVERY 12 HOURS PRN
Qty: 60 TABLET | Refills: 0 | Status: SHIPPED | OUTPATIENT
Start: 2019-01-01 | End: 2019-01-01 | Stop reason: HOSPADM

## 2019-01-01 RX ORDER — ENALAPRIL MALEATE 10 MG/1
10 TABLET ORAL 2 TIMES DAILY
Qty: 180 TABLET | Refills: 1 | Status: SHIPPED | OUTPATIENT
Start: 2019-01-01

## 2019-01-01 RX ORDER — ACETAMINOPHEN 325 MG/1
650 TABLET ORAL EVERY 6 HOURS PRN
Qty: 30 TABLET | Refills: 0
Start: 2019-01-01

## 2019-01-01 RX ORDER — FUROSEMIDE 10 MG/ML
40 INJECTION INTRAMUSCULAR; INTRAVENOUS
Status: DISCONTINUED | OUTPATIENT
Start: 2019-01-01 | End: 2019-01-01 | Stop reason: HOSPADM

## 2019-01-01 RX ORDER — POTASSIUM CHLORIDE 20 MEQ/1
20 TABLET, EXTENDED RELEASE ORAL DAILY
Status: DISCONTINUED | OUTPATIENT
Start: 2019-01-01 | End: 2019-01-01

## 2019-01-01 RX ORDER — DILTIAZEM HYDROCHLORIDE 120 MG/1
120 CAPSULE, COATED, EXTENDED RELEASE ORAL DAILY
Qty: 90 CAPSULE | Refills: 1 | Status: SHIPPED | OUTPATIENT
Start: 2019-01-01

## 2019-01-01 RX ORDER — ENALAPRIL MALEATE 10 MG/1
10 TABLET ORAL 2 TIMES DAILY
Qty: 180 TABLET | Refills: 1 | Status: SHIPPED | OUTPATIENT
Start: 2019-01-01 | End: 2019-01-01 | Stop reason: SDUPTHER

## 2019-01-01 RX ORDER — METOPROLOL TARTRATE 5 MG/5ML
5 INJECTION INTRAVENOUS EVERY 6 HOURS PRN
Status: DISCONTINUED | OUTPATIENT
Start: 2019-01-01 | End: 2019-01-01

## 2019-01-01 RX ORDER — ONDANSETRON 2 MG/ML
4 INJECTION INTRAMUSCULAR; INTRAVENOUS EVERY 6 HOURS PRN
Status: DISCONTINUED | OUTPATIENT
Start: 2019-01-01 | End: 2019-01-01 | Stop reason: HOSPADM

## 2019-01-01 RX ORDER — OSTOMY ADHESIVE
1 STRIP MISCELLANEOUS WEEKLY
Qty: 6 EACH | Refills: 10 | Status: SHIPPED | OUTPATIENT
Start: 2019-01-01 | End: 2019-01-01 | Stop reason: HOSPADM

## 2019-01-01 RX ORDER — LEVOFLOXACIN 5 MG/ML
750 INJECTION, SOLUTION INTRAVENOUS ONCE
Status: COMPLETED | OUTPATIENT
Start: 2019-01-01 | End: 2019-01-01

## 2019-01-01 RX ORDER — LEVALBUTEROL 1.25 MG/.5ML
1.25 SOLUTION, CONCENTRATE RESPIRATORY (INHALATION) EVERY 6 HOURS PRN
Status: DISCONTINUED | OUTPATIENT
Start: 2019-01-01 | End: 2019-01-01

## 2019-01-01 RX ORDER — WARFARIN SODIUM 3 MG/1
3 TABLET ORAL
Qty: 30 TABLET | Refills: 5 | Status: SHIPPED | OUTPATIENT
Start: 2019-01-01 | End: 2019-01-01 | Stop reason: HOSPADM

## 2019-01-01 RX ADMIN — VANCOMYCIN HYDROCHLORIDE 1000 MG: 1 INJECTION, POWDER, LYOPHILIZED, FOR SOLUTION INTRAVENOUS at 16:47

## 2019-01-01 RX ADMIN — OXYCODONE HYDROCHLORIDE AND ACETAMINOPHEN 1 TABLET: 5; 325 TABLET ORAL at 19:10

## 2019-01-01 RX ADMIN — METOPROLOL TARTRATE 25 MG: 25 TABLET, FILM COATED ORAL at 12:07

## 2019-01-01 RX ADMIN — METOPROLOL TARTRATE 50 MG: 50 TABLET, FILM COATED ORAL at 22:17

## 2019-01-01 RX ADMIN — FUROSEMIDE 40 MG: 10 INJECTION, SOLUTION INTRAMUSCULAR; INTRAVENOUS at 16:56

## 2019-01-01 RX ADMIN — WARFARIN SODIUM 2 MG: 2 TABLET ORAL at 18:43

## 2019-01-01 RX ADMIN — ENOXAPARIN SODIUM 30 MG: 30 INJECTION SUBCUTANEOUS at 08:04

## 2019-01-01 RX ADMIN — POTASSIUM CHLORIDE 40 MEQ: 1500 TABLET, EXTENDED RELEASE ORAL at 17:30

## 2019-01-01 RX ADMIN — ENOXAPARIN SODIUM 30 MG: 30 INJECTION SUBCUTANEOUS at 08:20

## 2019-01-01 RX ADMIN — DILTIAZEM HYDROCHLORIDE 120 MG: 120 CAPSULE, COATED, EXTENDED RELEASE ORAL at 08:20

## 2019-01-01 RX ADMIN — FUROSEMIDE 40 MG: 10 INJECTION, SOLUTION INTRAMUSCULAR; INTRAVENOUS at 09:00

## 2019-01-01 RX ADMIN — DILTIAZEM HYDROCHLORIDE 7.5 MG/HR: 5 INJECTION INTRAVENOUS at 16:54

## 2019-01-01 RX ADMIN — DILTIAZEM HYDROCHLORIDE 30 MG: 30 TABLET, FILM COATED ORAL at 18:43

## 2019-01-01 RX ADMIN — METOPROLOL TARTRATE 5 MG: 5 INJECTION, SOLUTION INTRAVENOUS at 16:16

## 2019-01-01 RX ADMIN — METOPROLOL TARTRATE 50 MG: 50 TABLET, FILM COATED ORAL at 09:45

## 2019-01-01 RX ADMIN — MAGNESIUM OXIDE TAB 400 MG (240 MG ELEMENTAL MG) 400 MG: 400 (240 MG) TAB at 08:20

## 2019-01-01 RX ADMIN — METOPROLOL TARTRATE 5 MG: 5 INJECTION, SOLUTION INTRAVENOUS at 19:10

## 2019-01-01 RX ADMIN — SODIUM CHLORIDE 500 ML: 0.9 INJECTION, SOLUTION INTRAVENOUS at 00:17

## 2019-01-01 RX ADMIN — MULTIPLE VITAMINS W/ MINERALS TAB 1 TABLET: TAB at 09:51

## 2019-01-01 RX ADMIN — METOPROLOL TARTRATE 5 MG: 5 INJECTION, SOLUTION INTRAVENOUS at 12:10

## 2019-01-01 RX ADMIN — LEVOFLOXACIN 750 MG: 5 INJECTION, SOLUTION INTRAVENOUS at 21:24

## 2019-01-01 RX ADMIN — MAGNESIUM OXIDE TAB 400 MG (240 MG ELEMENTAL MG) 400 MG: 400 (240 MG) TAB at 09:32

## 2019-01-01 RX ADMIN — TRIAMCINOLONE ACETONIDE: 1 CREAM TOPICAL at 09:10

## 2019-01-01 RX ADMIN — FUROSEMIDE 40 MG: 40 TABLET ORAL at 08:20

## 2019-01-01 RX ADMIN — METOPROLOL TARTRATE 5 MG: 5 INJECTION, SOLUTION INTRAVENOUS at 00:34

## 2019-01-01 RX ADMIN — DILTIAZEM HYDROCHLORIDE 30 MG: 30 TABLET, FILM COATED ORAL at 00:22

## 2019-01-01 RX ADMIN — FUROSEMIDE 40 MG: 10 INJECTION, SOLUTION INTRAMUSCULAR; INTRAVENOUS at 09:29

## 2019-01-01 RX ADMIN — DILTIAZEM HYDROCHLORIDE 30 MG: 30 TABLET, FILM COATED ORAL at 18:53

## 2019-01-01 RX ADMIN — MULTIPLE VITAMINS W/ MINERALS TAB 1 TABLET: TAB at 08:20

## 2019-01-01 RX ADMIN — DILTIAZEM HYDROCHLORIDE 30 MG: 30 TABLET, FILM COATED ORAL at 06:21

## 2019-01-01 RX ADMIN — METOPROLOL TARTRATE 5 MG: 5 INJECTION, SOLUTION INTRAVENOUS at 02:23

## 2019-01-01 RX ADMIN — SODIUM CHLORIDE AND POTASSIUM CHLORIDE 75 ML/HR: .9; .15 SOLUTION INTRAVENOUS at 00:25

## 2019-01-01 RX ADMIN — DILTIAZEM HYDROCHLORIDE 30 MG: 30 TABLET, FILM COATED ORAL at 12:46

## 2019-01-01 RX ADMIN — SODIUM CHLORIDE 250 ML: 0.9 INJECTION, SOLUTION INTRAVENOUS at 19:44

## 2019-01-01 RX ADMIN — METOPROLOL TARTRATE 25 MG: 25 TABLET, FILM COATED ORAL at 10:16

## 2019-01-01 RX ADMIN — POTASSIUM CHLORIDE 20 MEQ: 1500 TABLET, EXTENDED RELEASE ORAL at 08:04

## 2019-01-01 RX ADMIN — POTASSIUM CHLORIDE 20 MEQ: 1500 TABLET, EXTENDED RELEASE ORAL at 08:20

## 2019-01-01 RX ADMIN — MULTIPLE VITAMINS W/ MINERALS TAB 1 TABLET: TAB at 09:45

## 2019-01-01 RX ADMIN — OXYCODONE HYDROCHLORIDE AND ACETAMINOPHEN 1 TABLET: 5; 325 TABLET ORAL at 09:32

## 2019-01-01 RX ADMIN — METOPROLOL TARTRATE 25 MG: 25 TABLET, FILM COATED ORAL at 21:57

## 2019-01-01 RX ADMIN — FUROSEMIDE 40 MG: 10 INJECTION, SOLUTION INTRAMUSCULAR; INTRAVENOUS at 09:50

## 2019-01-01 RX ADMIN — TRIAMCINOLONE ACETONIDE: 1 CREAM TOPICAL at 10:12

## 2019-01-01 RX ADMIN — VANCOMYCIN HYDROCHLORIDE 1000 MG: 1 INJECTION, POWDER, LYOPHILIZED, FOR SOLUTION INTRAVENOUS at 18:03

## 2019-01-01 RX ADMIN — DILTIAZEM HYDROCHLORIDE 30 MG: 30 TABLET, FILM COATED ORAL at 13:45

## 2019-01-01 RX ADMIN — MAGNESIUM OXIDE TAB 400 MG (240 MG ELEMENTAL MG) 400 MG: 400 (240 MG) TAB at 09:24

## 2019-01-01 RX ADMIN — SODIUM CHLORIDE 500 ML: 0.9 INJECTION, SOLUTION INTRAVENOUS at 23:18

## 2019-01-01 RX ADMIN — METOPROLOL TARTRATE 50 MG: 50 TABLET, FILM COATED ORAL at 09:23

## 2019-01-01 RX ADMIN — FUROSEMIDE 40 MG: 10 INJECTION, SOLUTION INTRAMUSCULAR; INTRAVENOUS at 16:46

## 2019-01-01 RX ADMIN — METOPROLOL TARTRATE 5 MG: 5 INJECTION, SOLUTION INTRAVENOUS at 03:09

## 2019-01-01 RX ADMIN — FUROSEMIDE 40 MG: 10 INJECTION, SOLUTION INTRAMUSCULAR; INTRAVENOUS at 09:32

## 2019-01-01 RX ADMIN — PSYLLIUM HUSK 1 PACKET: 3.4 POWDER ORAL at 09:00

## 2019-01-01 RX ADMIN — METOPROLOL TARTRATE 5 MG: 5 INJECTION, SOLUTION INTRAVENOUS at 10:35

## 2019-01-01 RX ADMIN — WARFARIN SODIUM 2 MG: 2 TABLET ORAL at 18:53

## 2019-01-01 RX ADMIN — FUROSEMIDE 40 MG: 10 INJECTION, SOLUTION INTRAMUSCULAR; INTRAVENOUS at 09:45

## 2019-01-01 RX ADMIN — TRIAMCINOLONE ACETONIDE: 1 CREAM TOPICAL at 18:55

## 2019-01-01 RX ADMIN — METOPROLOL TARTRATE 25 MG: 25 TABLET, FILM COATED ORAL at 16:56

## 2019-01-01 RX ADMIN — TRIAMCINOLONE ACETONIDE: 1 CREAM TOPICAL at 12:18

## 2019-01-01 RX ADMIN — DILTIAZEM HYDROCHLORIDE 10 MG: 5 INJECTION INTRAVENOUS at 00:22

## 2019-01-01 RX ADMIN — MAGNESIUM OXIDE TAB 400 MG (240 MG ELEMENTAL MG) 400 MG: 400 (240 MG) TAB at 09:00

## 2019-01-01 RX ADMIN — LISINOPRIL 10 MG: 10 TABLET ORAL at 09:48

## 2019-01-01 RX ADMIN — DILTIAZEM HYDROCHLORIDE 10 MG: 5 INJECTION INTRAVENOUS at 02:52

## 2019-01-01 RX ADMIN — LISINOPRIL 40 MG: 20 TABLET ORAL at 08:20

## 2019-01-01 RX ADMIN — MULTIPLE VITAMINS W/ MINERALS TAB 1 TABLET: TAB at 09:00

## 2019-01-01 RX ADMIN — LEVOFLOXACIN 750 MG: 5 INJECTION, SOLUTION INTRAVENOUS at 21:17

## 2019-01-01 RX ADMIN — MULTIPLE VITAMINS W/ MINERALS TAB 1 TABLET: TAB at 09:32

## 2019-01-01 RX ADMIN — METOPROLOL TARTRATE 5 MG: 5 INJECTION, SOLUTION INTRAVENOUS at 05:55

## 2019-01-01 RX ADMIN — TRIAMCINOLONE ACETONIDE: 1 CREAM TOPICAL at 09:45

## 2019-01-01 RX ADMIN — MULTIPLE VITAMINS W/ MINERALS TAB 1 TABLET: TAB at 08:04

## 2019-01-01 RX ADMIN — MAGNESIUM OXIDE TAB 400 MG (240 MG ELEMENTAL MG) 400 MG: 400 (240 MG) TAB at 09:48

## 2019-01-01 RX ADMIN — METOPROLOL TARTRATE 25 MG: 25 TABLET, FILM COATED ORAL at 22:00

## 2019-01-01 RX ADMIN — WARFARIN SODIUM 4 MG: 4 TABLET ORAL at 17:08

## 2019-01-01 RX ADMIN — DILTIAZEM HYDROCHLORIDE 30 MG: 30 TABLET, FILM COATED ORAL at 23:20

## 2019-01-01 RX ADMIN — DILTIAZEM HYDROCHLORIDE 5 MG/HR: 5 INJECTION INTRAVENOUS at 08:35

## 2019-01-01 RX ADMIN — VANCOMYCIN HYDROCHLORIDE 1000 MG: 1 INJECTION, POWDER, LYOPHILIZED, FOR SOLUTION INTRAVENOUS at 18:00

## 2019-01-01 RX ADMIN — POTASSIUM CHLORIDE 40 MEQ: 1500 TABLET, EXTENDED RELEASE ORAL at 13:45

## 2019-01-01 RX ADMIN — FUROSEMIDE 40 MG: 10 INJECTION, SOLUTION INTRAMUSCULAR; INTRAVENOUS at 16:30

## 2019-01-01 RX ADMIN — MULTIPLE VITAMINS W/ MINERALS TAB 1 TABLET: TAB at 09:24

## 2019-01-01 RX ADMIN — METOPROLOL TARTRATE 25 MG: 25 TABLET, FILM COATED ORAL at 08:20

## 2019-01-01 RX ADMIN — POTASSIUM CHLORIDE 40 MEQ: 1500 TABLET, EXTENDED RELEASE ORAL at 16:43

## 2019-01-01 RX ADMIN — METOPROLOL TARTRATE 50 MG: 50 TABLET, FILM COATED ORAL at 09:00

## 2019-01-01 RX ADMIN — METOPROLOL TARTRATE 50 MG: 50 TABLET, FILM COATED ORAL at 21:54

## 2019-01-01 RX ADMIN — POTASSIUM CHLORIDE 20 MEQ: 1500 TABLET, EXTENDED RELEASE ORAL at 09:29

## 2019-01-01 RX ADMIN — PSYLLIUM HUSK 1 PACKET: 3.4 POWDER ORAL at 09:45

## 2019-01-01 RX ADMIN — WARFARIN SODIUM 3 MG: 3 TABLET ORAL at 17:55

## 2019-01-01 RX ADMIN — WARFARIN SODIUM 2 MG: 2 TABLET ORAL at 18:07

## 2019-01-01 RX ADMIN — POTASSIUM CHLORIDE 40 MEQ: 1500 TABLET, EXTENDED RELEASE ORAL at 12:10

## 2019-01-01 RX ADMIN — FUROSEMIDE 40 MG: 10 INJECTION, SOLUTION INTRAMUSCULAR; INTRAVENOUS at 17:08

## 2019-01-01 RX ADMIN — MAGNESIUM OXIDE TAB 400 MG (240 MG ELEMENTAL MG) 400 MG: 400 (240 MG) TAB at 08:04

## 2019-01-01 RX ADMIN — METOPROLOL TARTRATE 50 MG: 50 TABLET, FILM COATED ORAL at 22:26

## 2019-01-01 RX ADMIN — LISINOPRIL 40 MG: 20 TABLET ORAL at 08:04

## 2019-01-01 RX ADMIN — WARFARIN SODIUM 3 MG: 3 TABLET ORAL at 23:29

## 2019-01-01 RX ADMIN — PSYLLIUM HUSK 1 PACKET: 3.4 POWDER ORAL at 09:38

## 2019-01-01 RX ADMIN — LEVOFLOXACIN 750 MG: 5 INJECTION, SOLUTION INTRAVENOUS at 21:51

## 2019-01-30 NOTE — PROGRESS NOTES
Assessment/Plan:    No problem-specific Assessment & Plan notes found for this encounter  Diagnoses and all orders for this visit:    Essential hypertension    Permanent atrial fibrillation ([facility] [address] )          Subjective:      Patient ID: Ayad Raymond is a 80 y o  female  Follow-up visit on Mrs Luis A Catherine for hypertension atrial fibrillation she has sought her cardiologist Dr Adrián Macias and he is well pleased with her progress she has no ankle edema legs look really good her family has been very good about keeping up with her legs and at slight a sign of them breaking down her getting red they apply Unna boot on their own which shot clears up the problem she is eating well she has a terrific appetite and she seems quite comfortable and well        The following portions of the patient's history were reviewed and updated as appropriate:   She  has a past medical history of Allergic drug reaction; Cellulitis, leg; Chronic atrial fibrillation ([address] ); Dilated cardiomyopathy ([facility] [address] ); Essential (primary) hypertension; Heart failure, unspecified ([facility] [address] ); History of echocardiogram (10/28/2015); History of echocardiogram (01/03/2018); History of echocardiogram (06/14/2017); Long term (current) use of anticoagulants; Lymphedema, not elsewhere classified; Mitral regurgitation; Pancreatitis; Rheumatic mitral insufficiency; Rheumatoid arthritis ([address] ); and Stasis dermatitis    She   Patient Active Problem List    Diagnosis Date Noted    Bradycardia 07/26/2018    Congestive heart failure (CHF) ([address] ) 07/24/2018    Primary cardiomyopathy ([facility] [address] ) 07/24/2018    Mitral regurgitation 07/24/2018    Renal insufficiency 07/24/2018    Primary osteoarthritis of both knees 03/15/2017    Chronic venous stasis dermatitis of both lower extremities 08/18/2015    Ambulatory dysfunction 11/07/2014    Anxiety 06/03/2014    Closed fracture of lumbar vertebra ([facility] [address] ) 07/02/2013    Atrial fibrillation ([address] ) 07/02/2012    Hypertension 07/02/2012 She  has a past surgical history that includes Cholecystectomy and Tooth extraction  Her family history includes Coronary artery disease in her mother; Diabetes in her sister; No Known Problems in her brother, maternal grandfather, maternal grandmother, paternal grandfather, and paternal grandmother; Prostate cancer in her father  She  reports that she has never smoked  She has never used smokeless tobacco  She reports that she does not drink alcohol or use drugs    Current Outpatient Prescriptions   Medication Sig Dispense Refill    diltiazem (CARDIZEM CD) 120 mg 24 hr capsule Take 1 capsule (120 mg total) by mouth daily 30 capsule 5    enalapril (VASOTEC) 10 mg tablet Take 1 tablet (10 mg total) by mouth 2 (two) times a day 60 tablet 5    furosemide (LASIX) 40 mg tablet Take 1 tablet (40 mg total) by mouth daily 30 tablet 5    magnesium chloride (MAG64) 64 MG TBEC EC tablet Take 64 mg by mouth daily      metoprolol tartrate (LOPRESSOR) 25 mg tablet Take 1 tablet (25 mg total) by mouth daily 30 tablet 5    Multiple Vitamins-Minerals (PRESERVISION AREDS 2 PO) Take 1 capsule by mouth 2 (two) times a day      oxyCODONE-acetaminophen (PERCOCET) 5-325 mg per tablet Take 1 tablet by mouth every 12 (twelve) hours as needed for moderate pain or severe pain Earliest Fill Date: 12/27/18 Max Daily Amount: 2 tablets 60 tablet 0    potassium chloride (KLOR-CON M20) 20 mEq tablet Take 1 tablet (20 mEq total) by mouth daily 30 tablet 5    triamcinolone (KENALOG) 0 1 % cream Apply topically 2 (two) times a day 454 g 1    warfarin (COUMADIN) 1 mg tablet One tablet every Wed  w/ 3 mg tab (4mg) OR as directed by physician 15 tablet 1    warfarin (COUMADIN) 3 mg tablet Take 1 tablet (3 mg total) by mouth once for 1 dose Take as directed 30 tablet 5    Wound Dressings (TENDERWRAP UNNA BOOT BANDAGE) MISC Apply 4 each topically every 3 (three) days for 30 days 6 each 5     No current facility-administered medications for this visit  Current Outpatient Prescriptions on File Prior to Visit   Medication Sig    diltiazem (CARDIZEM CD) 120 mg 24 hr capsule Take 1 capsule (120 mg total) by mouth daily    enalapril (VASOTEC) 10 mg tablet Take 1 tablet (10 mg total) by mouth 2 (two) times a day    furosemide (LASIX) 40 mg tablet Take 1 tablet (40 mg total) by mouth daily    magnesium chloride (MAG64) 64 MG TBEC EC tablet Take 64 mg by mouth daily    metoprolol tartrate (LOPRESSOR) 25 mg tablet Take 1 tablet (25 mg total) by mouth daily    Multiple Vitamins-Minerals (PRESERVISION AREDS 2 PO) Take 1 capsule by mouth 2 (two) times a day    oxyCODONE-acetaminophen (PERCOCET) 5-325 mg per tablet Take 1 tablet by mouth every 12 (twelve) hours as needed for moderate pain or severe pain Earliest Fill Date: 12/27/18 Max Daily Amount: 2 tablets    potassium chloride (KLOR-CON M20) 20 mEq tablet Take 1 tablet (20 mEq total) by mouth daily    triamcinolone (KENALOG) 0 1 % cream Apply topically 2 (two) times a day    warfarin (COUMADIN) 1 mg tablet One tablet every Wed  w/ 3 mg tab (4mg) OR as directed by physician    warfarin (COUMADIN) 3 mg tablet Take 1 tablet (3 mg total) by mouth once for 1 dose Take as directed    Wound Dressings (TENDERWRAP UNNA BOOT BANDAGE) MISC Apply 4 each topically every 3 (three) days for 30 days     No current facility-administered medications on file prior to visit  She is allergic to azithromycin; cephalexin; latex; and mupirocin       Review of Systems   Constitutional: Negative for activity change, appetite change, diaphoresis, fatigue and fever  HENT: Negative  Eyes: Negative  Respiratory: Negative for apnea, cough, chest tightness, shortness of breath and wheezing  Cardiovascular: Negative for chest pain, palpitations and leg swelling  Gastrointestinal: Negative for abdominal distention, abdominal pain, anal bleeding, constipation, diarrhea, nausea and vomiting     Endocrine: Negative for cold intolerance, heat intolerance, polydipsia, polyphagia and polyuria  Genitourinary: Negative for difficulty urinating, dysuria, flank pain, hematuria and urgency  Musculoskeletal: Negative for arthralgias, back pain, gait problem, joint swelling and myalgias  Skin: Positive for rash  Negative for color change and wound  Chronic for atrophic dermatitis of both lower extremities   Allergic/Immunologic: Negative for environmental allergies, food allergies and immunocompromised state  Neurological: Negative for dizziness, seizures, syncope, speech difficulty, numbness and headaches  Hematological: Negative for adenopathy  Does not bruise/bleed easily  Psychiatric/Behavioral: Negative for agitation, behavioral problems, hallucinations, sleep disturbance and suicidal ideas  Objective:      /70 (BP Location: Left arm, Patient Position: Sitting, Cuff Size: Standard)   Ht 5' 5" (1 651 m)   Wt 56 7 kg (125 lb)   BMI 20 80 kg/m²          Physical Exam   Constitutional: She is oriented to person, place, and time  She appears well-developed and well-nourished  No distress  HENT:   Head: Normocephalic  Right Ear: External ear normal    Left Ear: External ear normal    Nose: Nose normal    Mouth/Throat: Oropharynx is clear and moist    Eyes: Pupils are equal, round, and reactive to light  Conjunctivae and EOM are normal  Right eye exhibits no discharge  Left eye exhibits no discharge  No scleral icterus  Neck: Normal range of motion  No tracheal deviation present  No thyromegaly present  Cardiovascular: Normal rate, regular rhythm and normal heart sounds  Exam reveals no gallop and no friction rub  No murmur heard  Pulmonary/Chest: Effort normal and breath sounds normal  No respiratory distress  She has no wheezes  Abdominal: Soft  Bowel sounds are normal  She exhibits no mass  There is no tenderness  There is no guarding     Musculoskeletal: She exhibits no edema or deformity  Lymphadenopathy:     She has no cervical adenopathy  Neurological: She is alert and oriented to person, place, and time  No cranial nerve deficit  Skin: Skin is warm and dry  Rash noted  She is not diaphoretic  No erythema  Psychiatric: She has a normal mood and affect   Thought content normal

## 2019-03-13 NOTE — PROGRESS NOTES
Subjective:        Patient ID: Kellie Doty is a 80 y o  female  Chief Complaint:  Alycia Ng is here for routine follow-up, despite her hardness of hearing she could hear me as could her daughter today  Alycia Ng denies any chest pains, shortness of breath, palpitations, presyncope, syncope, TIA or claudication like symptoms  No falls or bleeding  She presents in a wheelchair  She is in no distress  Blood pressure up but I am told she does not like to take her blood pressure medications when she goes out, so she did skip all of her meds today likely explaining such  She is headed home now and will take them on arrival     Auscultation does not prove any significant mitral regurgitant murmur, she examines euvolemic, legs wrapped for lower extremity edema is markedly improved from previous years  Pulse irregular but not tachycardic  The following portions of the patient's history were reviewed and updated as appropriate: allergies, current medications, past family history, past medical history, past social history, past surgical history and problem list   Review of Systems   Constitution: Negative for chills, diaphoresis, malaise/fatigue and weight gain  HENT: Negative for nosebleeds and stridor  Eyes: Negative for double vision, vision loss in left eye, vision loss in right eye and visual disturbance  Cardiovascular: Negative for chest pain, claudication, cyanosis, dyspnea on exertion, irregular heartbeat, leg swelling, near-syncope, orthopnea, palpitations, paroxysmal nocturnal dyspnea and syncope  Respiratory: Negative for cough, shortness of breath, snoring and wheezing  Endocrine: Negative for polydipsia, polyphagia and polyuria  Hematologic/Lymphatic: Negative for bleeding problem  Does not bruise/bleed easily  Skin: Negative for flushing and rash  Musculoskeletal: Positive for arthritis and stiffness  Negative for falls and myalgias     Gastrointestinal: Negative for abdominal pain, heartburn, hematemesis, hematochezia, melena and nausea  Genitourinary: Negative for hematuria  Neurological: Negative for brief paralysis, dizziness, focal weakness, headaches, light-headedness, loss of balance and vertigo  Psychiatric/Behavioral: Negative for altered mental status and substance abuse  Allergic/Immunologic: Negative for hives  Objective:      BP (!) 180/80   Pulse 72   Ht 5' 3" (1 6 m)   Wt 56 7 kg (125 lb)   BMI 22 14 kg/m²   Physical Exam   Constitutional: She is oriented to person, place, and time  She appears well-developed and well-nourished  HENT:   Head: Normocephalic and atraumatic  Hard of hearing   Eyes: Pupils are equal, round, and reactive to light  EOM are normal    Neck: Normal range of motion  Neck supple  No JVD present  No thyromegaly present  Cardiovascular: Normal rate, normal heart sounds and intact distal pulses  Exam reveals no gallop and no friction rub  No murmur heard  Pulmonary/Chest: Effort normal and breath sounds normal  No stridor  No respiratory distress  She has no wheezes  She has no rales  Abdominal: Soft  Bowel sounds are normal  She exhibits no mass  There is no tenderness  Musculoskeletal: Normal range of motion  She exhibits no edema  Lymphadenopathy:     She has no cervical adenopathy  Neurological: She is alert and oriented to person, place, and time  Skin: Skin is warm and dry  No rash noted  No pallor  Psychiatric: She has a normal mood and affect  Her behavior is normal  Judgment and thought content normal    Nursing note and vitals reviewed  Lab Review:   Ancillary Orders on 02/01/2019   Component Date Value    Protime 02/26/2019 24 6*    INR 02/26/2019 2 21*     No results found  Assessment:       1  Permanent atrial fibrillation ([address] )     2  Essential hypertension     3   Non-rheumatic mitral regurgitation          Plan:       Her AFib is rate controlled and anticoagulated, blood pressure under excellent control, there is no audible or clinical evidence for progressive mitral insufficiency  She has no signs or symptoms reminiscent of angina nor heart failure  Meds reviewed, ideal, advised no changes today  I recommended no surveillance cardiac testing today  We briefly discussed the role of surveillance echocardiography though no change in findings today would change any current therapy  I told them to give me a call should she develop any concerning heart symptoms such as chest pains swelling shortness of breath palpitations  At that we will order further testing for now will follow her clinically  Return office 6 months, sooner gala Mckay

## 2019-03-13 NOTE — PATIENT INSTRUCTIONS

## 2019-08-14 NOTE — PROGRESS NOTES
Assessment and Plan:     Problem List Items Addressed This Visit     None         History of Present Illness:     Patient presents for Welcome to Medicare visit  Patient Care Team:  Genoveva Evans DO as PCP - General     Review of Systems:     Review of Systems   Constitutional: Positive for activity change and fatigue  Negative for appetite change, diaphoresis and fever  HENT: Positive for hearing loss  Eyes: Negative  Respiratory: Negative for apnea, cough, chest tightness, shortness of breath and wheezing  Cardiovascular: Negative for chest pain, palpitations and leg swelling  Gastrointestinal: Positive for constipation  Negative for abdominal distention, abdominal pain, anal bleeding, bowel incontinence, diarrhea, nausea and vomiting  Endocrine: Negative for cold intolerance, heat intolerance, polydipsia, polyphagia and polyuria  Genitourinary: Negative for difficulty urinating, dysuria, flank pain, hematuria and urgency  Musculoskeletal: Positive for arthralgias, back pain, gait problem and joint swelling  Negative for myalgias  Skin: Positive for rash and wound  Negative for color change  Allergic/Immunologic: Negative for environmental allergies, food allergies and immunocompromised state  Neurological: Negative for dizziness, seizures, syncope, speech difficulty, numbness and headaches  Hematological: Negative for adenopathy  Does not bruise/bleed easily  Psychiatric/Behavioral: Negative for agitation, behavioral problems, hallucinations, sleep disturbance and suicidal ideas  The patient is not nervous/anxious           Problem List:     Patient Active Problem List   Diagnosis    Ambulatory dysfunction    Anxiety    Atrial fibrillation (HCC)    Chronic venous stasis dermatitis of both lower extremities    Closed fracture of lumbar vertebra (HCC)    Hypertension    Primary osteoarthritis of both knees    Congest[facility] heart failure ([facility]F) ([facility] [address] )    Primary cardiomyopathy ([place] [address] )    Mitral regurgitation    Renal insufficiency    Bradycardia      Past Medical and Surgical History:     Past Medical History:   Diagnosis Date    Allergic drug reaction     Cellulitis, leg     Chronic atrial fibrillation (HCC)     Dilated cardiomyopathy ([place] [address] )     Essential (primary) hypertension     Heart failure, unspecified ([place] [address] )     History of echocardiogram 10/28/2015    EF 0 55 (55%), Normal LVSF  Mild to mod MR 2+  Left atrial enlargement   History of echocardiogram 01/03/2018    2-D w/ CFD; EF 0 55 (55%), Normal left vent systolic function  Mod mitral regurg  Left atrial enlargement      History of echocardiogram 06/14/2017    2-D w/CFD    Long term (current) use of anticoagulants     Lymphedema, not elsewhere classified     Mitral regurgitation     Pancreatitis     Rheumatic mitral insufficiency     Rheumatoid arthritis (HCC)     Stasis dermatitis      Past Surgical History:   Procedure Laterality Date    CHOLECYSTECTOMY      followed by procedure to remove gallstones in a pouch outside of gallbladder    TOOTH EXTRACTION        Family History:     Family History   Problem Relation Age of Onset    Coronary artery disease Mother     Prostate cancer Father     Diabetes Sister     No Known Problems Brother     No Known Problems Maternal Grandmother     No Known Problems Maternal Grandfather     No Known Problems Paternal Grandmother     No Known Problems Paternal Grandfather       Social History:     Social History     Tobacco Use   Smoking Status Never Smoker   Smokeless Tobacco Never Used     Social History     Substance and Sexual Activity   Alcohol Use No     Social History     Substance and Sexual Activity   Drug Use No      Medications and Allergies:     Current Outpatient Medications   Medication Sig Dispense Refill    diltiazem (CARDIZEM CD) 120 mg 24 hr capsule Take 1 capsule (120 mg total) by mouth daily 90 capsule 1    enalapril (VASOTEC) 10 mg tablet Take 1 tablet (10 mg total) by mouth 2 (two) times a day 180 tablet 1    furosemide (LASIX) 40 mg tablet Take 1 tablet (40 mg total) by mouth daily 90 tablet 1    magnesium chloride (MAG64) 64 MG TBEC EC tablet Take 64 mg by mouth daily      metoprolol tartrate (LOPRESSOR) 25 mg tablet Take 1 tablet (25 mg total) by mouth daily 30 tablet 5    Multiple Vitamins-Minerals (PRESERVISION AREDS 2 PO) Take 1 capsule by mouth 2 (two) times a day      oxyCODONE-acetaminophen (PERCOCET) 5-325 mg per tablet Take 1 tablet by mouth every 12 (twelve) hours as needed for moderate pain or severe painMax Daily Amount: 2 tablets 60 tablet 0    potassium chloride (KLOR-CON M20) 20 mEq tablet Take 1 tablet (20 mEq total) by mouth daily Take one extra tablet on Mon & Thurs to equal 40mg those days 115 tablet 1    triamcinolone (KENALOG) 0 1 % cream Apply topically 2 (two) times a day 454 g 1    warfarin (COUMADIN) 1 mg tablet One tablet every Wed  w/ 3 mg tab (4mg) OR as directed by physician 15 tablet 1    warfarin (COUMADIN) 3 mg tablet Take 1 tablet (3 mg total) by mouth once for 1 dose Take as directed 30 tablet 5    Wound Dressings (TENDERWRAP UNNA BOOT BANDAGE) MISC Apply 4 each topically every 3 (three) days for 30 days 6 each 5     No current facility-administered medications for this visit  Allergies   Allergen Reactions    Azithromycin Rash    Cephalexin Itching and Rash     Reaction Date: 29Feb2012;     Latex Rash    Mupirocin Rash      Immunizations:     Immunization History   Administered Date(s) Administered    INFLUENZA 11/18/2004, 11/15/2005, 09/21/2016    Pneumococcal Polysaccharide PPV23 07/14/1930    Zoster 12/13/2014      Medicare Screening Tests and Risk Assessments:     Toyin Shell is here for her Subsequent Wellness visit  Last Medicare Wellness visit information reviewed, patient interviewed and updates made to the record today  Health Risk Assessment:  Patient rates overall health as good   Patient feels that their physical health rating is Same  Eyesight was rated as Slightly worse  Hearing was rated as Same  Patient feels that their emotional and mental health rating is Same  Pain experienced by patient in the last 7 days has been None  Patient states that she has experienced no weight loss or gain in last 6 months  Emotional/Mental Health:  Patient has not been feeling nervous/anxious  PHQ-9 Depression Screening:    Frequency of the following problems over the past two weeks:      1  Little interest or pleasure in doing things: 0 - not at all      2  Feeling down, depressed, or hopeless: 0 - not at all  PHQ-2 Score: 0          Broken Bones/Falls: Fall Risk Assessment:    In the past year, patient has experienced: No history of falling in past year          Bladder/Bowel:  Patient has not leaked urine accidently in the last six months  Patient reports no loss of bowel control  Immunizations:  Patient has not had a flu vaccination within the last year  Patient has received a pneumonia shot  Patient has received a shingles shot  Home Safety:  Patient has trouble with stairs inside or outside of their home  Patient currently reports that there are no safety hazards present in home, working smoke alarms, (Additional Comments: Daughter does not know if there are CO2 monitors - [employer] would take care of that if they are in the home)    Preventative Screenings:   No breast cancer screening performed, no colon cancer screen completed, no cholesterol screen completed, no glaucoma eye exam completed(Additional Comments: Pt refuses any preventative Tx)    Nutrition:  Current diet: Regular and Limited junk food with servings of the following:    Medications:  Patient is currently taking over-the-counter supplements  List of OTC medications includes: Gaby S[address] 64  Patient is not able to manage medications  Lifestyle Choices:  Patient reports no tobacco use    Patient has not smoked or used tobacco in the past   Patient reports no alcohol use  Patient does not drive a vehicle  Patient wears seat belt  Current level of exercise of physical activity described by patient as: Very limited activity - walks around home shuffling more than walking due to problems with knees  Activities of Daily Living:  Can get out of bed by his or her self, able to dress self, unable to make own meals, unable to do own shopping, able to bathe self, unable to do laundry/housekeeping, unable to manage own money and other related tasksAdditional Comments: Pt is able to get herself out of cecilio & able to bath & dress herself with minimal assistance    Previous Hospitalizations:  No hospitalization or ED visit in past 12 months        Advanced Directives:  Patient has decided on a power of   Patient has spoken to designated power of   Patient has completed advanced directive  Preventative Screening/Counseling:      Cardiovascular:      General: Risks and Benefits Discussed and Screening Current          Diabetes:      General: Risks and Benefits Discussed and Screening Current          Colorectal Cancer:      General: Screening Not Indicated          Breast Cancer:      General: Screening Not Indicated          Cervical Cancer:      General: Screening Not Indicated          Osteoporosis:      General: Screening Not Indicated          AAA:      General: Screening Not Indicated          Glaucoma:      General: Risks and Benefits Discussed and Screening Current          HIV:      General: Screening Not Indicated          Hepatitis C:      General: Risks and Benefits Discussed and Screening Not Indicated        Advanced Directives:   Patient has living will for healthcare, has durable POA for healthcare, patient has an advanced directive  Information on ACP and/or AD not provided  No 5 wishes given  No end of life assessment reviewed with patient   Provider does not agree with end of life deisions  Immunizations:      Influenza: Risks & Benefits Discussed and Influenza UTD This Year      Pneumococcal: Risks & Benefits Discussed and Lifetime Vaccine Completed      Shingrix: Risks & Benefits Discussed and Patient Declines      Zostavax: Risks & Benefits Discussed and Patient Declines      TDAP: Risks & Benefits Discussed and Patient Declines          No exam data present     Physical Exam:     /84 (BP Location: Left arm, Patient Position: Sitting, Cuff Size: Standard)   Ht 5' 3" (1 6 m)   Wt 56 7 kg (125 lb)   BMI 22 14 kg/m²     Physical Exam   Constitutional: She is oriented to person, place, and time  She appears well-developed and well-nourished  No distress  HENT:   Head: Normocephalic  Right Ear: External ear normal    Left Ear: External ear normal    Nose: Nose normal    Mouth/Throat: Oropharynx is clear and moist    Eyes: Pupils are equal, round, and reactive to light  Conjunctivae and EOM are normal  Right eye exhibits no discharge  Left eye exhibits no discharge  No scleral icterus  Neck: Normal range of motion  No tracheal deviation present  No thyromegaly present  Cardiovascular: Normal rate, regular rhythm and normal heart sounds  Exam reveals no gallop and no friction rub  No murmur heard  Pulmonary/Chest: Effort normal and breath sounds normal  No respiratory distress  She has no wheezes  Abdominal: Soft  Bowel sounds are normal  She exhibits no mass  There is no tenderness  There is no guarding  Musculoskeletal: She exhibits no edema or deformity  Lymphadenopathy:     She has no cervical adenopathy  Neurological: She is alert and oriented to person, place, and time  No cranial nerve deficit  Skin: Skin is warm and dry  Rash noted  She is not diaphoretic  No erythema  Skin rash   Psychiatric: She has a normal mood and affect   Thought content normal

## 2019-08-14 NOTE — PATIENT INSTRUCTIONS
Obesity   AMBULATORY CARE:   Obesity  is when your body mass index (BMI) is greater than 30  Your healthcare provider will use your height and weight to measure your BMI  The risks of obesity include  many health problems, such as injuries or physical disability  You may need tests to check for the following:  · Diabetes     · High blood pressure or high cholesterol     · Heart disease     · Gallbladder or liver disease     · Cancer of the colon, breast, prostate, liver, or kidney     · Sleep apnea     · Arthritis or gout  Seek care immediately if:   · You have a severe headache, confusion, or difficulty speaking  · You have weakness on one side of your body  · You have chest pain, sweating, or shortness of breath  Contact your healthcare provider if:   · You have symptoms of gallbladder or liver disease, such as pain in your upper abdomen  · You have knee or hip pain and discomfort while walking  · You have symptoms of diabetes, such as intense hunger and thirst, and frequent urination  · You have symptoms of sleep apnea, such as snoring or daytime sleepiness  · You have questions or concerns about your condition or care  Treatment for obesity  focuses on helping you lose weight to improve your health  Even a small decrease in BMI can reduce the risk for many health problems  Your healthcare provider will help you set a weight-loss goal   · Lifestyle changes  are the first step in treating obesity  These include making healthy food choices and getting regular physical activity  Your healthcare provider may suggest a weight-loss program that involves coaching, education, and therapy  · Medicine  may help you lose weight when it is used with a healthy diet and physical activity  · Surgery  can help you lose weight if you are very obese and have other health problems  There are several types of weight-loss surgery  Ask your healthcare provider for more information    Be successful losing weight:   · Set small, realistic goals  An example of a small goal is to walk for 20 minutes 5 days a week  Anther goal is to lose 5% of your body weight  · Tell friends, family members, and coworkers about your goals  and ask for their support  Ask a friend to lose weight with you, or join a weight-loss support group  · Identify foods or triggers that may cause you to overeat , and find ways to avoid them  Remove tempting high-calorie foods from your home and workplace  Place a bowl of fresh fruit on your kitchen counter  If stress causes you to eat, then find other ways to cope with stress  · Keep a diary to track what you eat and drink  Also write down how many minutes of physical activity you do each day  Weigh yourself once a week and record it in your diary  Eating changes: You will need to eat 500 to 1,000 fewer calories each day than you currently eat to lose 1 to 2 pounds a week  The following changes will help you cut calories:  · Eat smaller portions  Use small plates, no larger than 9 inches in diameter  Fill your plate half full of fruits and vegetables  Measure your food using measuring cups until you know what a serving size looks like  · Eat 3 meals and 1 or 2 snacks each day  Plan your meals in advance  Y[employer] L[employer] and eat at home most of the time  Eat slowly  · Eat fruits and vegetables at every meal   They are low in calories and high in fiber, which makes you feel full  Do not add butter, margarine, or cream sauce to vegetables  Use herbs to season steamed vegetables  · Eat less fat and fewer fried foods  Eat more baked or grilled chicken and fish  These protein sources are lower in calories and fat than red meat  Limit fast food  Dress your salads with olive oil and vinegar instead of bottled dressing  · Limit the amount of sugar you eat  Do not drink sugary beverages  Limit alcohol  Activity changes:  Physical activity is good for your body in many ways   It helps you burn calories and build strong muscles  It decreases stress and depression, and improves your mood  It can also help you sleep better  Talk to your healthcare provider before you begin an exercise program   · Exercise for at least 30 minutes 5 days a week  Start slowly  Set aside time each day for physical activity that you enjoy and that is convenient for you  It is best to do both weight training and an activity that increases your heart rate, such as walking, bicycling, or swimming  · Find ways to be more active  Do yard work and housecleaning  Walk up the stairs instead of using elevators  Spend your leisure time going to events that require walking, such as outdoor festivals or fairs  This extra physical activity can help you lose weight and keep it off  Follow up with your healthcare provider as directed: You may need to meet with a dietitian  Write down your questions so you remember to ask them during your visits  © 2017 [employer] Information is for End User's use only and may not be sold, redistributed or otherwise used for commercial purposes  All illustrations and images included in CareNotes® are the copyrighted property of [employer]  or Karson Cortez  The above information is an  only  It is not intended as medical advice for individual conditions or treatments  Talk to your doctor, nurse or pharmacist before following any medical regimen to see if it is safe and effective for you  Urinary Incontinence   WHAT YOU NEED TO KNOW:   What is urinary incontinence? Urinary incontinence (UI) is when you lose control of your bladder  What causes UI? UI occurs because your bladder cannot store or empty urine properly  The following are the most common types of UI:  · Stress incontinence  is when you leak urine due to increased bladder pressure  This may happen when you cough, sneeze, or exercise       · Urge incontinence  is when you feel the need to urinate right away and leak urine accidentally  · Mixed incontinence  is when you have both stress and urge UI  What are the signs and symptoms of UI?   · You feel like your bladder does not empty completely when you urinate  · You urinate often and need to urinate immediately  · You leak urine when you sleep, or you wake up with the urge to urinate  · You leak urine when you cough, sneeze, exercise, or laugh  How is UI diagnosed? Your healthcare provider will ask how often you leak urine and whether you have stress or urge symptoms  Tell him which medicines you take, how often you urinate, and how much liquid you drink each day  You may need any of the following tests:  · Urine tests  may show infection or kidney function  · A pelvic exam  may be done to check for blockages  A pelvic exam will also show if your bladder, uterus, or other organs have moved out of place  · An x-ray, ultrasound, or CT  may show problems with parts of your urinary system  You may be given contrast liquid to help your organs show up better in the pictures  Tell the healthcare provider if you have ever had an allergic reaction to contrast liquid  Do not enter the MRI room with anything metal  Metal can cause serious injury  Tell the healthcare provider if you have any metal in or on your body  · A bladder scan  will show how much urine is left in your bladder after you urinate  You will be asked to urinate and then healthcare providers will use a small ultrasound machine to check the urine left in your bladder  · Cystometry  is used to check the function of your urinary system  Your healthcare provider checks the pressure in your bladder while filling it with fluid  Your bladder pressure may also be tested when your bladder is full and while you urinate  How is UI treated? · Medicines  can help strengthen your bladder control      · Electrical stimulation  is used to send a small amount of electrical energy to your pelvic floor muscles  This helps control your bladder function  Electrodes may be placed outside your body or in your rectum  For women, the electrodes may be placed in the vagina  · A bulking agent  may be injected into the wall of your urethra to make it thicker  This helps keep your urethra closed and decreases urine leakage  · Devices  such as a clamp, pessary, or tampon may help stop urine leaks  Ask your healthcare provider for more information about these and other devices  · Surgery  may be needed if other treatments do not work  Several types of surgery can help improve your bladder control  Ask your healthcare provider for more information about the surgery you may need  How can I manage my symptoms? · Do pelvic muscle exercises often  Your pelvic muscles help you stop urinating  Squeeze these muscles tight for 5 seconds, then relax for 5 seconds  Gradually work up to squeezing for 10 seconds  Do 3 sets of 15 repetitions a day, or as directed  This will help strengthen your pelvic muscles and improve bladder control  · A catheter  may be used to help empty your bladder  A catheter is a tiny, plastic tube that is put into your bladder to drain your urine  Your healthcare provider may tell you to use a catheter to prevent your bladder from getting too full and leaking urine  · Keep a UI record  Write down how often you leak urine and how much you leak  Make a note of what you were doing when you leaked urine  · Train your bladder  Go to the bathroom at set times, such as every 2 hours, even if you do not feel the urge to go  You can also try to hold your urine when you feel the urge to go  For example, hold your urine for 5 minutes when you feel the urge to go  As that becomes easier, hold your urine for 10 minutes  · Drink liquids as directed  Ask your healthcare provider how much liquid to drink each day and which liquids are best for you   You may need to limit the amount of liquid you drink to help control your urine leakage  Limit or do not have drinks that contain caffeine or alcohol  Do not drink any liquid right before you go to bed  · Prevent constipation  Eat a variety of high-fiber foods  Good examples are high-fiber cereals, beans, vegetables, and whole-grain breads  Prune juice may help make your bowel movement softer  Walking is the best way to trigger your intestines to have a bowel movement  · Exercise regularly and maintain a healthy weight  Ask your healthcare provider how much you should weigh and about the best exercise plan for you  Weight loss and exercise will decrease pressure on your bladder and help you control your leakage  Ask him to help you create a weight loss plan if you are overweight  When should I seek immediate care? · You have severe pain  · You are confused or cannot think clearly  When should I contact my healthcare provider? · You have a fever  · You see blood in your urine  · You have pain when you urinate  · You have new or worse pain, even after treatment  · Your mouth feels dry or you have vision changes  · Your urine is cloudy or smells bad  · You have questions or concerns about your condition or care  CARE AGREEMENT:   You have the right to help plan your care  Learn about your health condition and how it may be treated  Discuss treatment options with your caregivers to decide what care you want to receive  You always have the right to refuse treatment  The above information is an  only  It is not intended as medical advice for individual conditions or treatments  Talk to your doctor, nurse or pharmacist before following any medical regimen to see if it is safe and effective for you  © 2017 [employer] Information is for End User's use only and may not be sold, redistributed or otherwise used for commercial purposes   All illustrations and images included in CareNotes® are the copyrighted property of [employer]  or Karson Cortez  Cigarette Smoking and Your Health   AMBULATORY CARE:   Risks to your health if you smoke:  Nicotine and other chemicals found in tobacco damage every cell in your body  Even if you are a light smoker, you have an increased risk for cancer, heart disease, and lung disease  If you are pregnant or have diabetes, smoking increases your risk for complications  Benefits to your health if you stop smoking:   · You decrease respiratory symptoms such as coughing, wheezing, and shortness of breath  · You reduce your risk for cancers of the lung, mouth, throat, kidney, bladder, pancreas, stomach, and cervix  If you already have cancer, you increase the benefits of chemotherapy  You also reduce your risk for cancer returning or a second cancer from developing  · You reduce your risk for heart disease, blood clots, heart attack, and stroke  · You reduce your risk for lung infections, and diseases such as pneumonia, asthma, chronic bronchitis, and emphysema  · Your circulation improves  More oxygen can be delivered to your body  If you have diabetes, you lower your risk for complications, such as kidney, artery, and eye diseases  You also lower your risk for nerve damage  Nerve damage can lead to amputations, poor vision, and blindness  · You improve your body's ability to heal and to fight infections  Benefits to the health of others if you stop smoking:  Tobacco is harmful to nonsmokers who breathe in your secondhand smoke  The following are ways the health of others around you may improve when you stop smoking:  · You lower the risks for lung cancer and heart disease in nonsmoking adults  · If you are pregnant, you lower the risk for miscarriage, early delivery, low birth weight, and stillbirth  You also lower your baby's risk for SIDS, obesity, developmental delay, and neurobehavioral problems, such as ADHD  · If you have children, you lower their risk for ear infections, colds, pneumonia, bronchitis, and asthma  For more information and support to stop smoking:   · Smokefree  gov  Phone: 2- 239 - 460-2083  Web Address: www smokefree  gov  Follow up with your healthcare provider as directed:  Write down your questions so you remember to ask them during your visits  © 2017 [employer] Information is for End User's use only and may not be sold, redistributed or otherwise used for commercial purposes  All illustrations and images included in CareNotes® are the copyrighted property of [employer]  or Karson Cortez  The above information is an  only  It is not intended as medical advice for individual conditions or treatments  Talk to your doctor, nurse or pharmacist before following any medical regimen to see if it is safe and effective for you  Fall Prevention   AMBULATORY CARE:   Fall prevention  includes ways to make your home and other areas safer  It also includes ways you can move more carefully to prevent a fall  Health conditions that cause changes in your blood pressure, vision, or muscle strength and coordination may increase your risk for falls  Medicines may also increase your risk for falls if they make you dizzy, weak, or sleepy  Call 911 or have someone else call if:   · You have fallen and are unconscious  · You have fallen and cannot move part of your body  Contact your healthcare provider if:   · You have fallen and have pain or a headache  · You have questions or concerns about your condition or care  Fall prevention tips:   · Stand or sit up slowly  This may help you keep your balance and prevent falls  · Use assistive devices as directed  Your healthcare provider may suggest that you use a cane or walker to help you keep your balance  You may need to have grab bars put in your bathroom near the toilet or in the shower      · Wear shoes that fit well and have soles that   Wear shoes both inside and outside  Use slippers with good   Do not wear shoes with high heels  · Wear a personal alarm  This is a device that allows you to call 911 if you fall and need help  Ask your healthcare provider for more information  · Stay active  Exercise can help strengthen your muscles and improve your balance  Your healthcare provider may recommend water aerobics or walking  He or she may also recommend physical therapy to improve your coordination  Never start an exercise program without talking to your healthcare provider first      · Manage your medical conditions  Keep all appointments with your healthcare providers  Visit your eye doctor as directed  Home safety tips:   · Add items to prevent falls in the bathroom  Put nonslip strips on your bath or shower floor to prevent you from slipping  Use a bath mat if you do not have carpet in the bathroom  This will prevent you from falling when you step out of the bath or shower  Use a shower seat so you do not need to stand while you shower  Sit on the toilet or a chair in your bathroom to dry yourself and put on clothing  This will prevent you from losing your balance from drying or dressing yourself while you are standing  · Keep paths clear  Remove books, shoes, and other objects from walkways and stairs  Place cords for telephones and lamps out of the way so that you do not need to walk over them  Tape them down if you cannot move them  Remove small rugs  If you cannot remove a rug, secure it with double-sided tape  This will prevent you from tripping  · Install bright lights in your home  Use night lights to help light paths to the bathroom or kitchen  Always turn on the light before you start walking  · Keep items you use often on shelves within reach  Do not use a step stool to help you reach an item  · Paint or place reflective tape on the edges of your stairs    This will help you see the stairs better  Follow up with your healthcare provider as directed:  Write down your questions so you remember to ask them during your visits  © 2017 [employer] Information is for End User's use only and may not be sold, redistributed or otherwise used for commercial purposes  All illustrations and images included in CareNotes® are the copyrighted property of [employer]  or Karson Cortez  The above information is an  only  It is not intended as medical advice for individual conditions or treatments  Talk to your doctor, nurse or pharmacist before following any medical regimen to see if it is safe and effective for you  Advance Directives   WHAT YOU NEED TO KNOW:   What are advance directives? Advance directives are legal documents that state your wishes and plans for medical care  These plans are made ahead of time in case you lose your ability to make decisions for yourself  Advance directives can apply to any medical decision, such as the treatments you want, and if you want to donate organs  What are the types of advance directives? There are many types of advance directives, and each state has rules about how to use them  You may choose a combination of any of the following:  · Living will: This is a written record of the treatment you want  You can also choose which treatments you do not want, which to limit, and which to stop at a certain time  This includes surgery, medicine, IV fluid, and tube feedings  · Durable power of  for healthcare [place] [facility]): This is a written record that states who you want to make healthcare choices for you when you are unable to make them for yourself  This person, called a proxy, is usually a family member or a friend  You may choose more than 1 proxy  · Do not resuscitate (DNR) order:  A DNR order is used in case your heart stops beating or you stop breathing   It is a request not to have certain forms of treatment, such as CPR  A DNR order may be included in other types of advance directives  · Medical directive: This covers the care that you want if you are in a coma, near death, or unable to make decisions for yourself  You can list the treatments you want for each condition  Treatment may include pain medicine, surgery, blood transfusions, dialysis, IV or tube feedings, and a ventilator (breathing machine)  · Values history: This document has questions about your views, beliefs, and how you feel and think about life  This information can help others choose the care that you would choose  Why are advance directives important? An advance directive helps you control your care  Although spoken wishes may be used, it is better to have your wishes written down  Spoken wishes can be misunderstood, or not followed  Treatments may be given even if you do not want them  An advance directive may make it easier for your family to make difficult choices about your care  How do I decide what to put in my advance directives? · Make informed decisions:  Make sure you fully understand treatments or care you may receive  Think about the benefits and problems your decisions could cause for you or your family  Talk to healthcare providers if you have concerns or questions before you write down your wishes  You may also want to talk with your [employer] or , or a   Check your state laws to make sure that what you put in your advance directive is legal      · Sign all forms:  Sign and date your advance directive when you have finished  You may also need 2 witnesses to sign the forms  Witnesses cannot be your doctor or his staff, your spouse, heirs or beneficiaries, people you owe money to, or your chosen proxy  Talk to your family, proxy, and healthcare providers about your advance directive  Give each person a copy, and keep one for yourself in a place you can get to easily   Do not keep it hidden or locked away  · Review and revise your plans: You can revise your advance directive at any time, as long as you are able to make decisions  Review your plan every year, and when there are changes in your life, or your health  When you make changes, let your family, proxy, and healthcare providers know  Give each a new copy  Where can I find more information? · [employer]  [address] [place] , [place] [address]  Phone: 9- 102 - [phone]  Phone: 2- 941 - [phone]  Web Address: http://www  aafp org  · [address] [facility])  [address] [address] [facility], [address] [facility] , [address] [place] [address]  Phone: [phone]  Phone: [phone]  Web Address: Deann aponte  CARE AGREEMENT:   You have the right to help plan your care  To help with this plan, you must learn about your health condition and treatment options  You must also learn about advance directives and how they are used  Work with your healthcare providers to decide what care will be used to treat you  You always have the right to refuse treatment  The above information is an  only  It is not intended as medical advice for individual conditions or treatments  Talk to your doctor, nurse or pharmacist before following any medical regimen to see if it is safe and effective for you  © 2017 [employer] Information is for End User's use only and may not be sold, redistributed or otherwise used for commercial purposes  All illustrations and images included in CareNotes® are the copyrighted property of [employer]  or Karson Cortez

## 2019-09-23 NOTE — TELEPHONE ENCOUNTER
ELI---Patient C/O UTI symptoms, wants to take urine sample to the lab, I ordered it   Told her we will call once we get the results

## 2019-09-24 NOTE — RESULT ENCOUNTER NOTE
Please call the patient regarding her abnormal result  Please find out of a culture was done if it was not we should empirically start her on some Bactrim double strength for 7 days 1 b i d

## 2019-09-26 NOTE — PROGRESS NOTES
Subjective:        Patient ID: Arvin Gustafson is a 80 y o  female  Chief Complaint:  Laura Santos is here for routine follow-up  She feels well offers no complaints  Daughter feels she has been doing well  No hospitalizations or setbacks  No cardiac complaints related  Denies chest pain shortness of breath or palpitations  She examines euvolemic, her legs as euvolemic as I have seen them in years, Ace wrapped  Normotensive  The following portions of the patient's history were reviewed and updated as appropriate: allergies, current medications, past family history, past medical history, past social history, past surgical history and problem list   Review of Systems   Constitution: Negative for chills, diaphoresis, malaise/fatigue and weight gain  HENT: Positive for hearing loss  Negative for nosebleeds and stridor  Eyes: Negative for double vision, vision loss in left eye, vision loss in right eye and visual disturbance  Cardiovascular: Negative for chest pain, claudication, cyanosis, dyspnea on exertion, irregular heartbeat, leg swelling, near-syncope, orthopnea, palpitations, paroxysmal nocturnal dyspnea and syncope  Respiratory: Negative for cough, shortness of breath, snoring and wheezing  Endocrine: Negative for polydipsia, polyphagia and polyuria  Hematologic/Lymphatic: Negative for bleeding problem  Does not bruise/bleed easily  Skin: Negative for flushing and rash  Musculoskeletal: Positive for arthritis, muscle weakness and stiffness  Negative for falls and myalgias  Gastrointestinal: Negative for abdominal pain, heartburn, hematemesis, hematochezia, melena and nausea  Genitourinary: Negative for hematuria  Neurological: Negative for brief paralysis, dizziness, focal weakness, headaches, light-headedness, loss of balance and vertigo  Psychiatric/Behavioral: Negative for altered mental status and substance abuse  Allergic/Immunologic: Negative for hives  Objective:      /70   Pulse 70   Ht 5' 3" (1 6 m)   Wt 56 7 kg (125 lb)   BMI 22 14 kg/m²   Physical Exam   Constitutional: She is oriented to person, place, and time  She appears well-developed and well-nourished  HENT:   Head: Normocephalic and atraumatic  Eyes: Pupils are equal, round, and reactive to light  EOM are normal    Neck: Normal range of motion  Neck supple  No JVD present  No thyromegaly present  Cardiovascular: Normal rate, normal heart sounds and intact distal pulses  Exam reveals no gallop and no friction rub  No murmur heard  Pulmonary/Chest: Effort normal and breath sounds normal  No stridor  No respiratory distress  She has no wheezes  She has no rales  Abdominal: Soft  Bowel sounds are normal  She exhibits no mass  There is no tenderness  Musculoskeletal: She exhibits no edema  Marked arthritic changes noted   Lymphadenopathy:     She has no cervical adenopathy  Neurological: She is alert and oriented to person, place, and time  Skin: Skin is warm and dry  No rash noted  No pallor  Psychiatric: She has a normal mood and affect  Her behavior is normal    Nursing note and vitals reviewed        Lab Review:   Lab on 09/23/2019   Component Date Value    Urine Culture 09/23/2019 20,000-29,000 cfu/ml     Orders Only on 09/23/2019   Component Date Value    Color, UA 09/23/2019 Yellow     Clarity, UA 09/23/2019 Cloudy     Specific Gravity, UA 09/23/2019 1 016     pH, UA 09/23/2019 7 0     Leukocytes, UA 09/23/2019 Moderate*    Nitrite, UA 09/23/2019 Negative     Protein, UA 09/23/2019 Trace*    Glucose, UA 09/23/2019 Negative     Ketones, UA 09/23/2019 Negative     Urobilinogen, UA 09/23/2019 0 2     Bilirubin, UA 09/23/2019 Negative     Blood, UA 09/23/2019 Negative     RBC, UA 09/23/2019 2-4*    WBC, UA 09/23/2019 2-4*    Epithelial Cells 09/23/2019 None Seen     Bacteria, UA 09/23/2019 None Seen     AMORPH PHOSPATES 09/23/2019 Occasional    Lab on 09/10/2019   Component Date Value    Protime 09/10/2019 27 5*    INR 09/10/2019 2 62*   Orders Only on 08/12/2019   Component Date Value    Protime 08/12/2019 22 2*    INR 08/12/2019 2 00*   Ancillary Orders on 06/21/2019   Component Date Value    Protime 07/15/2019 22 2*    INR 07/15/2019 2 00*   Ancillary Orders on 05/24/2019   Component Date Value    Protime 06/17/2019 27 0*    INR 06/17/2019 2 49*   Ancillary Orders on 04/26/2019   Component Date Value    Protime 05/20/2019 24 5*    INR 05/20/2019 2 20*   Office Visit on 04/24/2019   Component Date Value    Hemoglobin A1C 04/24/2019 5 4     Sodium 08/12/2019 138     Potassium 08/12/2019 3 2*    Chloride 08/12/2019 103     CO2 08/12/2019 26     ANION GAP 08/12/2019 9     BUN 08/12/2019 11     Creatinine 08/12/2019 0 68     Glucose, Fasting 08/12/2019 97     Calcium 08/12/2019 8 6     AST 08/12/2019 16     ALT 08/12/2019 22     Alkaline Phosphatase 08/12/2019 133*    Total Protein 08/12/2019 7 3     Albumin 08/12/2019 3 5     Total Bilirubin 08/12/2019 0 65     eGFR 08/12/2019 78     TSH 3RD GENERATON 08/12/2019 1 890     WBC 08/12/2019 6 23     RBC 08/12/2019 4 19     Hemoglobin 08/12/2019 12 8     Hematocrit 08/12/2019 40 5     MCV 08/12/2019 97     MCH 08/12/2019 30 5     MCHC 08/12/2019 31 6     RDW 08/12/2019 13 3     Platelets 16/78/6982 212     MPV 08/12/2019 12 0    Ancillary Orders on 03/29/2019   Component Date Value    Protime 04/22/2019 23 0*    INR 04/22/2019 2 03*     No results found  Assessment:       1  Non-rheumatic mitral regurgitation  Echo complete with contrast if indicated   2  Atrial fibrillation, unspecified type ([address] )  Echo complete with contrast if indicated   3  Essential hypertension  Echo complete with contrast if indicated        Plan:       Shima Robins is without any signs or symptoms reminiscent of unstable angina, decompensated heart failure nor electrical instability      Her AFib is rate controlled and anticoagulated, goal INR 2 0-3 0 advised  Blood pressure well controlled  Meds reviewed, ideal, I recommended no changes today  I have ordered an echocardiogram at or right before her next follow-up visit in 6 months time  Asked her and her daughter to give me a call should any concerning potential cardiac symptoms arise prior to her visit

## 2019-09-26 NOTE — PATIENT INSTRUCTIONS

## 2019-09-30 NOTE — TELEPHONE ENCOUNTER
Patient had was given a refill on her Percocet she was given 1 month refill the the  web site was accessed there are no red flags

## 2019-10-09 NOTE — RESULT ENCOUNTER NOTE
Please call the patient regarding her abnormal result    INR is low at 1 49 perhaps she missed some doses of Coumadin I would not change the dosage however

## 2019-10-22 PROBLEM — J18.9 PNEUMONIA OF RIGHT LOWER LOBE DUE TO INFECTIOUS ORGANISM: Status: ACTIVE | Noted: 2019-01-01

## 2019-10-22 PROBLEM — R53.1 GENERALIZED WEAKNESS: Status: ACTIVE | Noted: 2019-01-01

## 2019-10-22 PROBLEM — I50.32 CHRONIC DIASTOLIC CONGESTIVE HEART FAILURE (HCC): Status: ACTIVE | Noted: 2018-07-24

## 2019-10-22 PROBLEM — G93.40 ACUTE ENCEPHALOPATHY: Status: ACTIVE | Noted: 2019-01-01

## 2019-10-22 PROBLEM — R77.8 TROPONIN LEVEL ELEVATED: Status: ACTIVE | Noted: 2019-01-01

## 2019-10-22 PROBLEM — N39.0 SEPSIS DUE TO URINARY TRACT INFECTION (HCC): Status: ACTIVE | Noted: 2019-01-01

## 2019-10-22 PROBLEM — A41.9 SEPSIS DUE TO URINARY TRACT INFECTION (HCC): Status: ACTIVE | Noted: 2019-01-01

## 2019-10-22 NOTE — ED PROVIDER NOTES
History  Chief Complaint   Patient presents with    Altered Mental Status     Change in mental staus today  Difficulty walking  Altered Mental Status   Presenting symptoms: confusion, disorientation and lethargy    Severity:  Moderate  Most recent episode:  Yesterday  Episode history:  Continuous  Duration:  1 day  Timing:  Constant  Progression:  Worsening  Chronicity:  New  Associated symptoms: weakness    Associated symptoms: no abdominal pain, no agitation, no fever, no headaches, no light-headedness, no nausea, no rash and no vomiting      Pt presents from home, brought in by her family who take care of the patient, due to increasing confusion, generalized weakness, and "she state she is having pain but won't tell us where "  Pt o/w denies any symptoms  Pt denies ha, fevers, cough, cp, sob, n/v/d/c, abd pain, dysuria, focal def or syncope  Prior to Admission Medications   Prescriptions Last Dose Informant Patient Reported? Taking?    Multiple Vitamins-Minerals (PRESERVISION AREDS 2 PO) 10/22/2019 at Unknown time Child Yes Yes   Sig: Take 1 capsule by mouth 2 (two) times a day   Wound Dressings (UNNA-FLEX ELASTIC UNNA BOOT) MISC 10/22/2019 at Unknown time  No Yes   Sig: Apply 1 each topically once a week   diltiazem (CARDIZEM CD) 120 mg 24 hr capsule 10/22/2019 at Unknown time  No Yes   Sig: Take 1 capsule (120 mg total) by mouth daily   enalapril (VASOTEC) 10 mg tablet 10/22/2019 at Unknown time  No Yes   Sig: Take 1 tablet (10 mg total) by mouth 2 (two) times a day   furosemide (LASIX) 40 mg tablet 10/22/2019 at Unknown time  No Yes   Sig: Take 1 tablet (40 mg total) by mouth daily   magnesium chloride (MAG64) 64 MG TBEC EC tablet 10/22/2019 at Unknown time Child Yes Yes   Sig: Take 64 mg by mouth daily   metoprolol tartrate (LOPRESSOR) 25 mg tablet 10/21/2019 at Unknown time  No Yes   Sig: Take 1 tablet (25 mg total) by mouth daily   oxyCODONE-acetaminophen (PERCOCET) 5-325 mg per tablet 10/22/2019 at Unknown time  No Yes   Sig: Take 1 tablet by mouth every 12 (twelve) hours as needed for moderate pain or severe painMax Daily Amount: 2 tablets   potassium chloride (KLOR-CON M20) 20 mEq tablet 10/22/2019 at Unknown time  No Yes   Sig: Take 1 tablet (20 mEq total) by mouth daily Take one extra tablet on Mon & Thurs to equal 40mg those days   triamcinolone (KENALOG) 0 1 % cream 10/22/2019 at Unknown time  No Yes   Sig: Apply topically 2 (two) times a day   warfarin (COUMADIN) 1 mg tablet 10/16/2019  No No   Sig: One tablet every Wed  w/ 3 mg tab (4mg) OR as directed by physician   Patient taking differently: 1 5 mg On wednesday   warfarin (COUMADIN) 3 mg tablet 10/21/2019 at Unknown time  No Yes   Sig: Take 1 tablet (3 mg total) by mouth once for 1 dose Take as directed   Patient taking differently: Take 3 mg by mouth once Take as directed      Facility-Administered Medications: None       Past Medical History:   Diagnosis Date    Allergic drug reaction     Cellulitis, leg     Chronic atrial fibrillation     [facility]ilated [facility] [address]     Essential (primary) hypertension     Heart failure, unspecified ([facility] [address] )     History of echocardiogram 10/28/2015    EF 0 55 (55%), Normal LVSF  Mild to mod MR 2+  Left atrial enlargement   History of echocardiogram 01/03/2018    2-D w/ CFD; EF 0 55 (55%), Normal left vent systolic function  Mod mitral regurg  Left atrial enlargement      History of echocardiogram 06/14/2017    2-D w/CFD    Long term (current) use of anticoagulants     Lymphedema, not elsewhere classified     Mitral regurgitation     Pancreatitis     Rheumatic mitral insufficiency     Rheumatoid arthritis (HCC)     Stasis dermatitis        Past Surgical History:   Procedure Laterality Date    CHOLECYSTECTOMY      followed by procedure to remove gallstones in a pouch outside of gallbladder    TOOTH EXTRACTION         Family History   Problem Relation Age of Onset    Coronary artery disease Mother     Prostate cancer Father     Diabetes Sister     No Known Problems Brother     No Known Problems Maternal Grandmother     No Known Problems Maternal Grandfather     No Known Problems Paternal Grandmother     No Known Problems Paternal Grandfather      I have reviewed and agree with the history as documented  Social History     Tobacco Use    Smoking status: Never Smoker    Smokeless tobacco: Never Used   Substance Use Topics    Alcohol use: No    Drug use: No        Review of Systems   Constitutional: Negative for activity change, appetite change, diaphoresis, fatigue and fever  HENT: Negative for congestion, facial swelling, mouth sores and trouble swallowing  Eyes: Negative for photophobia, discharge and visual disturbance  Respiratory: Negative for apnea, cough, shortness of breath and wheezing  Cardiovascular: Negative for chest pain and leg swelling  Gastrointestinal: Negative for abdominal pain, constipation, diarrhea, nausea and vomiting  Endocrine: Negative for heat intolerance and polydipsia  Genitourinary: Negative for dysuria, flank pain, frequency and hematuria  Musculoskeletal: Negative for back pain, gait problem, myalgias and neck pain  Skin: Negative for rash and wound  Allergic/Immunologic: Negative for immunocompromised state  Neurological: Positive for weakness  Negative for dizziness, syncope, light-headedness and headaches  Hematological: Negative for adenopathy  Psychiatric/Behavioral: Positive for confusion  Negative for agitation and self-injury  The patient is not nervous/anxious  Physical Exam  Physical Exam   Constitutional: She appears well-developed and well-nourished  No distress  HENT:   Head: Normocephalic and atraumatic  Right Ear: External ear normal    Left Ear: External ear normal    Nose: Nose normal    Mouth/Throat: Oropharynx is clear and moist  No oropharyngeal exudate     Eyes: Pupils are equal, round, and reactive to light  Conjunctivae and EOM are normal  Right eye exhibits no discharge  Left eye exhibits no discharge  No scleral icterus  Neck: Normal range of motion  Neck supple  No JVD present  No tracheal deviation present  No thyromegaly present  Cardiovascular: Normal rate, regular rhythm and normal heart sounds  No murmur heard  Pulmonary/Chest: Effort normal and breath sounds normal  No stridor  No respiratory distress  She has no wheezes  She has no rales  She exhibits no tenderness  Abdominal: Soft  Bowel sounds are normal  She exhibits no distension and no mass  There is no tenderness  There is no rebound and no guarding  Musculoskeletal: Normal range of motion  She exhibits no edema, tenderness or deformity  Lymphadenopathy:     She has no cervical adenopathy  Neurological: She is alert  She has normal reflexes  She displays normal reflexes  No cranial nerve deficit  She exhibits normal muscle tone  Coordination normal    Pt is oriented to self only  She is disoriented to place and time  Skin: Skin is warm and dry  No rash noted  She is not diaphoretic  No erythema  No pallor  Psychiatric: She has a normal mood and affect  Her behavior is normal  Judgment and thought content normal    Nursing note and vitals reviewed        Vital Signs  ED Triage Vitals   Temperature Pulse Respirations Blood Pressure SpO2   10/22/19 1813 10/22/19 1813 10/22/19 1813 10/22/19 1813 10/22/19 1819   97 6 °F (36 4 °C) 72 18 140/88 93 %      Temp Source Heart Rate Source Patient Position - Orthostatic VS BP Location FiO2 (%)   10/22/19 1813 10/22/19 1813 10/22/19 1813 10/22/19 1813 --   Temporal Monitor Sitting Right arm       Pain Score       10/22/19 1813       No Pain           Vitals:    10/22/19 1813 10/22/19 1915 10/22/19 2030   BP: 140/88 116/93    Pulse: 72 92 (!) 130   Patient Position - Orthostatic VS: Sitting           Visual Acuity  Visual Acuity      Most Recent Value   L Pupil Size (mm)  3   R Pupil Size (mm)  3          ED Medications  Medications   sodium chloride (PF) 0 9 % injection 3 mL (has no administration in time range)   sodium chloride 0 9 % bolus 250 mL (250 mL Intravenous New Bag 10/22/19 1944)   levofloxacin (LEVAQUIN) IVPB (premix) 750 mg (750 mg Intravenous New Bag 10/22/19 2117)       Diagnostic Studies  Results Reviewed     Procedure Component Value Units Date/Time    Lactic acid, plasma [[id number]]     Lab Status:  No result Specimen:  Blood     Urine Microscopic [[id number]]  (Abnormal) Collected:  10/22/19 1956    Lab Status:  Final result Specimen:  Urine, Indwelling Olguin Catheter Updated:  10/22/19 2014     RBC, UA None Seen /hpf      WBC, UA 0-1 /hpf      Epithelial Cells None Seen /hpf      Bacteria, UA Moderate /hpf      AMORPH URATES Occasional /hpf     Ammonia [[id number]]  (Abnormal) Collected:  10/22/19 1934    Lab Status:  Final result Specimen:  Blood from Arm, Right Updated:  10/22/19 2007     Ammonia <10 umol/L     UA w Reflex to Microscopic w Reflex to Culture [[id number]]  (Abnormal) Collected:  10/22/19 1956    Lab Status:  Final result Specimen:  Urine, Indwelling Olguin Catheter Updated:  10/22/19 2004     Color, UA Yellow     Clarity, UA Clear     Specific Gravity, UA 1 015     pH, UA 6 5     Leukocytes, UA Negative     Nitrite, UA Negative     Protein, UA 30 (1+) mg/dl      Glucose, UA Negative mg/dl      Ketones, UA Negative mg/dl      Urobilinogen, UA 0 2 E U /dl      Bilirubin, UA Negative     Blood, UA Negative    NT-BNP PRO [[id number]]  (Abnormal) Collected:  10/22/19 1934    Lab Status:  Final result Specimen:  Blood from Arm, Right Updated:  10/22/19 2004     NT-proBNP 30,295 pg/mL     Lipase [[id number]]  (Normal) Collected:  10/22/19 1934    Lab Status:  Final result Specimen:  Blood from Arm, Right Updated:  10/22/19 2004     Lipase 137 u/L     Magnesium [[id number]]  (Normal) Collected:  10/22/19 1934    Lab Status:  Final result Specimen:  Blood from Arm, Right Updated:  10/22/19 2004     Magnesium 1 9 mg/dL     Lactic acid, plasma [[id number]]  (Normal) Collected:  10/1933    Lab Status:  Final result Specimen:  Blood from Arm, Right Updated:  10/22/19 1959     LACTIC ACID 2 0 mmol/L     Narrative:       Result may be elevated if tourniquet was used during collection      Troponin I [[id number]]  (Abnormal) Collected:  10/1933    Lab Status:  Final result Specimen:  Blood from Arm, Right Updated:  10/22/19 1958     Troponin I 0 15 ng/mL     Comprehensive metabolic panel [[id number]]  (Abnormal) Collected:  10/22/19 1934    Lab Status:  Final result Specimen:  Blood from Arm, Right Updated:  10/22/19 1958     Sodium 141 mmol/L      Potassium 3 7 mmol/L      Chloride 104 mmol/L      CO2 27 mmol/L      ANION GAP 10 mmol/L      BUN 21 mg/dL      Creatinine 1 05 mg/dL      Glucose 122 mg/dL      Calcium 8 9 mg/dL      AST 42 U/L      ALT 46 U/L      Alkaline Phosphatase 117 U/L      Total Protein 7 4 g/dL      Albumin 3 2 g/dL      Total Bilirubin 1 50 mg/dL      eGFR 47 ml/min/1 73sq m     Narrative:       [place] guidelines for Chronic Kidney Disease (CKD):     Stage 1 with normal or high GFR (GFR > 90 mL/min/1 73 square meters)    Stage 2 Mild CKD (GFR = 60-89 mL/min/1 73 square meters)    Stage 3A Moderate CKD (GFR = 45-59 mL/min/1 73 square meters)    Stage 3B Moderate CKD (GFR = 30-44 mL/min/1 73 square meters)    Stage 4 Severe CKD (GFR = 15-29 mL/min/1 73 square meters)    Stage 5 End Stage CKD (GFR <15 mL/min/1 73 square meters)  Note: GFR calculation is accurate only with a steady state creatinine    Protime-INR [[id number]]  (Abnormal) Collected:  10/22/19 1934    Lab Status:  Final result Specimen:  Blood from Arm, Right Updated:  10/22/19 1957     Protime 21 9 seconds      INR 1 89    APTT [[id number]]  (Abnormal) Collected:  10/22/19 1934    Lab Status:  Final result Specimen:  Blood from Arm, Right Updated: 10/22/19 1957     PTT 46 seconds     Blood gas, venous [[id number]]  (Abnormal) Collected:  10/1933    Lab Status:  Final result Specimen:  Blood from Arm, Right Updated:  10/22/19 1947     pH, Lalo 7 426     pCO2, Lalo 36 9 mm Hg      pO2, Lalo 33 9 mm Hg      HCO3, Lalo 23 7 mmol/L      Base Excess, Lalo -0 3 mmol/L      O2 Content, Lalo 12 9 ml/dL      O2 HGB, VENOUS 63 7 %     CBC and differential [[id number]]  (Abnormal) Collected:  10/1933    Lab Status:  Final result Specimen:  Blood from Arm, Right Updated:  10/22/19 1942     WBC 23 44 Thousand/uL      RBC 4 39 Million/uL      Hemoglobin 13 4 g/dL      Hematocrit 43 0 %      MCV 98 fL      MCH 30 5 pg      MCHC 31 2 g/dL      RDW 13 8 %      MPV 11 3 fL      Platelets 647 Thousands/uL      nRBC 0 /100 WBCs      Neutrophils Relative 88 %      Immat GRANS % 1 %      Lymphocytes Relative 3 %      Monocytes Relative 8 %      Eosinophils Relative 0 %      Basophils Relative 0 %      Neutrophils Absolute 20 64 Thousands/µL      Immature Grans Absolute 0 18 Thousand/uL      Lymphocytes Absolute 0 63 Thousands/µL      Monocytes Absolute 1 92 Thousand/µL      Eosinophils Absolute 0 00 Thousand/µL      Basophils Absolute 0 07 Thousands/µL     Blood culture [[id number]] Collected:  10/22/19 1934    Lab Status: In process Specimen:  Blood from Arm, Left Updated:  10/22/19 1938    Blood culture [[id number]] Collected:  10/22/19 1934    Lab Status: In process Specimen:  Blood from Arm, Right Updated:  10/22/19 1938             EKG: junctional rhythm, LVH, no acute ischemia    CT head without contrast   Final Result by Jay Jay Pearl MD (10/22 1922)      No acute intracranial abnormality  Workstation performed: [id number]         X-ray chest 2 views    (Results Pending)              Procedures  Procedures       ED Course         9:15 PM - I spoke w/ the family concerning the need to give a large amount of IVF    They understand that the patient has an elevated BNP which would suggest that the patient could have CHF with the amount of IVF  They understand and are asking that we not perform the standard 30cc/kg of IVF bolus  They understand the risks but still wish to not aggressively hydrate the patient at this time  MDM  Number of Diagnoses or Management Options  Generalized weakness:   Diagnosis management comments: IMP: uti versus electrolyte abnormality, dehydration, medication side affect, pneumonia, viral illness  Doubt acs, pe, dissection, tamponade, cva, bacteremia, surgical abd process  Plan: cardiac labs, VBG, ammonia, ekg, cxr, urinalysis, ct head, give ivf prn   - cxr shows RLL infiltrate  - ekg junctional rhythm, LVH, no acute ischemia  - leukocytosis 23  - ct head no acute  - bnp 30,000  - trop 0 15  - urinalysis no acute  - Pt with leukocytosis and likely pneumonia  Will admit to medicine         Amount and/or Complexity of Data Reviewed  Clinical lab tests: ordered and reviewed  Tests in the radiology section of CPT®: ordered and reviewed  Tests in the medicine section of CPT®: ordered and reviewed  Decide to obtain previous medical records or to obtain history from someone other than the patient: yes  Obtain history from someone other than the patient: yes (Pt's children)  Review and summarize past medical records: yes  Discuss the patient with other providers: yes (Hospitalist)  Independent visualization of images, tracings, or specimens: yes    Risk of Complications, Morbidity, and/or Mortality  Presenting problems: high  Diagnostic procedures: high  Management options: high    Patient Progress  Patient progress: stable      Disposition  Final diagnoses:   Generalized weakness     Time reflects when diagnosis was documented in both MDM as applicable and the Disposition within this note     Time User Action Codes Description Comment    10/22/2019  8:56 PM Juan Perez Add [R53 1] Generalized weakness       ED Disposition     ED Disposition Condition Date/Time Comment    Admit Stable [place] Oct 22, 2019  8:55 PM Case was discussed with Dasha and the patient's admission status was agreed to be Admission Status: inpatient status to the service of Dr Freddy Giles  Follow-up Information    None         Patient's Medications   Discharge Prescriptions    No medications on file     No discharge procedures on file      ED Provider  Electronically Signed by           Larry Weinberg DO  10/22/19 2130       Larry Weinberg DO  10/22/19 2133

## 2019-10-23 PROBLEM — N17.9 ACUTE RENAL FAILURE SUPERIMPOSED ON STAGE 3 CHRONIC KIDNEY DISEASE (HCC): Status: ACTIVE | Noted: 2019-01-01

## 2019-10-23 PROBLEM — J18.9 SEPSIS DUE TO PNEUMONIA (HCC): Status: ACTIVE | Noted: 2019-01-01

## 2019-10-23 PROBLEM — R60.0 EDEMA OF LEFT LOWER EXTREMITY: Status: ACTIVE | Noted: 2019-01-01

## 2019-10-23 PROBLEM — N18.30 ACUTE RENAL FAILURE SUPERIMPOSED ON STAGE 3 CHRONIC KIDNEY DISEASE (HCC): Status: ACTIVE | Noted: 2019-01-01

## 2019-10-23 PROBLEM — N30.00 ACUTE CYSTITIS WITHOUT HEMATURIA: Status: ACTIVE | Noted: 2019-01-01

## 2019-10-23 NOTE — ASSESSMENT & PLAN NOTE
Chest x-ray in ED: Scarring/atelectasis at the right lower lung field otherwise no acute cardiopulmonary disease  ER initiated levofloxacin to treat both pneumonia and UTI; this was continued at renal dose  Will add Vancomycin given patient is meeting sepsis criteria  Patient treated judiciously with IV fluid boluses based on history of CHF  Procalcitonin elevated at 0 41 and 0 53, continue to follow  Influenza PCR pending  Legionella antigen and strep pneumonia antigen- negative  Blood, and sputum culture pending  Repeat PCXR:VERY LIMITED STUDY  Left cardiac opacity may be artifactual, cannot exclude infiltrate  Recommend repeat frontal and lateral views with better positioning  Chest x-ray PA/Lat- pending read  The patient continues to have tachypnea however she denies this when asked  Will check ABG

## 2019-10-23 NOTE — ASSESSMENT & PLAN NOTE
Acute encephalopathy secondary to sepsis  Will treat underlying infection and monitor cognitive status  Family at bedside states patient continues to be confused

## 2019-10-23 NOTE — RESPIRATORY THERAPY NOTE
RT Protocol Note  Nadean Dandy 80 y o  female MRN: [id number]  Unit/Bed#: 425-01 Encounter: [id number]    Assessment    Principal Problem:    Sepsis due to urinary tract infection [facility])  Active Problems:    Ambulatory dysfunction    Atrial fibrillation (HCC)    Chronic diastolic congestive heart failure (HCC)    Rheumatoid arthritis (HCC)    Troponin level elevated    Acute encephalopathy    Pneumonia of right lower lobe due to infectious organism [facility])      Home Pulmonary Medications:  none       Past Medical History:   Diagnosis Date    Allergic drug reaction     Cellulitis, leg     Chronic atrial fibrillation     [facility] 7/24/2018    Dilated cardiomyopathy ([facility] )     Essential (primary) hypertension     Heart failure, unspecified ([facility] )     History of echocardiogram 10/28/2015    EF 0 55 (55%), Normal LVSF  Mild to mod MR 2+  Left atrial enlargement   History of echocardiogram 01/03/2018    2-D w/ CFD; EF 0 55 (55%), Normal left vent systolic function  Mod mitral regurg  Left atrial enlargement   History of echocardiogram 06/14/2017    2-D w/CFD    Long term (current) use of anticoagulants     Lymphedema, not elsewhere classified     Mitral regurgitation     Pancreatitis     Rheumatic mitral insufficiency     Rheumatoid arthritis (HCC)     Stasis dermatitis      Social History     Socioeconomic History    Marital status:       Spouse name: None    Number of children: None    Years of education: None    Highest education level: None   Occupational History    None   Social Needs    Financial resource strain: None    Food insecurity:     Worry: None     Inability: None    Transportation needs:     Medical: None     Non-medical: None   Tobacco Use    Smoking status: Never Smoker    Smokeless tobacco: Never Used   Substance and Sexual Activity    Alcohol use: No    Drug use: No    Sexual activity: Never   Lifestyle    Physical activity:     Days per week: None     Minutes per session: None    Stress: None   Relationships    Social connections:     Talks on phone: None     Gets together: None     Attends Orthodoxy service: None     Active member of club or organization: None     Attends meetings of clubs or organizations: None     Relationship status: None    Intimate partner violence:     Fear of current or ex partner: None     Emotionally abused: None     Physically abused: None     Forced sexual activity: None   Other Topics Concern    None   Social History Narrative    Advance directive on file    Patient has living will       Subjective  Pt offers no respiratory complaints at this time  Objective    Physical Exam:   Assessment Type: Assess only  General Appearance: Awake, Alert  Respiratory Pattern: Normal, Symmetrical  Chest Assessment: Chest expansion symmetrical  Bilateral Breath Sounds: Clear  O2 Device: RA    Vitals:  Blood pressure 158/77, pulse 98, temperature 97 6 °F (36 4 °C), temperature source Temporal, resp  rate 20, weight 55 9 kg (123 lb 3 8 oz), SpO2 95 %  Imaging and other studies: I have personally reviewed pertinent reports  O2 Device: RA     Plan    Respiratory Plan: No distress/Pulmonary history, Discontinue Protocol   No tx indicated at this time

## 2019-10-23 NOTE — H&P
H&P Exam - Hasmukh No 80 y o  female MRN: [id number]    Unit/Bed#: 425-01 Encounter: [id number]      Assessment/Plan       * Sepsis due to pneumonia [facility])  Assessment & Plan  Patient brought to ER by family for rapid development of confusion, generalized weakness, and lethargy since yesterday  Patient lives with 2 adult sons and adult daughter also assists regularly in her care  They report mild cognitive impairment at baseline, but patient has become completely disoriented and has generally stopped answering questions at this point  She was able to take medications this morning, but appetite has been poor today  No other significant change in health recently  3 of 4 SIRS criteria positive (leukocytosis, tachycardia, and tachypnea)  Chest x-ray and pulmonary auscultation consistent with right lower lobe pneumonia  Initial urine sample consistent with UTI  ER initiated levofloxacin to treat both pneumonia and UTI; will continue at renal dose  Patient treated judiciously with IV fluid boluses based on history of CHF  Procalcitonin, influenza PCR, Legionella antigen, and strep pneumonia antigen ordered  Blood, sputum, and urine cultures pending  Pneumonia of right lower lobe due to infectious organism [facility])  Assessment & Plan  Chest x-ray and pulmonary auscultation consistent with right lower lobe pneumonia  ER initiated levofloxacin to treat both pneumonia and UTI; will continue at renal dose  Patient treated judiciously with IV fluid boluses based on history of CHF  Procalcitonin, influenza PCR, Legionella antigen, and strep pneumonia antigen ordered  Blood and sputum cultures pending  Acute cystitis without hematuria  Assessment & Plan  Initial urine sample consistent with UTI  ER initiated levofloxacin to treat both pneumonia and UTI; will continue at renal dose  Patient treated judiciously with IV fluid boluses based on history of CHF  Blood and urine cultures pending        Acute encephalopathy  Assessment & Plan  Acute encephalopathy secondary to sepsis  Will treat underlying infection and monitor cognitive status  Acute renal failure superimposed on stage 3 chronic kidney disease ([facility] )  Assessment & Plan  Chronic kidney disease with baseline creatinine approximately 0 6; creatinine 1 05 at time of presentation  Patient given small boluses of normal saline (250 mL and 500 mL) in ER and maintenance fluid initiated at low rate due to history of CHF  Monitor renal function and fluid/electrolyte status  Minimize nephrotoxins  Renally dose medications  Troponin level elevated  Assessment & Plan  Mildly elevated troponin 0 15 at time of presentation consistent with non MI troponin elevation due to sepsis  EKG reassuring  As patient currently encephalopathic and unable to reliably report chest pain or other ACS symptoms, will check serial troponin and EKG to ensure stability  [facility] )  Assessment & Plan  Chronic stable condition  No evidence of acute exacerbation  Continue outpatient medication regimen  Chronic diastolic congestive heart failure [facility])  Assessment & Plan  Wt Readings from Last 3 Encounters:   10/22/19 55 2 kg (121 lb 11 1 oz)   09/26/19 56 7 kg (125 lb)   08/14/19 56 7 kg (125 lb)       Chronic stable condition  No evidence of acute exacerbation  Continue outpatient medication regimen  Longstanding persistent atrial fibrillation  Assessment & Plan  Chronic stable condition  Heart rate currently normal; controlled with metoprolol and diltiazem  Patient currently on warfarin for anticoagulation  Continue outpatient medication regimen  Ambulatory dysfunction  Assessment & Plan  Chronic stable condition of multifactorial nature, with contributions from physical deconditioning, sequelae of arthritis, and other chronic medical issues  Patient normally ambulates with dual walkers at home  Consult PT/OT        History of Present Illness   HPI:  Mayur Bender is a 80 y o  female who presents with rapid development of confusion, generalized weakness, and lethargy since yesterday  Patient lives with 2 adult sons and adult daughter also assists regularly in her care  They report mild cognitive impairment at baseline, but patient has become completely disoriented and has generally stopped answering questions at this point  She was able to take medications this morning, but appetite has been poor today  No other significant change in health recently  PCP: Paul Joelle, DO    Review of Systems   Constitutional: Positive for appetite change and fatigue  Negative for chills, diaphoresis and fever  Respiratory: Negative for cough  Cardiovascular: Positive for leg swelling  Recurrent bilateral leg swelling   Gastrointestinal: Negative for constipation, diarrhea, nausea and vomiting  Skin: Negative for rash and wound  Neurological: Positive for weakness  Psychiatric/Behavioral: Positive for confusion  All other systems reviewed and are negative  Historical Information   Past Medical History:   Diagnosis Date    Acute renal failure superimposed on stage 3 chronic kidney disease ([facility] ) 10/23/2019    Allergic drug reaction     Cellulitis, leg     Chronic atrial fibrillation     Chronic diastolic congestive heart failure ([facility] ) 7/24/2018    Dilated cardiomyopathy ([facility] )     Essential (primary) hypertension     [facility], [facility]     History of echocardiogram 10/28/2015    EF 0 55 (55%), Normal LVSF  Mild to mod MR 2+  Left atrial enlargement   History of echocardiogram 01/03/2018    2-D w/ CFD; EF 0 55 (55%), Normal left vent systolic function  Mod mitral regurg  Left atrial enlargement      History of echocardiogram 06/14/2017    2-D w/CFD    Long term (current) use of anticoagulants     Longstanding persistent atrial fibrillation 7/2/2012    On warfarin    Lymphedema, not elsewhere classified     Mitral regurgitation     Pancreatitis     Rheumatic mitral insufficiency     Rheumatoid arthritis (HCC)     Stasis dermatitis      Past Surgical History:   Procedure Laterality Date    CHOLECYSTECTOMY      followed by procedure to remove gallstones in a pouch outside of gallbladder    TOOTH EXTRACTION       Social History   Social History     Substance and Sexual Activity   Alcohol Use Not Currently    Alcohol/week: 0 0 standard drinks    Frequency: Never    Binge frequency: Never     Social History     Substance and Sexual Activity   Drug Use No     Social History     Tobacco Use   Smoking Status Never Smoker   Smokeless Tobacco Never Used     Family History: non-contributory    Meds/Allergies   all medications and allergies reviewed  Allergies   Allergen Reactions    Azithromycin Rash    Cephalexin Itching and Rash     Reaction Date: 29Feb2012;     Latex Rash    Mupirocin Rash       Objective   Vitals: Blood pressure 136/80, pulse 58, temperature 98 6 °F (37 °C), temperature source Oral, resp  rate 18, height 5' 6" (1 676 m), weight 55 2 kg (121 lb 11 1 oz), SpO2 96 %  Intake/Output Summary (Last 24 hours) at 10/23/2019 0556  Last data filed at 10/22/2019 2008  Gross per 24 hour   Intake    Output 900 ml   Net -900 ml       Invasive Devices     Peripheral Intravenous Line            Peripheral IV 10/22/19 Right Antecubital less than 1 day          Drain            Urethral Catheter less than 1 day                Physical Exam   Constitutional: She appears well-developed and well-nourished  No distress  Elderly female lying in ER bed  Alert but generally not responsive to verbal communication  Patient appears mildly irritated  HENT:   Head: Normocephalic and atraumatic  Right Ear: External ear normal    Left Ear: External ear normal    Nose: Nose normal    Eyes: Pupils are equal, round, and reactive to light   Conjunctivae and EOM are normal  Right eye exhibits no discharge  Left eye exhibits no discharge  No scleral icterus  Neck: No JVD present  No tracheal deviation present  No thyromegaly present  Cardiovascular: Normal rate, normal heart sounds and intact distal pulses  An irregularly irregular rhythm present  Exam reveals no gallop and no friction rub  No murmur heard  Pulmonary/Chest: Effort normal  No stridor  No respiratory distress  She has decreased breath sounds in the right lower field  She has no wheezes  She has no rales  She exhibits no tenderness  Abdominal: Soft  Bowel sounds are normal  She exhibits no distension and no mass  There is no tenderness  There is no rebound and no guarding  Musculoskeletal: She exhibits edema  She exhibits no tenderness or deformity  2+ symmetric bilateral lower extremity swelling in proximal lower legs  Lower legs wrapped from just below knee to distal foot to minimize edema  Lymphadenopathy:     She has no cervical adenopathy  Neurological: She is alert  Patient alert but minimally responsive to questions  Unable to assess orientation  Skin: Skin is warm and dry  Capillary refill takes less than 2 seconds  No rash noted  She is not diaphoretic  No erythema  No pallor  Psychiatric:   Unable to assess secondary to patient's lack of responsiveness   Nursing note and vitals reviewed  Lab Results: I have personally reviewed pertinent reports  Imaging: I have personally reviewed pertinent reports  and I have personally reviewed pertinent films in PACS  EKG, Pathology, and Other Studies: I have personally reviewed pertinent reports  Code Status: Level 3 - DNAR and DNI  Advance Directive and Living Will:      Power of :    POLST:      Counseling / Coordination of Care  Total floor / unit time spent today 50 minutes  Greater than 50% of total time was spent with the patient and / or family counseling and / or coordination of care    A description of the counseling / coordination of care: Greater than 25 minutes spent with this patient discussing diagnosis, prognosis, and plan of care

## 2019-10-23 NOTE — ASSESSMENT & PLAN NOTE
Chronic stable condition of multifactorial nature, with contributions from physical deconditioning, sequelae of arthritis, and other chronic medical issues  Patient normally ambulates with dual walkers at home  Consult PT/OT

## 2019-10-23 NOTE — SOCIAL WORK
Met with pt's son Lupe De Luna via telephone to  discuss role as  in helping pt to develop discharge plan and to help pt carry out their plan  Pt lives in a house with her 2 adult sons   There is no steps on the outside  Pt stays on the first floor  Pt has a bedside commode  And 2 canes   Pt uses 2 canes to ambulate  Pt's daughter gives the patient a sponge bath and helps with Dressing  Pt has the bedside commode set up on the first floor  Pt's daughter and son cook for the patient   Pt is able to feed himself  Pt has never had home care or any services in the home  Pt went to [facility] [facility] years ago  Pt's children drive her [facility] [employer]  Pt's PCP is Dr Heriberto Yoo  Pt's cardiologist is Dr Ben Tidwell  Pt uses [facility]   Pt was given a discharge check list and Meds to [facility]  Both reviewed with a good understanding  Pt might benefit from Home care on discharge  I will continue to follow for any Case Management needs

## 2019-10-23 NOTE — RESPIRATORY THERAPY NOTE
RT Protocol Note  Mayur Bender 80 y o  female MRN: [id number]  Unit/Bed#: 425-01 Encounter: [id number]    Assessment    Principal Problem:    Sepsis due to pneumonia [facility])  Active Problems:    Ambulatory dysfunction    Longstanding persistent atrial fibrillation    Chronic diastolic congestive heart failure (HCC)    Rheumatoid arthritis (HCC)    Troponin level elevated    Acute encephalopathy    Pneumonia of right lower lobe due to infectious organism [facility])    Acute cystitis without hematuria    Acute renal failure superimposed on stage 3 chronic kidney disease ([place] [address] )      Home Pulmonary Medications:  No home respiratory meds       Past Medical History:   Diagnosis Date    Acute renal failure superimposed on stage 3 chronic kidney disease ([place] [address] ) 10/23/2019    Allergic drug reaction     Cellulitis, leg     Chronic atrial fibrillation     Chronic diastolic congestive heart failure ([place] [address] ) 7/24/2018    Dilated cardiomyopathy ([place] [address] )     Essential (primary) hypertension     Heart failure, unspecified ([place] [address] )     History of echocardiogram 10/28/2015    EF 0 55 (55%), Normal LVSF  Mild to mod MR 2+  Left atrial enlargement   History of echocardiogram 01/03/2018    2-D w/ CFD; EF 0 55 (55%), Normal left vent systolic function  Mod mitral regurg  Left atrial enlargement   History of echocardiogram 06/14/2017    2-D w/CFD    Long term (current) use of anticoagulants     Longstanding persistent atrial fibrillation 7/2/2012    On warfarin    Lymphedema, not elsewhere classified     Mitral regurgitation     Pancreatitis     Rheumatic mitral insufficiency     Rheumatoid arthritis ([place] [address] )     Stasis dermatitis      Social History     Socioeconomic History    Marital status:       Spouse name: None    Number of children: None    Years of education: None    Highest education level: None   Occupational History    None   Social Needs    Financial resource strain: None    Food insecurity:     Worry: 2 None     Inability: None    Transportation needs:     Medical: None     Non-medical: None   Tobacco Use    Smoking status: Never Smoker    Smokeless tobacco: Never Used   Substance and Sexual Activity    Alcohol use: Not Currently     Alcohol/week: 0 0 standard drinks     Frequency: Never     Binge frequency: Never    Drug use: No    Sexual activity: Never   Lifestyle    Physical activity:     Days per week: None     Minutes per session: None    Stress: None   Relationships    Social connections:     Talks on phone: None     Gets together: None     Attends Jainism service: None     Active member of club or organization: None     Attends meetings of clubs or organizations: None     Relationship status: None    Intimate partner violence:     Fear of current or ex partner: None     Emotionally abused: None     Physically abused: None     Forced sexual activity: None   Other Topics Concern    None   Social History Narrative    Advance directive on file    Patient has living will       Subjective         Objective    Physical Exam:   Assessment Type: (P) Assess only  General Appearance: (P) Alert, Awake  Respiratory Pattern: (P) Labored  Chest Assessment: (P) Chest expansion symmetrical  Bilateral Breath Sounds: (P) Clear    Vitals:  Blood pressure 135/80, pulse (!) 117, temperature 99 3 °F (37 4 °C), resp  rate (P) 19, height 5' 6" (1 676 m), weight 55 4 kg (122 lb 2 2 oz), SpO2 94 %  Imaging and other studies: I have personally reviewed pertinent reports        O2 Device: RA     Plan    Respiratory Plan: (P) No distress/Pulmonary history      Pt has no pulmonary history with clear breath sounds, ordering pt on PRN txs

## 2019-10-23 NOTE — PROGRESS NOTES
Vancomycin Assessment    Rosie Frazier is a 80 y o  female who is currently receiving vancomycin 750mg q12h for Pneumonia     Relevant clinical data and objective history reviewed:  Creatinine   Date Value Ref Range Status   10/23/2019 0 91 0 60 - 1 30 mg/dL Final     Comment:     Standardized to IDMS reference method   10/22/2019 1 05 0 60 - 1 30 mg/dL Final     Comment:     Standardized to IDMS reference method   08/12/2019 0 68 0 60 - 1 30 mg/dL Final     Comment:     Standardized to IDMS reference method   04/06/2015 0 62 0 60 - 1 30 mg/dL Final     Comment:     Standardized to IDMS reference method   11/01/2014 0 83 0 60 - 1 30 mg/dL Final     Comment:     Standardized to IDMS reference method   10/31/2014 0 79 0 60 - 1 30 mg/dL Final     Comment:     Standardized to IDMS reference method     /99   Pulse 100   Temp 98 °F (36 7 °C)   Resp 18   Ht 5' 6" (1 676 m)   Wt 55 4 kg (122 lb 2 2 oz)   SpO2 97%   BMI 19 71 kg/m²   I/O last 3 completed shifts:  In: -   Out: 900 [Urine:900]  Lab Results   Component Value Date/Time    BUN 20 10/23/2019 05:59 AM    BUN 14 04/06/2015 11:15 AM    WBC 20 80 (H) 10/23/2019 05:59 AM    WBC 5 44 04/06/2015 11:15 AM    HGB 12 0 10/23/2019 05:59 AM    HGB 14 0 04/06/2015 11:15 AM    HCT 38 5 10/23/2019 05:59 AM    HCT 43 5 04/06/2015 11:15 AM    MCV 99 (H) 10/23/2019 05:59 AM    MCV 95 04/06/2015 11:15 AM     10/23/2019 05:59 AM     04/06/2015 11:15 AM     Temp Readings from Last 3 Encounters:   10/23/19 98 °F (36 7 °C)   10/01/15 (!) 97 2 °F (36 2 °C)   06/23/15 98 6 °F (37 °C)     Vancomycin Days of Therapy: 1    Assessment/Plan  The patient is currently on vancomycin utilizing scheduled dosing based on actual body weight  Baseline risks associated with therapy include: pre-existing renal impairment and advanced age  The patient is currently receiving 750mg q12h and after clinical evaluation will be changed to 1000mg q24h    Pharmacy will also follow closely for s/sx of nephrotoxicity, infusion reactions and appropriateness of therapy  BMP and CBC will be ordered per protocol  Plan for trough as patient approaches steady state, prior to the 4th  dose at approximately 1630 on 10/26/19  Due to infection severity, will target a trough of 15-20 (appropriate for most indications)   Pharmacy will continue to follow the patients culture results and clinical progress daily      Matthew Kelley, Pharmacist

## 2019-10-23 NOTE — ASSESSMENT & PLAN NOTE
Chronic stable condition  No evidence of acute exacerbation  Continue outpatient medication regimen

## 2019-10-23 NOTE — PROGRESS NOTES
Progress Note - Rubio Ocasio 7/14/1930, 80 y o  female MRN: [id number]    Unit/Bed#: 425-01 Encounter: [id number]    Primary Care Provider: Korey Nunez DO   Date and time admitted to hospital: 10/22/2019  6:09 PM        * Sepsis due to pneumonia [facility])  Assessment & Plan  Sepsis present on admission with leukocytosis, tachycardia, and tachypnea  Chest x-ray in ED: Scarring/atelectasis at the right lower lung field otherwise no acute cardiopulmonary disease  ER initiated levofloxacin to treat both pneumonia and UTI; this was continued at renal dose  Will add Vancomycin given patient is meeting sepsis criteria  Check MRSA nasal culture  Patient treated judiciously with IV fluid boluses based on history of CHF  Procalcitonin elevated at 0 41 and 0 53, continue to follow  Influenza PCR pending  Legionella antigen and strep pneumonia antigen- negative  Blood, and sputum culture pending  Repeat PCXR:VERY LIMITED STUDY  Left cardiac opacity may be artifactual, cannot exclude infiltrate  Recommend repeat frontal and lateral views with better positioning  Chest x-ray PA/Lat- pending read  Labs in am       Pneumonia of right lower lobe due to infectious organism [facility])  Assessment & Plan  Chest x-ray in ED: Scarring/atelectasis at the right lower lung field otherwise no acute cardiopulmonary disease  ER initiated levofloxacin to treat both pneumonia and UTI; this was continued at renal dose  Will add Vancomycin given patient is meeting sepsis criteria  Patient treated judiciously with IV fluid boluses based on history of CHF  Procalcitonin elevated at 0 41 and 0 53, continue to follow  Influenza PCR pending  Legionella antigen and strep pneumonia antigen- negative  Blood, and sputum culture pending  Repeat PCXR:VERY LIMITED STUDY  Left cardiac opacity may be artifactual, cannot exclude infiltrate  Recommend repeat frontal and lateral views with better positioning      Chest x-ray PA/Lat- pending read  The patient continues to have tachypnea however she denies this when asked  Will check ABG  Acute cystitis without hematuria  Assessment & Plan  Possible UTI on UA  ER initiated levofloxacin to treat both pneumonia and UTI; will continue at renal dose  Patient treated judiciously with IV fluid boluses based on history of CHF  Blood and urine cultures pending  Acute encephalopathy  Assessment & Plan  Acute encephalopathy secondary to sepsis  Will treat underlying infection and monitor cognitive status  Family at bedside states patient continues to be confused  Acute renal failure superimposed on stage 3 chronic kidney disease ([facility] [address] )  Assessment & Plan  Chronic kidney disease with baseline creatinine approximately 0 6; creatinine 1 05 at time of presentation  Patient given small boluses of normal saline (250 mL and 500 mL) in ER and then maintenance fluid initiated at low rate due to history of CHF  IV fluids held for now given continue tachypnea and elevated BNP  Monitor renal function and fluid/electrolyte status  Minimize nephrotoxins  Troponin level elevated  Assessment & Plan  Likely non MI troponin elevation due to sepsis  EKG reassuring  Troponin: 0 15, 0 16, 0 16        Rheumatoid arthritis ([facility] [address] )  Assessment & Plan  Chronic stable condition  No evidence of acute exacerbation  Continue outpatient medication regimen  Chronic diastolic congestive heart failure [facility])  Assessment & Plan  Wt Readings from Last 3 Encounters:   10/22/19 55 2 kg (121 lb 11 1 oz)   09/26/19 56 7 kg (125 lb)   08/14/19 56 7 kg (125 lb)       Chronic stable condition  No evidence of acute exacerbation  Hold PO lasix in the setting of pneumonia/sepsis  Check echocardiogram  Daily weights, I&O's  Longstanding persistent atrial fibrillation  Assessment & Plan  Chronic stable condition  Heart rate currently normal; controlled with metoprolol and diltiazem    Patient currently on warfarin for anticoagulation  Continue outpatient medication regimen  Ambulatory dysfunction  Assessment & Plan  Chronic stable condition of multifactorial nature, with contributions from physical deconditioning, sequelae of arthritis, and other chronic medical issues  Patient normally ambulates with dual walkers at home  Consult PT/OT  Edema of left lower extremity  Assessment & Plan  Left foot with erythema and edema  Per family at bedside this is new  Will check Venous duplex LLE      VTE Pharmacologic Prophylaxis:   Pharmacologic: Warfarin (Coumadin)  Mechanical VTE Prophylaxis in Place: Yes    Patient Centered Rounds: I have performed bedside rounds with nursing staff today  Discussions with Specialists or Other Care Team Provider: nursing, CM, and attending    Education and Discussions with Family / Patient: family updated at bedside    Time Spent for Care: 30 minutes  More than 50% of total time spent on counseling and coordination of care as described above  Current Length of Stay: 1 day(s)    Current Patient Status: Inpatient   Certification Statement: The patient will continue to require additional inpatient hospital stay due to continued need for IV antibiotics and monitoring due to tachypnea  Discharge Plan: TBD    Code Status: Level 3 - DNAR and DNI      Subjective: The patient was seen and examined  The patient denies any complaints however she appears tachypneic on exam     Objective:     Vitals:   Temp (24hrs), Av 4 °F (36 9 °C), Min:97 6 °F (36 4 °C), Max:99 3 °F (37 4 °C)    Temp:  [97 6 °F (36 4 °C)-99 3 °F (37 4 °C)] 98 °F (36 7 °C)  HR:  [] 100  Resp:  [18-34] 18  BP: (116-169)/() 142/99  SpO2:  [81 %-97 %] 97 %  Body mass index is 19 71 kg/m²  Input and Output Summary (last 24 hours):        Intake/Output Summary (Last 24 hours) at 10/23/2019 1621  Last data filed at 10/22/2019 2008  Gross per 24 hour   Intake    Output 900 ml   Net -900 ml Physical Exam:     Physical Exam   Constitutional: She appears well-developed and well-nourished  She is active and cooperative  Cardiovascular: Normal rate  An irregularly irregular rhythm present  Pulmonary/Chest: Tachypnea noted  She has no wheezes  She has no rhonchi  She has no rales  Abdominal: Soft  Normal appearance and bowel sounds are normal  There is no tenderness  Neurological: She is alert  No cranial nerve deficit  Skin: Skin is warm, dry and intact  Nursing note and vitals reviewed  Additional Data:     Labs:    Results from last 7 days   Lab Units 10/23/19  0559   WBC Thousand/uL 20 80*   HEMOGLOBIN g/dL 12 0   HEMATOCRIT % 38 5   PLATELETS Thousands/uL 162   NEUTROS PCT % 89*   LYMPHS PCT % 3*   MONOS PCT % 7   EOS PCT % 0     Results from last 7 days   Lab Units 10/23/19  0559   SODIUM mmol/L 141   POTASSIUM mmol/L 4 1   CHLORIDE mmol/L 108   CO2 mmol/L 22   BUN mg/dL 20   CREATININE mg/dL 0 91   ANION GAP mmol/L 11   CALCIUM mg/dL 8 6   ALBUMIN g/dL 2 5*   TOTAL BILIRUBIN mg/dL 1 30*   ALK PHOS U/L 93   ALT U/L 33   AST U/L 31   GLUCOSE RANDOM mg/dL 98     Results from last 7 days   Lab Units 10/22/19  1934   INR  1 89*             Results from last 7 days   Lab Units 10/23/19  0559 10/22/19  2345 10/22/19  1933   LACTIC ACID mmol/L  --   --  2 0   PROCALCITONIN ng/ml 0 53* 0 41*  --            * I Have Reviewed All Lab Data Listed Above  * Additional Pertinent Lab Tests Reviewed:  All Labs Within Last 24 Hours Reviewed    Imaging:    Imaging Reports Reviewed Today Include: Cxr  Imaging Personally Reviewed by Myself Includes:  none    Recent Cultures (last 7 days):     Results from last 7 days   Lab Units 10/23/19  0940   LEGIONELLA URINARY ANTIGEN  Negative       Last 24 Hours Medication List:     Current Facility-Administered Medications:  acetaminophen 650 mg Oral Q6H PRN Opla Magaña MD   Or       oxyCODONE-acetaminophen 1 tablet Oral Q6H PRN Opal Magaña MD   aluminum-magnesium hydroxide-simethicone 30 mL Oral Q6H PRN Opal Magaña MD   diltiazem 120 mg Oral Daily Opal Magaña MD   docusate sodium 100 mg Oral BID PRN Opal Magaña MD   enoxaparin 30 mg Subcutaneous Daily Opal Magaña MD   [START ON 10/24/2019] levofloxacin 750 mg Intravenous Q48H Opal Magaña MD   lisinopril 40 mg Oral Daily Opal Magaña MD   magnesium oxide 400 mg Oral Daily Opal Magaña MD   metoprolol tartrate 25 mg Oral Daily Opal Magaña MD   multivitamin-minerals 1 tablet Oral Daily Opal Magaña MD   ondansetron 4 mg Intravenous Q6H PRN Opal Magaña MD   potassium chloride 20 mEq Oral Daily Opal Magaña MD   triamcinolone  Topical BID Opal Magaña MD   vancomycin 15 mg/kg (Adjusted) Intravenous Q12H Olga Hernandez PA-C   warfarin 3 mg Oral Once per day on Sun Mon Tue Thu Fri Sat Opal Magaña MD   warfarin 4 mg Oral Once per day on Zeny Bernstein MD        Today, Patient Was Seen By: Olga Hernandez PA-C    ** Please Note: Dictation voice to text software may have been used in the creation of this document   **

## 2019-10-23 NOTE — ASSESSMENT & PLAN NOTE
Chronic kidney disease with baseline creatinine approximately 0 6; creatinine 1 05 at time of presentation  Patient given small boluses of normal saline (250 mL and 500 mL) in ER and then maintenance fluid initiated at low rate due to history of CHF  IV fluids held for now given continue tachypnea and elevated BNP  Monitor renal function and fluid/electrolyte status  Minimize nephrotoxins

## 2019-10-23 NOTE — ASSESSMENT & PLAN NOTE
Wt Readings from Last 3 Encounters:   10/22/19 55 2 kg (121 lb 11 1 oz)   09/26/19 56 7 kg (125 lb)   08/14/19 56 7 kg (125 lb)       Chronic stable condition  No evidence of acute exacerbation  Hold PO lasix in the setting of pneumonia/sepsis  Check echocardiogram  Daily weights, I&O's

## 2019-10-23 NOTE — ASSESSMENT & PLAN NOTE
Sepsis present on admission with leukocytosis, tachycardia, and tachypnea  Chest x-ray in ED: Scarring/atelectasis at the right lower lung field otherwise no acute cardiopulmonary disease  ER initiated levofloxacin to treat both pneumonia and UTI; this was continued at renal dose  Will add Vancomycin given patient is meeting sepsis criteria  Check MRSA nasal culture  Patient treated judiciously with IV fluid boluses based on history of CHF  Procalcitonin elevated at 0 41 and 0 53, continue to follow  Influenza PCR pending  Legionella antigen and strep pneumonia antigen- negative  Blood, and sputum culture pending  Repeat PCXR:VERY LIMITED STUDY  Left cardiac opacity may be artifactual, cannot exclude infiltrate  Recommend repeat frontal and lateral views with better positioning      Chest x-ray PA/Lat- pending read  Labs in am

## 2019-10-23 NOTE — ASSESSMENT & PLAN NOTE
Chronic stable condition  Heart rate currently normal; controlled with metoprolol and diltiazem  Patient currently on warfarin for anticoagulation  Continue outpatient medication regimen

## 2019-10-23 NOTE — PHYSICAL THERAPY NOTE
PHYSICAL THERAPY NOTE          Patient Name: Hadley DODD'S Date: 10/23/2019     Order received and chart review performed  PT evaluation attempted this PM, however PA-C, respiratory supervisor, and RT at bedside  Will re-attempt PT evaluation and mobility assessment as able        Genesis Mendez, PT, DPT

## 2019-10-23 NOTE — ASSESSMENT & PLAN NOTE
Possible UTI on UA  ER initiated levofloxacin to treat both pneumonia and UTI; will continue at renal dose  Patient treated judiciously with IV fluid boluses based on history of CHF  Blood and urine cultures pending

## 2019-10-24 PROBLEM — I34.0 MITRAL VALVE INSUFFICIENCY: Status: ACTIVE | Noted: 2019-01-01

## 2019-10-24 PROBLEM — R23.8 SKIN BREAKDOWN: Status: ACTIVE | Noted: 2019-01-01

## 2019-10-24 PROBLEM — I50.21 ACUTE SYSTOLIC CHF (CONGESTIVE HEART FAILURE) (HCC): Status: ACTIVE | Noted: 2019-01-01

## 2019-10-24 PROBLEM — G92.9 TOXIC ENCEPHALOPATHY: Status: ACTIVE | Noted: 2019-01-01

## 2019-10-24 PROBLEM — I27.20 PULMONARY HYPERTENSION (HCC): Status: ACTIVE | Noted: 2019-01-01

## 2019-10-24 PROBLEM — I48.91 ATRIAL FIBRILLATION WITH RAPID VENTRICULAR RESPONSE (HCC): Status: ACTIVE | Noted: 2019-01-01

## 2019-10-24 PROBLEM — A41.9 SEPSIS (HCC): Status: ACTIVE | Noted: 2019-01-01

## 2019-10-24 NOTE — PROGRESS NOTES
Vancomycin IV Pharmacy-to-Dose Consultation    Cecelia Alvarez is a 80 y o  female who is currently receiving Vancomycin IV with management by the Pharmacy Consult service  Assessment/Plan:  The patient was reviewed  Renal function is stable and no signs or symptoms of nephrotoxicity and/or infusion reactions were documented in the chart  Based on todays assessment, continue current vancomycin (day # 2) dosing of 1000mg q24h, with a plan for trough to be drawn at 1630 on 10/26/19  We will continue to follow the patients culture results and clinical progress daily      Raghav Saunders, Pharmacist

## 2019-10-24 NOTE — ASSESSMENT & PLAN NOTE
· Likely non-MI troponin elevation secondary to sepsis and secondary to atrial fibrillation with rapid ventricular response

## 2019-10-24 NOTE — OCCUPATIONAL THERAPY NOTE
Occupational Therapy         Patient Name: Elias Beaulieuludmila DAOFGUIDO Date: 10/24/2019    Pt transferred to ICU for elevated HR; will complete evaluation when pt is medically appropriate

## 2019-10-24 NOTE — ASSESSMENT & PLAN NOTE
Wt Readings from Last 3 Encounters:   10/24/19 55 4 kg (122 lb 2 2 oz)   19 56 7 kg (125 lb)   19 56 7 kg (125 lb)     · Utilize lasix 40 mg IV BID  · Consult Cardiology  · Daily weights  · Strict intake/output measurements    Echo complete with contrast if indicated   Status: Final result   PACS Images      Show images for Echo complete with contrast if indicated   Study Result     [address]  [address], [place] [address]  [phone]     Transthoracic Echocardiogram  2D, M-mode, Doppler, and Color Doppler     Study date:  23-Oct-2019     Patient: Kalpesh March  MR number: [id number]  Account number: [de-identified]  : 1930  Age: 80 years  Gender: Female  Status: Inpatient  Location: Echo lab  Height: 66 in  Weight: 122 lb  BP: 118/ 70 mmHg     Indications: Confusion     Diagnoses: R53 1 - Weakness     Sonographer:  Laura Bettencourt RDCS, CCT  Primary Physician:  Joaquina Prajapati DO  Referring Physician:  Morgan Willis DO  Group:  [facility]  Interpreting Physician:  Caroline Garcia MD     SUMMARY     PROCEDURE INFORMATION:  This was a technically difficult study      LEFT VENTRICLE:  Systolic function was moderately to markedly reduced by visual assessment  Ejection fraction was estimated in the range of 55 % to 65 % to be 35 %  There was moderate diffuse hypokinesis most pronounced at the basal to mid segments      RIGHT VENTRICLE:  The size was normal   Systolic function was moderately reduced      LEFT ATRIUM:  The atrium was moderately dilated      MITRAL VALVE:  There was moderate to severe regurgitation      AORTIC VALVE:  There was mild stenosis  There was moderate regurgitation      TRICUSPID VALVE:  There was moderate regurgitation      HISTORY: PRIOR HISTORY: altered mental status     PROCEDURE: The procedure was performed in the echo lab  This was a routine study  The transthoracic approach was used   The study included complete 2D imaging, M-mode, complete spectral Doppler, and color Doppler  The heart rate was 80 bpm,  at the start of the study  Echocardiographic views were limited due to poor patient compliance and combative patient  This was a technically difficult study      LEFT VENTRICLE: Size was normal  Systolic function was moderately to markedly reduced by visual assessment  Ejection fraction was estimated in the range of 55 % to 65 % to be 35 %  There was moderate diffuse hypokinesis most pronounced at  the basal to mid segments  Wall thickness was normal      RIGHT VENTRICLE: The size was normal  Systolic function was moderately reduced  Wall thickness was normal      LEFT ATRIUM: The atrium was moderately dilated      MITRAL VALVE: Valve structure was normal  There was normal leaflet separation  DOPPLER: The transmitral velocity was within the normal range  There was no evidence for stenosis  There was moderate to severe regurgitation      AORTIC VALVE: The valve was trileaflet  Leaflets exhibited mildly increased thickness, mild calcification, and mildly reduced cuspal separation  DOPPLER: Transaortic velocity was within the normal range  There was mild stenosis  There was  moderate regurgitation      TRICUSPID VALVE: The valve structure was normal  There was normal leaflet separation  DOPPLER: The transtricuspid velocity was within the normal range  There was no evidence for stenosis  There was moderate regurgitation  Estimated peak PA  pressure was 40 mmHg   The findings suggest mild pulmonary hypertension      SYSTEM MEASUREMENT TABLES     2D  %FS: 29 1 %  Ao Diam: 3 18 cm  EDV(Teich): 92 03 ml  EF(Teich): 56 01 %  ESV(Teich): 40 48 ml  IVSd: 0 75 cm  LA Diam: 5 02 cm  LVIDd: 4 49 cm  LVIDs: 3 18 cm  LVPWd: 0 87 cm  RWT: 0 39  SV(Teich): 51 55 ml     CW  RAP: 0 mmHg  TR Vmax: 2 99 m/s  TR maxP 7 mmHg     PW  E' Sept: 0 05 m/s  E/E' Sept: 16 42  MV A Fer: 0 39 m/s  MV Dec [place]: 4 95 m/s2  MV DecT: 181 84 ms  MV E Fer: 0 9 m/s  MV E/A Ratio: 2 3  RVSP: 35 7 mmHg     [facility]     Prepared and electronically signed by  Dorie Patel MD  Signed 24-Oct-2019 12:35:41

## 2019-10-24 NOTE — ASSESSMENT & PLAN NOTE
· The patient was initiated on an IV cardizem drip/infusion today and was transferred to Level 1-Step down  · The patient is on the maximum dose of the IV cardizem drip/infusion at 15 mg/hr  · She was also initiated on metoprolol 5 mg IV every 4 hours  · Consult Cardiology

## 2019-10-24 NOTE — ASSESSMENT & PLAN NOTE
· Sepsis was present on admission and secondary to acute cystitis  · Continue IV levofloxacin and IV vancomycin   · Follow the procalcitonin level

## 2019-10-24 NOTE — PROGRESS NOTES
Progress Note - Case Rubio 1930, 80 y o  female MRN: [id number]    Unit/Bed#:  Encounter: [id number]    Primary Care Provider: Canelo Iyer DO   Date and time admitted to hospital: 10/22/2019  6:09 PM        * Acute systolic CHF (congestive heart failure) ([address] )  Assessment & Plan  Wt Readings from Last 3 Encounters:   10/24/19 55 4 kg (122 lb 2 2 oz)   19 56 7 kg (125 lb)   19 56 7 kg (125 lb)     · Utilize lasix 40 mg IV BID  · Consult Cardiology  · Daily weights  · Strict intake/output measurements    Echo complete with contrast if indicated   Status: Final result   PACS Images      Show images for Echo complete with contrast if indicated   Study Result     [address]  [place] [address]  [phone]     Transthoracic Echocardiogram  2D, M-mode, Doppler, and Color Doppler     Study date:  23-Oct-2019     Patient: Ivan Gaines  MR number: [id number]  Account number: [de-identified]  : 1930  Age: 80 years  Gender: Female  Status: Inpatient  Location: Echo lab  Height: 66 in  Weight: 122 lb  BP: 118/ 70 mmHg     Indications: Confusion     Diagnoses: R53 1 - Weakness     Sonographer:  Jame Angelo [facility], [facility]  Primary Physician:  Canelo Iyer DO  Referring Physician:  Juan Bell DO  Group:  [facility]  Interpreting Physician:  Reddy Viera MD     SUMMARY     PROCEDURE INFORMATION:  This was a technically difficult study      LEFT VENTRICLE:  Systolic function was moderately to markedly reduced by visual assessment  Ejection fraction was estimated in the range of 55 % to 65 % to be 35 %    There was moderate diffuse hypokinesis most pronounced at the basal to mid segments      RIGHT VENTRICLE:  The size was normal   Systolic function was moderately reduced      LEFT ATRIUM:  The atrium was moderately dilated      MITRAL VALVE:  There was moderate to severe regurgitation      AORTIC VALVE:  There was mild stenosis  There was moderate regurgitation      TRICUSPID VALVE:  There was moderate regurgitation      HISTORY: PRIOR HISTORY: altered mental status     PROCEDURE: The procedure was performed in the echo lab  This was a routine study  The transthoracic approach was used  The study included complete 2D imaging, M-mode, complete spectral Doppler, and color Doppler  The heart rate was 80 bpm,  at the start of the study  Echocardiographic views were limited due to poor patient compliance and combative patient  This was a technically difficult study      LEFT VENTRICLE: Size was normal  Systolic function was moderately to markedly reduced by visual assessment  Ejection fraction was estimated in the range of 55 % to 65 % to be 35 %  There was moderate diffuse hypokinesis most pronounced at  the basal to mid segments  Wall thickness was normal      RIGHT VENTRICLE: The size was normal  Systolic function was moderately reduced  Wall thickness was normal      LEFT ATRIUM: The atrium was moderately dilated      MITRAL VALVE: Valve structure was normal  There was normal leaflet separation  DOPPLER: The transmitral velocity was within the normal range  There was no evidence for stenosis  There was moderate to severe regurgitation      AORTIC VALVE: The valve was trileaflet  Leaflets exhibited mildly increased thickness, mild calcification, and mildly reduced cuspal separation  DOPPLER: Transaortic velocity was within the normal range  There was mild stenosis  There was  moderate regurgitation      TRICUSPID VALVE: The valve structure was normal  There was normal leaflet separation  DOPPLER: The transtricuspid velocity was within the normal range  There was no evidence for stenosis  There was moderate regurgitation  Estimated peak PA  pressure was 40 mmHg   The findings suggest mild pulmonary hypertension      SYSTEM MEASUREMENT TABLES     2D  %FS: 29 1 %  Ao Diam: 3 18 cm  EDV(Teich): 92 03 ml  EF(Teich): 56 01 %  ESV(Teich): 40 48 ml  IVSd: 0 75 cm  LA Diam: 5 02 cm  LVIDd: 4 49 cm  LVIDs: 3 18 cm  LVPWd: 0 87 cm  RWT: 0 39  SV(Teich): 51 55 ml     CW  RAP: 0 mmHg  TR Vmax: 2 99 m/s  TR maxP 7 mmHg     PW  E' Sept: 0 05 m/s  E/E' Sept: 16 42  MV A Fer: 0 39 m/s  MV Dec [place]: 4 95 m/s2  MV DecT: 181 84 ms  MV E Fer: 0 9 m/s  MV E/A Ratio: 2 3  RVSP: 35 7 mmHg     Intersocietal Commission Accredited Echocardiography Laboratory     Prepared and electronically signed by  Margarite Landau, MD  Signed 24-Oct-2019 12:35:41            Sepsis [facility])  Assessment & Plan  · Sepsis was present on admission and secondary to acute cystitis  · Continue IV levofloxacin and IV vancomycin   · Follow the procalcitonin level    Atrial fibrillation with rapid ventricular response ([facility] [address] )  Assessment & Plan  · The patient was initiated on an IV cardizem drip/infusion today and was transferred to Level 1-Step down  · The patient is on the maximum dose of the IV cardizem drip/infusion at 15 mg/hr  · She was also initiated on metoprolol 5 mg IV every 4 hours  · Consult Cardiology    Acute cystitis without hematuria  Assessment & Plan  · Await the urine culture results  · Continue IV levofloxacin and IV vancomycin in the setting of sepsis      Toxic encephalopathy  Assessment & Plan  · Present on admission  · Secondary to sepsis      Pulmonary hypertension ([address] )  Assessment & Plan  · Outpatient Pulmonology evaluation  · Outpatient sleep study    Mitral valve insufficiency  Assessment & Plan  · Outpatient Cardiology evaluation    Skin breakdown  Assessment & Plan  · Lower extremity wounds  · Consult the wound care team      Elevated troponin level  Assessment & Plan  · Likely non-MI troponin elevation secondary to sepsis and secondary to atrial fibrillation with rapid ventricular response        VTE Pharmacologic Prophylaxis:   Pharmacologic: Warfarin (Coumadin)  Mechanical VTE Prophylaxis in Place: Yes    Patient Centered Rounds: I have performed bedside rounds with nursing staff today  Time Spent for Care: 30 minutes  More than 50% of total time spent on counseling and coordination of care as described above  Current Length of Stay: 2 day(s)    Current Patient Status: Inpatient   Certification Statement: The patient will continue to require additional inpatient hospital stay due to the need for an IV cardizem drip/infusion, for IV antibiotic treatment, and for IV lasix treatment  Code Status: Level 3 - DNAR and DNI      Subjective: The patient was seen and examined  The patient developed rapid atrial fibrillation this morning  No chest pain  Objective:     Vitals:   Temp (24hrs), Av °F (36 7 °C), Min:96 8 °F (36 °C), Max:99 1 °F (37 3 °C)    Temp:  [96 8 °F (36 °C)-99 1 °F (37 3 °C)] 99 1 °F (37 3 °C)  HR:  [] 88  Resp:  [18-41] 32  BP: (103-155)/() 103/71  SpO2:  [95 %-100 %] 98 %  Body mass index is 19 71 kg/m²  Input and Output Summary (last 24 hours):        Intake/Output Summary (Last 24 hours) at 10/24/2019 1626  Last data filed at 10/24/2019 0233  Gross per 24 hour   Intake 180 ml   Output 990 ml   Net -810 ml       Physical Exam:     Physical Exam  General:  NAD, awake, confused, follows commands  HEENT:  NC/AT, mucous membranes moist  Neck:  Supple, No JVP elevation  CV:  + S1, + S2, Tachycardic, Irregularly irregular rhythm  Pulm:  Bibasilar crackles  Abd:  Soft, Non-tender, Non-distended  Ext:  No clubbing/cyanosis, + Edema of the bilateral lower extremities  Skin:  No rashes      Additional Data:    Labs:    Results from last 7 days   Lab Units 10/24/19  0639   WBC Thousand/uL 18 17*   HEMOGLOBIN g/dL 11 1*   HEMATOCRIT % 35 8   PLATELETS Thousands/uL 166   NEUTROS PCT % 88*   LYMPHS PCT % 4*   MONOS PCT % 7   EOS PCT % 0     Results from last 7 days   Lab Units 10/24/19  0639   SODIUM mmol/L 144   POTASSIUM mmol/L 3 1*   CHLORIDE mmol/L 110*   CO2 mmol/L 25   BUN mg/dL 24   CREATININE mg/dL 0 91   ANION GAP mmol/L 9   CALCIUM mg/dL 8 6   ALBUMIN g/dL 2 3*   TOTAL BILIRUBIN mg/dL 0 70   ALK PHOS U/L 94   ALT U/L 34   AST U/L 30   GLUCOSE RANDOM mg/dL 86     Results from last 7 days   Lab Units 10/24/19  0639   INR  2 77*             Results from last 7 days   Lab Units 10/24/19  0639 10/23/19  0559 10/22/19  2345 10/22/19  1933   LACTIC ACID mmol/L  --   --   --  2 0   PROCALCITONIN ng/ml 0 57* 0 53* 0 41*  --            * I Have Reviewed All Lab Data Listed Above  * Additional Pertinent Lab Tests Reviewed: [address] Admission Reviewed      Recent Cultures (last 7 days):     Results from last 7 days   Lab Units 10/23/19  0940 10/22/19  2333 10/22/19  1934   BLOOD CULTURE   --   --  No Growth at 24 hrs  No Growth at 24 hrs     INFLUENZA B PCR   --  Not Detected  --    RSV PCR   --  Not Detected  --    LEGIONELLA URINARY ANTIGEN  Negative  --   --        Last 24 Hours Medication List:     Current Facility-Administered Medications:  acetaminophen 650 mg Oral Q6H PRN Alycia Sheltonp, DO    Or        oxyCODONE-acetaminophen 1 tablet Oral Q6H PRN Alycia Lombardi, DO    aluminum-magnesium hydroxide-simethicone 30 mL Oral Q6H PRN Alycia Lombardi, DO    diltiazem 1-15 mg/hr Intravenous Titrated Alycia Lombardi, DO Last Rate: 10 mg/hr (10/24/19 1310)   docusate sodium 100 mg Oral BID PRN Alycia Lombardi, DO    furosemide 40 mg Intravenous BID (diuretic) Alycia Lombardi DO    [START ON 10/25/2019] heparin (porcine) 5,000 Units Subcutaneous Q8H [facility] Walter Bowers DO    levalbuterol 1 25 mg Nebulization Q6H PRN Alycia Lombardi, DO    levofloxacin 750 mg Intravenous Q48H Walter Bowers DO    [START ON 10/25/2019] lisinopril 10 mg Oral Daily Alycia Sheltonp, DO    magnesium oxide 400 mg Oral Daily Alycia Lombardi, DO    metoprolol 5 mg Intravenous Q4H Alyica Lombardi, DO    multivitamin-minerals 1 tablet Oral Daily Walter Bowers DO    ondansetron 4 mg Intravenous Q6H PRN Derek Palomares [place], DO    potassium chloride 40 mEq Oral BID [employer], DO    sodium chloride 3 mL Nebulization Q6H PRN [employer], DO    triamcinolone  Topical BID [employer], DO    vancomycin 20 mg/kg (Adjusted) Intravenous Q24H [employer], DO Last Rate: 1,000 mg (10/23/19 1803)   warfarin 3 mg Oral Daily (warfarin) Sebas Bailey, DO         Today, Patient Was Seen By: Sebas Bailey DO    ** Please Note: Dictation voice to text software may have been used in the creation of this document   **

## 2019-10-24 NOTE — ASSESSMENT & PLAN NOTE
· Await the urine culture results  · Continue IV levofloxacin and IV vancomycin in the setting of sepsis

## 2019-10-24 NOTE — PHYSICAL THERAPY NOTE
PT NOTE:             Attempted to see pt this date however pt still with tachycardia and on cardizem drip with ' to 140's at rest  Spoke with MD who requested to hold PT evaluation this date  Will continue to follow and reattempt tomorrow

## 2019-10-24 NOTE — NURSING NOTE
Patient adamantly refused assessment and will not allow RN to complete a physical assessment  Will attempt at a later time with second RN present

## 2019-10-25 NOTE — PHYSICAL THERAPY NOTE
PT NOTE:                 Attempted to see pt is am however pt still with elevated HR in the 120's and 130's with elevated BP  Will continue to follow and reattempt as able

## 2019-10-25 NOTE — PROGRESS NOTES
Progress Note - Scout Del Rosario 1930, 80 y o  female MRN: [id number]    Unit/Bed#:  Encounter: [id number]    Primary Care Provider: Myranda Coats DO   Date and time admitted to hospital: 10/22/2019  6:09 PM        * Acute systolic CHF (congestive heart failure) ([address] )  Assessment & Plan  Wt Readings from Last 3 Encounters:   10/25/19 55 1 kg (121 lb 7 6 oz)   19 56 7 kg (125 lb)   19 56 7 kg (125 lb)     · Continue lasix 40 mg IV BID  · Utilize metoprolol 25 mg PO TID per Cardiology  · The patient was seen in consultation by Cardiology  · Daily weights  · Strict intake/output measurements  · Monitor renal function closely while receiving IV Lasix treat    Echo complete with contrast if indicated   Status: Final result   PACS Images      Show images for Echo complete with contrast if indicated   Study Result     [address] [facility] [address] [facility] [address] [facility]  [phone]     Transthoracic Echocardiogram  2D, M-mode, Doppler, and Color Doppler     Study date:  23-Oct-2019     Patient: Jet Olmstead  MR number: [id number]  Account number: [de-identified]  : 1930  Age: 80 years  Gender: Female  Status: Inpatient  Location: Echo lab  Height: 66 in  Weight: 122 lb  BP: 118/ 70 mmHg     Indications: Confusion     Diagnoses: R53 1 - Weakness     Sonographer:  Shobha Dumont RDCS, CCT  Primary Physician:  Myranda Coats DO  Referring Physician:  Ivy Jacome DO  Group:  [facility]  Interpreting Physician:  Lupe Opitz, MD     SUMMARY     PROCEDURE INFORMATION:  This was a technically difficult study      LEFT VENTRICLE:  Systolic function was moderately to markedly reduced by visual assessment  Ejection fraction was estimated in the range of 55 % to 65 % to be 35 %    There was moderate diffuse hypokinesis most pronounced at the basal to mid segments      RIGHT VENTRICLE:  The size was normal   Systolic function was moderately reduced      LEFT ATRIUM:  The atrium was moderately dilated      MITRAL VALVE:  There was moderate to severe regurgitation      AORTIC VALVE:  There was mild stenosis  There was moderate regurgitation      TRICUSPID VALVE:  There was moderate regurgitation      HISTORY: PRIOR HISTORY: altered mental status     PROCEDURE: The procedure was performed in the echo lab  This was a routine study  The transthoracic approach was used  The study included complete 2D imaging, M-mode, complete spectral Doppler, and color Doppler  The heart rate was 80 bpm,  at the start of the study  Echocardiographic views were limited due to poor patient compliance and combative patient  This was a technically difficult study      LEFT VENTRICLE: Size was normal  Systolic function was moderately to markedly reduced by visual assessment  Ejection fraction was estimated in the range of 55 % to 65 % to be 35 %  There was moderate diffuse hypokinesis most pronounced at  the basal to mid segments  Wall thickness was normal      RIGHT VENTRICLE: The size was normal  Systolic function was moderately reduced  Wall thickness was normal      LEFT ATRIUM: The atrium was moderately dilated      MITRAL VALVE: Valve structure was normal  There was normal leaflet separation  DOPPLER: The transmitral velocity was within the normal range  There was no evidence for stenosis  There was moderate to severe regurgitation      AORTIC VALVE: The valve was trileaflet  Leaflets exhibited mildly increased thickness, mild calcification, and mildly reduced cuspal separation  DOPPLER: Transaortic velocity was within the normal range  There was mild stenosis  There was  moderate regurgitation      TRICUSPID VALVE: The valve structure was normal  There was normal leaflet separation  DOPPLER: The transtricuspid velocity was within the normal range  There was no evidence for stenosis  There was moderate regurgitation  Estimated peak PA  pressure was 40 mmHg   The findings suggest mild pulmonary hypertension      SYSTEM MEASUREMENT TABLES     2D  %FS: 29 1 %  Ao Diam: 3 18 cm  EDV(Teich): 92 03 ml  EF(Teich): 56 01 %  ESV(Teich): 40 48 ml  IVSd: 0 75 cm  LA Diam: 5 02 cm  LVIDd: 4 49 cm  LVIDs: 3 18 cm  LVPWd: 0 87 cm  RWT: 0 39  SV(Teich): 51 55 ml     CW  RAP: 0 mmHg  TR Vmax: 2 99 m/s  TR maxP 7 mmHg     PW  E' Sept: 0 05 m/s  E/E' Sept: 16 42  MV A Fer: 0 39 m/s  MV Dec [place]: 4 95 m/s2  MV DecT: 181 84 ms  MV E Fer: 0 9 m/s  MV E/A Ratio: 2 3  RVSP: 35 7 mmHg     [employer] Hospital[employer] Commission Accredited Echocardiography Laboratory     Prepared and electronically signed by  Jm Dickinson MD  Signed 24-Oct-2019 12:35:41            Sepsis [facility])  Assessment & Plan  · Sepsis was present on admission and secondary to a possible pneumonia  · Continue IV levofloxacin and IV vancomycin for now  · Follow the culture results  · Follow the procalcitonin level    Atrial fibrillation with rapid ventricular response ([address] )  Assessment & Plan  · The patient was initiated on an IV cardizem drip/infusion on 10/24/2019  · The patient was weaned off the IV cardizem drip/infusion on 10/24/2019  · The patient was seen in consultation by Cardiology  · Utilize metoprolol 25 mg PO TID per Cardiology  · Continue coumadin for a goal INR 2-3    Acute cystitis without hematuria  Assessment & Plan  · Await the urine culture results  · Continue IV levofloxacin and IV vancomycin in the setting of sepsis      Toxic encephalopathy  Assessment & Plan  · Present on admission  · Secondary to sepsis      Pulmonary hypertension ([address] )  Assessment & Plan  · Outpatient Pulmonology evaluation  · Outpatient sleep study    Mitral valve insufficiency  Assessment & Plan  · Outpatient Cardiology evaluation    Skin breakdown  Assessment & Plan  · Lower extremity wounds  · Consult the wound care team      Elevated troponin level  Assessment & Plan  · Likely non-MI troponin elevation secondary to sepsis and secondary to atrial fibrillation with rapid ventricular response        VTE Pharmacologic Prophylaxis:   Pharmacologic: Warfarin (Coumadin) for a goal INR of 2-3  Mechanical VTE Prophylaxis in Place: Yes    Patient Centered Rounds: I have performed bedside rounds with nursing staff today  Time Spent for Care: 30 minutes  More than 50% of total time spent on counseling and coordination of care as described above  Current Length of Stay: 3 day(s)    Current Patient Status: Inpatient   Certification Statement: The patient will continue to require additional inpatient hospital stay due to the need for IV lasix treatment, for IV antibiotic treatment, and for cardiac monitoring  Code Status: Level 3 - DNAR and DNI      Subjective: The patient was seen and examined  The patient continues to be confused this morning  She offers no specific complaints  Objective:     Vitals:   Temp (24hrs), Av °F (36 7 °C), Min:97 6 °F (36 4 °C), Max:99 1 °F (37 3 °C)    Temp:  [97 6 °F (36 4 °C)-99 1 °F (37 3 °C)] 97 9 °F (36 6 °C)  HR:  [] 138  Resp:  [32-52] 52  BP: (100-166)/() 166/96  SpO2:  [98 %-100 %] 99 %  Body mass index is 19 61 kg/m²  Input and Output Summary (last 24 hours):        Intake/Output Summary (Last 24 hours) at 10/25/2019 1408  Last data filed at 10/25/2019 1319  Gross per 24 hour   Intake 450 ml   Output 900 ml   Net -450 ml       Physical Exam:     Physical Exam  General:  NAD, awake, confused, follows commands  HEENT:  NC/AT, mucous membranes moist  Neck:  Supple, No JVP elevation  CV:  + S1, + S2, Intermittent tachycardia, Irregularly irregular rhythm  Pulm:  Bibasilar crackles  Abd:  Soft, Non-tender, Non-distended  Ext:  No clubbing/cyanosis/edema  Skin:  No rashes      Additional Data:    Labs:    Results from last 7 days   Lab Units 10/25/19  0612   WBC Thousand/uL 15 05*   HEMOGLOBIN g/dL 12 4   HEMATOCRIT % 39 5   PLATELETS Thousands/uL 192   NEUTROS PCT % 87*   LYMPHS PCT % 4*   MONOS PCT % 8   EOS PCT % 0     Results from last 7 days   Lab Units 10/25/19  0612   SODIUM mmol/L 144   POTASSIUM mmol/L 4 9   CHLORIDE mmol/L 113*   CO2 mmol/L 21   BUN mg/dL 35*   CREATININE mg/dL 1 20   ANION GAP mmol/L 10   CALCIUM mg/dL 8 8   ALBUMIN g/dL 2 5*   TOTAL BILIRUBIN mg/dL 0 60   ALK PHOS U/L 99   ALT U/L 48   AST U/L 35   GLUCOSE RANDOM mg/dL 118     Results from last 7 days   Lab Units 10/25/19  0612   INR  2 97*             Results from last 7 days   Lab Units 10/25/19  0612 10/24/19  0639 10/23/19  0559 10/22/19  2345 10/22/19  1933   LACTIC ACID mmol/L 1 4  --   --   --  2 0   PROCALCITONIN ng/ml 0 40* 0 57* 0 53* 0 41*  --            * I Have Reviewed All Lab Data Listed Above  * Additional Pertinent Lab Tests Reviewed: [address] Admission Reviewed      Recent Cultures (last 7 days):     Results from last 7 days   Lab Units 10/23/19  1807 10/23/19  0940 10/22/19  2333 10/22/19  1934   BLOOD CULTURE   --   --   --  No Growth at 48 hrs  No Growth at 48 hrs     URINE CULTURE  No Growth <1000 cfu/mL  --   --   --    INFLUENZA B PCR   --   --  Not Detected  --    RSV PCR   --   --  Not Detected  --    LEGIONELLA URINARY ANTIGEN   --  Negative  --   --        Last 24 Hours Medication List:     Current Facility-Administered Medications:  acetaminophen 650 mg Oral Q6H PRN Arna Fan, DO    Or        oxyCODONE-acetaminophen 1 tablet Oral Q6H PRN Arna Fan, DO    aluminum-magnesium hydroxide-simethicone 30 mL Oral Q6H PRN Arna Fan, DO    diltiazem 1-15 mg/hr Intravenous Titrated Arna Fan, DO Last Rate: Stopped (10/25/19 1100)   docusate sodium 100 mg Oral BID PRN Arna Fan, DO    furosemide 40 mg Intravenous BID (diuretic) Mayra Orozco PA-C    levalbuterol 0 63 mg Nebulization Q6H PRN Arna Fan, DO    levofloxacin 750 mg Intravenous Q48H Walter Bowers DO Last Rate: Stopped (10/24/19 2254)   lisinopril 10 mg Oral Daily Omar Marie DO magnesium oxide 400 mg Oral Daily Lucile Goltz, DO    metoprolol 5 mg Intravenous Q6H PRN Mayra Orozco PA-C    metoprolol tartrate 25 mg Oral TID Mayra Orozco PA-C    multivitamin-minerals 1 tablet Oral Daily Lucile Goltz, DO    ondansetron 4 mg Intravenous Q6H PRN Lucile Goltz, DO    triamcinolone  Topical BID Lucile Goltz, DO    vancomycin 20 mg/kg (Adjusted) Intravenous Q24H Lucile Goltz, DO Last Rate: Stopped (10/24/19 1800)   warfarin 2 mg Oral Daily (warfarin) Lucile Goltz, DO         Today, Patient Was Seen By: Lucile Goltz, DO    ** Please Note: Dictation voice to text software may have been used in the creation of this document   **

## 2019-10-25 NOTE — OCCUPATIONAL THERAPY NOTE
Occupational Therapy         Patient Name: Niranjan Rhodes  YON Date: 10/25/2019      Pt with continued elevated HR this AM; will complete evaluation when medically appropriate

## 2019-10-25 NOTE — CONSULTS
Progress Note - Wound   Yasmine Kevin 80 y o  female MRN: [id number]  Unit/Bed#:  Encounter: [id number]      Assessment:   Right lower leg abrasion  History of venous ulcerations  Previous patient of the [address] [place] [address] Road has remained ulcer free since discharge from outpatient care on 4/5/2018 per family at beside using Cetaphil and light ACE wraps  Bilateral DP and PT pulses palpable  Plan:   1  Right lower leg:  Dressing change every other day  Cleanse wound with NSS  Apply Nourishing Skin Cream to right lower extremity  Apply Dermagran gauze to open abrasion, cover with gauze  Secure with dinora  Secure with light 4 inch ACE wrap  2   Left lower leg:  Apply Nourishing Skin cream to left lower leg daily  Reapply light 4 inch ACE wrap  3   Elevate heels off bed  4   Follow up with wound care RN 1 week  Vitals: Blood pressure 166/96, pulse (!) 138, temperature 97 9 °F (36 6 °C), temperature source Temporal, resp  rate (!) 52, height 5' 6" (1 676 m), weight 55 1 kg (121 lb 7 6 oz), SpO2 99 %  ,Body mass index is 19 61 kg/m²  Wound 10/25/19 Traumatic Abrasion(s) Leg Anterior;Right (Active)   Wound Description Epithelialization;Granulation tissue 10/25/2019  2:41 PM   Cristiana-wound Assessment Purple;Yellow-brown; Other (Comment) 10/25/2019  2:41 PM   Wound Length (cm) 0 9 cm 10/25/2019  2:41 PM   Wound Width (cm) 0 2 cm 10/25/2019  2:41 PM   Wound Depth (cm) 0 1 10/25/2019  2:41 PM   Calculated Wound Area (cm^2) 0 18 cm^2 10/25/2019  2:41 PM   Calculated Wound Volume (cm^3) 0 02 cm^3 10/25/2019  2:41 PM   Drainage Amount Scant 10/25/2019  2:41 PM   Drainage Description Serous 10/25/2019  2:41 PM   Non-staged Wound Description Full thickness 10/25/2019  2:41 PM   Treatments Cleansed 10/25/2019  2:41 PM   Dressing Dermagran gauze 10/25/2019  2:41 PM   Wound packed?  No 10/25/2019  2:41 PM   Dressing Changed New 10/25/2019  2:41 PM   Patient Tolerance Tolerated well 10/25/2019  2:41 PM Dressing Status Intact 10/25/2019  2:41 PM     Wound care plan of care reviewed with family at bedside  Family in agreement with plan  Orders reviewed with bedside RN  Milo Chu RN   CWOCN  10/25/2019 14:51

## 2019-10-25 NOTE — ASSESSMENT & PLAN NOTE
Wt Readings from Last 3 Encounters:   10/25/19 55 1 kg (121 lb 7 6 oz)   19 56 7 kg (125 lb)   19 56 7 kg (125 lb)     · Continue lasix 40 mg IV BID  · Utilize metoprolol 25 mg PO TID per Cardiology  · The patient was seen in consultation by Cardiology  · Daily weights  · Strict intake/output measurements  · Monitor renal function closely while receiving IV Lasix treat    Echo complete with contrast if indicated   Status: Final result   PACS Images      Show images for Echo complete with contrast if indicated   Study Result     [address]  [place] [address], [place] [address]  [phone]     Transthoracic Echocardiogram  2D, M-mode, Doppler, and Color Doppler     Study date:  23-Oct-2019     Patient: Eugene Carranza  MR number: [id number]  Account number: [de-identified]  : 1930  Age: 80 years  Gender: Female  Status: Inpatient  Location: Echo lab  Height: 66 in  Weight: 122 lb  BP: 118/ 70 mmHg     Indications: Confusion     Diagnoses: R53 1 - Weakness     Sonographer:  Wilfrido Steward RDCS, CCT  Primary Physician:  Everett Chu DO  Referring Physician:  Samanta Garcia DO  Group:  [facility]  Interpreting Physician:  Suzanne Miguel MD     SUMMARY     PROCEDURE INFORMATION:  This was a technically difficult study      LEFT VENTRICLE:  Systolic function was moderately to markedly reduced by visual assessment  Ejection fraction was estimated in the range of 55 % to 65 % to be 35 %  There was moderate diffuse hypokinesis most pronounced at the basal to mid segments      RIGHT VENTRICLE:  The size was normal   Systolic function was moderately reduced      LEFT ATRIUM:  The atrium was moderately dilated      MITRAL VALVE:  There was moderate to severe regurgitation      AORTIC VALVE:  There was mild stenosis    There was moderate regurgitation      TRICUSPID VALVE:  There was moderate regurgitation      HISTORY: PRIOR HISTORY: altered mental status     PROCEDURE: The procedure was performed in the echo lab  This was a routine study  The transthoracic approach was used  The study included complete 2D imaging, M-mode, complete spectral Doppler, and color Doppler  The heart rate was 80 bpm,  at the start of the study  Echocardiographic views were limited due to poor patient compliance and combative patient  This was a technically difficult study      LEFT VENTRICLE: Size was normal  Systolic function was moderately to markedly reduced by visual assessment  Ejection fraction was estimated in the range of 55 % to 65 % to be 35 %  There was moderate diffuse hypokinesis most pronounced at  the basal to mid segments  Wall thickness was normal      RIGHT VENTRICLE: The size was normal  Systolic function was moderately reduced  Wall thickness was normal      LEFT ATRIUM: The atrium was moderately dilated      MITRAL VALVE: Valve structure was normal  There was normal leaflet separation  DOPPLER: The transmitral velocity was within the normal range  There was no evidence for stenosis  There was moderate to severe regurgitation      AORTIC VALVE: The valve was trileaflet  Leaflets exhibited mildly increased thickness, mild calcification, and mildly reduced cuspal separation  DOPPLER: Transaortic velocity was within the normal range  There was mild stenosis  There was  moderate regurgitation      TRICUSPID VALVE: The valve structure was normal  There was normal leaflet separation  DOPPLER: The transtricuspid velocity was within the normal range  There was no evidence for stenosis  There was moderate regurgitation  Estimated peak PA  pressure was 40 mmHg   The findings suggest mild pulmonary hypertension      SYSTEM MEASUREMENT TABLES     2D  %FS: 29 1 %  Ao Diam: 3 18 cm  EDV(Teich): 92 03 ml  EF(Teich): 56 01 %  ESV(Teich): 40 48 ml  IVSd: 0 75 cm  LA Diam: 5 02 cm  LVIDd: 4 49 cm  LVIDs: 3 18 cm  LVPWd: 0 87 cm  RWT: 0 39  SV(Teich): 51 55 ml     CW  RAP: 0 mmHg  TR Vmax: 2 99 m/s  TR maxP 7 mmHg     PW  E' Sept: 0 05 m/s  E/E' Sept: 16 42  MV A Fer: 0 39 m/s  MV Dec [place]: 4 95 m/s2  MV DecT: 181 84 ms  MV E Fer: 0 9 m/s  MV E/A Ratio: 2 3  RVSP: 35 7 mmHg     [facility]     Prepared and electronically signed by  Mindy Martinez MD  Signed 24-Oct-2019 12:35:41

## 2019-10-25 NOTE — CONSULTS
Consultation - Cardiology   Teena Adair 80 y o  female MRN: [id number]  Unit/Bed#:  Encounter: [id number]    Consults      Physician Requesting Consult: Rosa Maza DO  Reason for Consult / Principal Problem: acute systolic congestive heart failure, atrial fibrillation with RVR    Assessment/Plan:   1  Sepsis    - secondary to UTI   - continue antibiotics per primary team    2  Atrial fibrillation with rapid ventricular response   - heart rate currently , improved with IV lopressor   - no need for repeat cardizem drip at this time    - avoid cardizem with low ejection fraction    - will restart PO metoprolol at 25 mg TID   - on anticoagulation with coumadin   - prn lopressor 5 mg IV for HR >120    3  Acute on chronic systolic and diastolic congestive heart failure   - CXR today shows worsening vascular congestion   - echo yesterday showed EF of 35%   - agree with lasix 40 mg IV BID, can increase to TID if needed   - continue metoprolol and lisinopril for GDMT   - low sodium diet    4  Cardiomyopathy   - EF of 35%   - likely in the setting of sepsis/acute illness   - continue metoprolol and lisinopril     5  Hypertension   - blood pressures have been variable   - expect to improve with diuresis/resolution of infection    History of Present Illness   HPI: Teena Adair is a 80y o  year old female who has a history of chronic atrial fibrillation on coumadin, hypertension, mitral regurgitation, and chronic diastolic congestive heart failure who follows with Dr Saranya Ramírez  The patient presented to the emergency department on 10/22 by her family for the evaluation of altered mental status  She is a poor historian is currently confused and agitated and thus history is taken completely from chart review  It appears initially she was noted to be septic secondary to pneumonia and a UTI and has been receiving IV antibiotics    Yesterday the patient developed atrial fibrillation with RVR and was initiated on a Cardizem drip with improvement of her heart rate and the drip was stopped  She was also thought to be volume overloaded and was given IV Lasix  This morning her heart rate elevated into the 140's again and thus Cardiology was consulted for further management  With the lopressor injection this morning her heart rate is now improved to upper 90's-110  She is agitated and I am unable to perform review of systems  Review of Systems   Unable to perform ROS: mental status change     Historical Information   Past Medical History:   Diagnosis Date    Acute renal failure superimposed on stage 3 chronic kidney disease ([facility] ) 10/23/2019    Allergic drug reaction     Cellulitis, leg     Chronic atrial fibrillation     Chronic diastolic congestive heart failure ([place] [address] ) 7/24/2018    Dilated cardiomyopathy ([place] [address] )     Essential (primary) hypertension     Heart failure, unspecified ([place] [address] )     History of echocardiogram 10/28/2015    EF 0 55 (55%), Normal LVSF  Mild to mod MR 2+  Left atrial enlargement   History of echocardiogram 01/03/2018    2-D w/ CFD; EF 0 55 (55%), Normal left vent systolic function  Mod mitral regurg  Left atrial enlargement      History of echocardiogram 06/14/2017    2-D w/CFD    Long term (current) use of anticoagulants     Longstanding persistent atrial fibrillation 7/2/2012    On warfarin    Lymphedema, not elsewhere classified     Mitral regurgitation     Pancreatitis     Rheumatic mitral insufficiency     Rheumatoid arthritis (HCC)     Stasis dermatitis      Past Surgical History:   Procedure Laterality Date    CHOLECYSTECTOMY      followed by procedure to remove gallstones in a pouch outside of gallbladder    TOOTH EXTRACTION       Social History     Substance and Sexual Activity   Alcohol Use Not Currently    Alcohol/week: 0 0 standard drinks    Frequency: Never    Binge frequency: Never     Social History     Substance and Sexual Activity   Drug Use No Social History     Tobacco Use   Smoking Status Never Smoker   Smokeless Tobacco Never Used     Family History: non-contributory    Meds/Allergies   all current active meds have been reviewed    diltiazem 1-15 mg/hr       Allergies   Allergen Reactions    Azithromycin Rash    Cephalexin Itching and Rash     Reaction Date: 29Feb2012;     Latex Rash    Mupirocin Rash       Objective   Vitals: Blood pressure 166/96, pulse (!) 138, temperature 97 9 °F (36 6 °C), temperature source Temporal, resp  rate (!) 52, height 5' 6" (1 676 m), weight 55 1 kg (121 lb 7 6 oz), SpO2 99 %  , Body mass index is 19 61 kg/m² , Orthostatic Blood Pressures      Most Recent Value   Blood Pressure  166/96 filed at 10/25/2019 0945   Patient Position - Orthostatic VS  Lying filed at 10/25/2019 1224        Systolic (12NQX), XKP:182 , Min:100 , MUQ:908     Diastolic (61HVS), PLD:50, Min:70, Max:114      Intake/Output Summary (Last 24 hours) at 10/25/2019 1042  Last data filed at 10/25/2019 0945  Gross per 24 hour   Intake 690 ml   Output 725 ml   Net -35 ml       Weight (last 2 days)     Date/Time   Weight    10/25/19 0600   55 1 (121 47)    10/24/19 2000       Comment rows:    OBSERV: Patient resting in reported comfort and requests to be left alone  at 10/24/19 2000    10/24/19 0600   55 4 (122 14)    10/23/19 1608   55 4 (122 14)    10/23/19 0600   55 4 (122 14)              Invasive Devices     Peripheral Intravenous Line            Peripheral IV 10/22/19 Right Antecubital 2 days                  Physical Exam   Cardiovascular: An irregularly irregular rhythm present  Tachycardia present  Pulses:       Radial pulses are 2+ on the right side, and 2+ on the left side  Pulmonary/Chest: She has wheezes in the left lower field  She has rales in the right lower field and the left lower field  Musculoskeletal: She exhibits no edema  Psychiatric: She is agitated and aggressive             Laboratory Results:  Results from last 7 days Lab Units 10/24/19  0639 10/23/19  0223 10/22/19  2345   TROPONIN I ng/mL 0 14* 0 16* 0 16*       CBC with diff: Results from last 7 days   Lab Units 10/25/19  0612 10/24/19  0639 10/23/19  0559 10/23/19  0223 10/22/19  1933   WBC Thousand/uL 15 05* 18 17* 20 80*  --  23 44*   HEMOGLOBIN g/dL 12 4 11 1* 12 0  --  13 4   HEMATOCRIT % 39 5 35 8 38 5  --  43 0   MCV fL 99* 99* 99*  --  98   PLATELETS Thousands/uL 192 166 162 170 194   MCH pg 31 0 30 6 30 7  --  30 5   MCHC g/dL 31 4 31 0* 31 2*  --  31 2*   RDW % 13 8 13 7 13 8  --  13 8   MPV fL 11 4 12 0 12 0 11 2 11 3   NRBC AUTO /100 WBCs 0 0 0  --  0         CMP:  Results from last 7 days   Lab Units 10/25/19  0612 10/24/19  0639 10/23/19  0559 10/22/19  1934   POTASSIUM mmol/L 4 9 3 1* 4 1 3 7   CHLORIDE mmol/L 113* 110* 108 104   CO2 mmol/L 21 25 22 27   BUN mg/dL 35* 24 20 21   CREATININE mg/dL 1 20 0 91 0 91 1 05   CALCIUM mg/dL 8 8 8 6 8 6 8 9   AST U/L 35 30 31 42   ALT U/L 48 34 33 46   ALK PHOS U/L 99 94 93 117*   EGFR ml/min/1 73sq m 40 56 56 47         BMP:  Results from last 7 days   Lab Units 10/25/19  0612 10/24/19  0639 10/23/19  0559 10/22/19  1934   POTASSIUM mmol/L 4 9 3 1* 4 1 3 7   CHLORIDE mmol/L 113* 110* 108 104   CO2 mmol/L 21 25 22 27   BUN mg/dL 35* 24 20 21   CREATININE mg/dL 1 20 0 91 0 91 1 05   CALCIUM mg/dL 8 8 8 6 8 6 8 9       BNP:  No results for input(s): BNP in the last 72 hours      Magnesium:   Results from last 7 days   Lab Units 10/25/19  0612 10/23/19  0559 10/22/19  1934   MAGNESIUM mg/dL 2 3 1 9 1 9       Coags:   Results from last 7 days   Lab Units 10/25/19  0612 10/24/19  0639 10/22/19  1934 10/22/19  1115   PTT seconds  --   --  46*  --    INR  2 97* 2 77* 1 89* 2 00*       TSH:       Hemoglobin A1C       Lipid Profile:         Cardiac testing:   Results for orders placed during the hospital encounter of 10/22/19   Echo complete with contrast if indicated    Narrative [address] [facility] [address] [facility] [address]  [place], [place] [address]  [phone]    Transthoracic Echocardiogram  2D, M-mode, Doppler, and Color Doppler    Study date:  23-Oct-2019    Patient: Jaquelin Chairez  MR number: [id number]  Account number: [de-identified]  : 1930  Age: 80 years  Gender: Female  Status: Inpatient  Location: Echo lab  Height: 66 in  Weight: 122 lb  BP: 118/ 70 mmHg    Indications: Confusion    Diagnoses: R53 1 - Weakness    Sonographer:  Lindsey Dunn RDCS, CCT  Primary Physician:  Clyde Aguilar DO  Referring Physician:  Tarah Bocanegra DO  Group:  [facility]  Interpreting Physician:  Francisco Kinsey MD    SUMMARY    PROCEDURE INFORMATION:  This was a technically difficult study  LEFT VENTRICLE:  Systolic function was moderately to markedly reduced by visual assessment  Ejection fraction was estimated in the range of 55 % to 65 % to be 35 %  There was moderate diffuse hypokinesis most pronounced at the basal to mid segments  RIGHT VENTRICLE:  The size was normal   Systolic function was moderately reduced  LEFT ATRIUM:  The atrium was moderately dilated  MITRAL VALVE:  There was moderate to severe regurgitation  AORTIC VALVE:  There was mild stenosis  There was moderate regurgitation  TRICUSPID VALVE:  There was moderate regurgitation  HISTORY: PRIOR HISTORY: altered mental status    PROCEDURE: The procedure was performed in the echo lab  This was a routine study  The transthoracic approach was used  The study included complete 2D imaging, M-mode, complete spectral Doppler, and color Doppler  The heart rate was 80 bpm,  at the start of the study  Echocardiographic views were limited due to poor patient compliance and combative patient  This was a technically difficult study  LEFT VENTRICLE: Size was normal  Systolic function was moderately to markedly reduced by visual assessment  Ejection fraction was estimated in the range of 55 % to 65 % to be 35 %   There was moderate diffuse hypokinesis most pronounced at  the basal to mid segments  Wall thickness was normal     RIGHT VENTRICLE: The size was normal  Systolic function was moderately reduced  Wall thickness was normal     LEFT ATRIUM: The atrium was moderately dilated  MITRAL VALVE: Valve structure was normal  There was normal leaflet separation  DOPPLER: The transmitral velocity was within the normal range  There was no evidence for stenosis  There was moderate to severe regurgitation  AORTIC VALVE: The valve was trileaflet  Leaflets exhibited mildly increased thickness, mild calcification, and mildly reduced cuspal separation  DOPPLER: Transaortic velocity was within the normal range  There was mild stenosis  There was  moderate regurgitation  TRICUSPID VALVE: The valve structure was normal  There was normal leaflet separation  DOPPLER: The transtricuspid velocity was within the normal range  There was no evidence for stenosis  There was moderate regurgitation  Estimated peak PA  pressure was 40 mmHg  The findings suggest mild pulmonary hypertension  SYSTEM MEASUREMENT TABLES    2D  %FS: 29 1 %  Ao Diam: 3 18 cm  EDV(Teich): 92 03 ml  EF(Teich): 56 01 %  ESV(Teich): 40 48 ml  IVSd: 0 75 cm  LA Diam: 5 02 cm  LVIDd: 4 49 cm  LVIDs: 3 18 cm  LVPWd: 0 87 cm  RWT: 0 39  SV(Teich): 51 55 ml    CW  RAP: 0 mmHg  TR Vmax: 2 99 m/s  TR maxP 7 mmHg    PW  E' Sept: 0 05 m/s  E/E' Sept: 16 42  MV A Fer: 0 39 m/s  MV Dec [place]: 4 95 m/s2  MV DecT: 181 84 ms  MV E Fer: 0 9 m/s  MV E/A Ratio: 2 3  RVSP: 35 7 mmHg    [facility] Accredited Echocardiography Laboratory    Prepared and electronically signed by    Brayden Kwan MD  Signed 24-Oct-2019 12:35:41       No results found for this or any previous visit  No results found for this or any previous visit  No results found for this or any previous visit  Imaging: I have personally reviewed pertinent reports      Xr Chest Portable    Result Date: 10/24/2019  Narrative: CHEST INDICATION:   Shortness of breath  COMPARISON:  October 23 EXAM PERFORMED/VIEWS:  XR CHEST PORTABLE Single image FINDINGS:  Diminished lung volumes  Improved infiltrate/atelectasis in the right lower lung  Persistent cardiomegaly and mild pulmonary edema  Left hemidiaphragm remains obscured  No pneumothorax or free air  Osseous structures appear within normal limits for patient age  Impression: Improving pulmonary edema  Improving right lower lung infiltrate/atelectasis  Workstation performed: [id number]     Xr Chest Portable    Result Date: 10/23/2019  Narrative: CHEST INDICATION:  Shortness of breath  COMPARISON:  10/22/2019 EXAM PERFORMED/VIEWS:  XR CHEST PORTABLE FINDINGS:  Study is limited by patient rotation to the left  Cardiomediastinal silhouette appears grossly stable  There is hazy density in the left retrocardiac region  Although this could be accentuated by positioning, possibility of retrocardiac infiltrate cannot be excluded  Streaky density in the right lower lung zone as before may reflect atelectasis or scarring  No pneumothorax  Cannot exclude pleural effusions  Osseous structures appear within normal limits for patient age  Impression: VERY LIMITED STUDY  Left cardiac opacity may be artifactual, cannot exclude infiltrate  Recommend repeat frontal and lateral views with better positioning  The study was marked in [facility] for immediate notification  Workstation performed: [id number]     Xr Chest Pa & Lateral    Result Date: 10/23/2019  Narrative: CHEST INDICATION:   Shortness of breath  Confusion  COMPARISON:  10/23/2019 at 0857 hours EXAM PERFORMED/VIEWS:  XR CHEST PA & LATERAL FINDINGS: Heart shadow is enlarged but unchanged from prior exam  Frontal and lateral views demonstrate that there is no evidence of focal consolidation in the left retrocardiac region  There is also no significant effusion    However, pulmonary vasculature is prominent and there is subsegmental atelectasis in the right lower lung field  There is no pneumothorax  Osseous structures appear within normal limits for patient age  Impression: There is no evidence of focal airspace consolidation  Findings do suggest pulmonary vascular congestion with right lower lung field subsegmental atelectasis Workstation performed: [id number]     X-ray Chest 2 Views    Result Date: 10/23/2019  Narrative: CHEST INDICATION:   chest pain  COMPARISON:  None EXAM PERFORMED/VIEWS:  XR CHEST PA & LATERAL FINDINGS: Cardiomediastinal silhouette appears unremarkable  Scarring/atelectasis at the right lower lung field  Left lung base is obscured by cardiac opacity  No pneumothorax or pleural effusion  Osseous structures appear within normal limits for patient age  Prominent kyphosis again noted     Impression: Scarring/atelectasis at the right lower lung field otherwise no acute cardiopulmonary disease  Workstation performed: [id number]     Ct Head Without Contrast    Result Date: 10/22/2019  Narrative: CT BRAIN - WITHOUT CONTRAST INDICATION:   altered mental status ; r/o mass  COMPARISON:  None  TECHNIQUE:  CT examination of the brain was performed  In addition to axial images, coronal 2D reformatted images were created and submitted for interpretation  Radiation dose length product (DLP) for this visit:  905 95 mGy-cm   This examination, like all CT scans performed in the [facility], was performed utilizing techniques to minimize radiation dose exposure, including the use of iterative  reconstruction and automated exposure control  IMAGE QUALITY:  Diagnostic  FINDINGS: PARENCHYMA: Decreased attenuation is noted in periventricular and subcortical white matter demonstrating an appearance that is statistically most likely to represent mild microangiopathic change  Chronic lacunar infarction(s) are noted in left basal ganglia  No CT signs of acute infarction    No intracranial mass, mass effect or midline shift  No acute parenchymal hemorrhage  VENTRICLES AND EXTRA-AXIAL SPACES:  Normal for the patient's age  VISUALIZED ORBITS AND PARANASAL SINUSES:  Unremarkable  CALVARIUM AND EXTRACRANIAL SOFT TISSUES:  Normal      Impression: No acute intracranial abnormality  Workstation performed: [id number]     Ct Chest Wo Contrast    Result Date: 10/24/2019  Narrative: CT CHEST WITHOUT IV CONTRAST INDICATION:   Shortness of breath  Tachycardia  COMPARISON:  None  TECHNIQUE: CT examination of the chest was performed without intravenous contrast   Axial, sagittal, and coronal 2D reformatted images were created from the source data and submitted for interpretation  Radiation dose length product (DLP) for this visit:  223 42 mGy-cm   This examination, like all CT scans performed in the [facility], was performed utilizing techniques to minimize radiation dose exposure, including the use of iterative  reconstruction and automated exposure control  FINDINGS: Image detail moderately limited due to free breathing respiratory motion throughout the exam  LUNGS:  Limited by motion artifact  Bilateral dependent opacities in both lower lobes with appearance favoring atelectasis  Mild component of groundglass opacity in the lower lobes as well which may reflect a component of edema  PLEURA:  Mild dependent pleural fluid bilaterally HEART/GREAT VESSELS:  Moderate to severe global cardiomegaly  Moderate central pulmonary vascular distention  Ectasia and tortuosity of the aorta  Ascending aorta 4 1 cm  Main pulmonary outflow tract 4 0 cm  MEDIASTINUM AND EDUARD:  Unremarkable  CHEST WALL AND LOWER NECK:   Unremarkable  VISUALIZED STRUCTURES IN THE UPPER ABDOMEN:  Unremarkable  OSSEOUS STRUCTURES:  Exaggerated thoracic kyphosis  Osteopenia  Central severe compression fracture at T12  Impression: Severe global cardiomegaly   Central pulmonary vascular congestion and small bilateral dependent pleural effusion  Dependent opacities favoring atelectasis and probable mild component of interstitial edema  Specificity segmentally limited due to free breathing respiratory motion  Workstation performed: [id number]     Vas Lower Limb Venous Duplex Study, Unilateral/limited    Result Date: 10/24/2019  Narrative:  [facility] REPORT CLINICAL: Indications: Left leg edema noted by physician  Unable to obtain history from patient due to dementia and apparent expressive aphasia  Her chart indicates a Coumadin regimen and history of a-fib  Exam is limited by wound bandaging of the calves  Risk Factors The patient has no history of DVT  FINDINGS:  Segment  Right            Left              Impression       Impression       CFV      Normal (Patent)  Normal (Patent)     CONCLUSION: Impression: RIGHT LOWER LIMB LIMITED: Evaluation shows no evidence of thrombus in the common femoral vein  Doppler evaluation shows a normal response to augmentation maneuvers  LEFT LOWER LIMB: No evidence of acute or chronic deep vein thrombosis  No evidence of superficial thrombophlebitis noted  Doppler evaluation shows a normal response to augmentation maneuvers  Popliteal and posterior tibial arterial Doppler waveforms are biphasic  Evaluation of the calf veins limited to the proximal portions due to wound bandaging  Technically difficult/limited study due to patient confusion and bandaging    SIGNATURE: Electronically Signed by: Syed Soni MD, RPVI on 2019-10-24 12:33:16 AM      EKG reviewed personally:   Telemetry reviewed personally: currently atrial fibrillation with RVR    Assessment:  Principal Problem:    Acute systolic CHF (congestive heart failure) ([facility])  Active Problems:    Elevated troponin level    Toxic encephalopathy    Acute cystitis without hematuria    Atrial fibrillation with rapid ventricular response ([facility])    Skin breakdown    Mitral valve insufficiency    Pulmonary hypertension ([address] )    Sepsis [facility])    Code Status: Level 3 - DNAR and DNI

## 2019-10-25 NOTE — ASSESSMENT & PLAN NOTE
· Sepsis was present on admission and secondary to a possible pneumonia  · Continue IV levofloxacin and IV vancomycin for now  · Follow the culture results  · Follow the procalcitonin level

## 2019-10-25 NOTE — PROGRESS NOTES
Vancomycin IV Pharmacy-to-Dose Consultation    Keith Arias is a 80 y o  female who is currently receiving Vancomycin IV with management by the Pharmacy Consult service  Assessment/Plan:  The patient was reviewed  Renal function is stable and no signs or symptoms of nephrotoxicity and/or infusion reactions were documented in the chart  Based on todays assessment, continue current vancomycin (day # 3) dosing of 1000mg q24h, with a plan for trough to be drawn at 1630 on 10/26/19  We will continue to follow the patients culture results and clinical progress daily      Viky Adamson, Pharmacist

## 2019-10-25 NOTE — ASSESSMENT & PLAN NOTE
Sent previous message to a teammate in clinic now.  Irma Yu RN    · Await the urine culture results  · Continue IV levofloxacin and IV vancomycin in the setting of sepsis

## 2019-10-25 NOTE — ASSESSMENT & PLAN NOTE
· The patient was initiated on an IV cardizem drip/infusion on 10/24/2019  · The patient was weaned off the IV cardizem drip/infusion on 10/24/2019  · The patient was seen in consultation by Cardiology  · Utilize metoprolol 25 mg PO TID per Cardiology  · Continue coumadin for a goal INR 2-3

## 2019-10-25 NOTE — DISCHARGE INSTR - OTHER ORDERS
Right lower extremity dressing change every other day  Cleanse with normal saline solution  Apply Dermagran gauze to open abrasion  Cover with 4x4 plain gauze  Secure with dinora and light ACE wrap

## 2019-10-26 PROBLEM — E87.0 HYPERNATREMIA: Status: ACTIVE | Noted: 2019-01-01

## 2019-10-26 PROBLEM — R71.8 ELEVATED MCV: Status: ACTIVE | Noted: 2019-01-01

## 2019-10-26 PROBLEM — I49.3 VENTRICULAR ECTOPY: Status: ACTIVE | Noted: 2019-01-01

## 2019-10-26 NOTE — PLAN OF CARE
Problem: SLP ADULT - SWALLOWING, IMPAIRED  Goal: Initial SLP swallow eval performed  Outcome: Completed    Rahul Espinoza MA, [facility]/SLP  [id number]  [email or web address]  Available via [employer] text

## 2019-10-26 NOTE — PROGRESS NOTES
Patient's telemetry alarming v tach; she was assessed and asymptomatic  Event was printed from monitor and reported to [employer], MINI  Counted as 24 beats

## 2019-10-26 NOTE — ASSESSMENT & PLAN NOTE
Keep the patient's magnesium at 2 mg/dl and above in the setting of ventricular ectopy  Keep the patient's potassium at 4 mmol/L and above in the setting of ventricular ectopy  Increase metoprolol to 50 mg PO every 12 hours

## 2019-10-26 NOTE — SPEECH THERAPY NOTE
Speech-Language Pathology Bedside Swallow Evaluation      Patient Name: Amanda Su    YPOFE'O Date: 10/26/2019     Problem List  Principal Problem:    Acute systolic CHF (congestive heart failure) ([facility])  Active Problems:    Elevated troponin level    Toxic encephalopathy    Atrial fibrillation with rapid ventricular response ([facility])    Skin breakdown    Mitral valve insufficiency    Pulmonary hypertension ([facility] )    Sepsis ([facility])    Elevated MCV    Hypernatremia    Ventricular ectopy      Past Medical History  Past Medical History:   Diagnosis Date    Acute renal failure superimposed on stage 3 chronic kidney disease ([facility] ) 10/23/2019    Allergic drug reaction     Cellulitis, leg     Chronic atrial fibrillation     Chronic diastolic congestive heart failure ([facility] ) 7/24/2018    Dilated cardiomyopathy ([place] [address] )     Essential (primary) hypertension     Heart failure, unspecified ([place] [address] )     History of echocardiogram 10/28/2015    EF 0 55 (55%), Normal LVSF  Mild to mod MR 2+  Left atrial enlargement   History of echocardiogram 01/03/2018    2-D w/ CFD; EF 0 55 (55%), Normal left vent systolic function  Mod mitral regurg  Left atrial enlargement   History of echocardiogram 06/14/2017    2-D w/CFD    Long term (current) use of anticoagulants     Longstanding persistent atrial fibrillation 7/2/2012    On warfarin    Lymphedema, not elsewhere classified     Mitral regurgitation     Pancreatitis     Rheumatic mitral insufficiency     Rheumatoid arthritis ([facility] )     Stasis dermatitis        Past Surgical History  Past Surgical History:   Procedure Laterality Date    CHOLECYSTECTOMY      followed by procedure to remove gallstones in a pouch outside of gallbladder    TOOTH EXTRACTION         Summary   Pt presented with  Limited cooperation for full assessment today  Swallow appears functional for puree and thin   No active  manipulation of soft-solids, but functional for breakdown and transfer w/ liquids assisting  Has tolerated soft  "finger food type items" per , for nursing staff  Will continue diet as soft and provide further  recommendation/modifications from there as needed  Risk/s for Aspiration:  confusion    Recommended Diet: soft/level 3 diet and thin liquids      Staff supervision/assistance as needed  Recommended Form of Meds: whole with puree   Aspiration precautions and swallowing strategies: upright posture, only feed when fully alert and small bites/sips    Current Medical Status per admitting H&P:  Pt is a 80 y o  female who presented to [facility] on 10/22/19 with rapid development of confusion, generalized weakness, and lethargy since yesterday  Patient lives with 2 adult sons and adult daughter also assists regularly in her care  They report mild cognitive impairment at baseline, but patient has become completely disoriented and has generally stopped answering questions at this point  She was able to take medications this morning, but appetite has been poor today  No other significant change in health recently  Pt was transferred to ICU  W/ A-fib  Swallow consult requested and completed today  Nursing reports pt has tolerated regular finger food type foods and eats better when family is present  This am is is OOB in chair, requesting to go to sleep  Sates"I can't swallow"     Current Precautions:  Fall  Aspiration      Past medical history:   Please see H&P for details    Special Studies:  Xray chest 10/25/19:Stable chest with persistent left lower lobe pleural parenchymal density    CT chest wo contrast 10/24/19:Severe global cardiomegaly      Central pulmonary vascular congestion and small bilateral dependent pleural effusion      Dependent opacities favoring atelectasis and probable mild component of interstitial edema  Specificity segmentally limited due to free breathing respiratory motion      Social/Education/Vocational Hx:  Pt lives with family    Swallow Information Current Risks for Dysphagia & Aspiration: AMS  Current Symptoms/Concerns: r/o dysphagia, poor po intake  Current Diet: regular diet and thin liquids   Baseline Diet: regular diet and thin liquids      Baseline Assessment   Behavior/Cognition: alert and  confused, [place]  Speech/Language Status: able to participate in basic conversation and able to follow commands inconsistently  Patient Positioning: upright in chair  Pain Status/Interventions/Response to Interventions:   No report of or nonverbal indications of pain  Swallow Mechanism Exam  Facial: symmetrical  Labial: unable to test 2/2 limited command following  Lingual: WFL  Velum: unable to visualize  Mandible: unable to test 2/2 limited command following  Dentition: limited dentition, lower  Vocal quality:clear/adequate   Volitional Cough: weak   Respiratory Status:   On 2 L O2      Consistencies Assessed and Performance   Consistencies Administered: thin liquids, puree and soft solids  Materials administered included  1 piece of toast, purees and thins via straw    Oral Stage: WFL for purees  Mastication was  Not adequate with the  Soft toast   No active chewing, pt kept stating "idon't want it"  Liquids assisted w/ breakdown/transfer Bolus formation and transfer were functional with no significant oral residue noted w/ purees  No overt s/s reduced oral control  Pharyngeal Stage: WFL for liquids    Swallow Mechanics:  Swallowing initiation appeared prompt  Laryngeal rise was palpated and judged to be within functional limits  Minimal cough/ cough like behaviors (vocal grunts/sounds) observed but not felt directly related to swallow  Esophageal Concerns:  none overtly observed w/ limited samples taken today  Strategies and Efficacy: encourage po intake, follow aspiration precautions    Summary and Recommendations (see above)    Results Reviewed with: RN     Pt/Family Education: initiated    Pt and caregivers would benefit from/require continued education  Treatment Recommended: yes     Frequency of treatment:  3-5x/week to complete assessment    Patient Stated Goal:  "Give me some water "    Dysphagia LTG per SLP  -Patient will demonstrate optimum safety and efficacy of oral intake and swallowing function without overt s/sx of penetration or aspiration for the highest appropriate diet level  Short Term Goals:  --Pt will tolerate Dysphagia 3/advanced (dental soft) diet and honey thick nectar thick thin liquid with no significant s/s oral or pharyngeal dysphagia across 1-3 diagnostic session/s  Speech Therapy Prognosis   Prognosis: fair depending on level of cooperation/ medical status    Prognosis Considerations: age and medical status    Jesus Martinez, CCC/SLP  [id number]  [email or web address]  Available via [employer] text

## 2019-10-26 NOTE — PROGRESS NOTES
Progress Note - Justina Montes 1930, 80 y o  female MRN: [id number]    Unit/Bed#:  Encounter: [id number]    Primary Care Provider: Everett Chu DO   Date and time admitted to hospital: 10/22/2019  6:09 PM        * Acute systolic CHF (congestive heart failure) ([address] )  Assessment & Plan  Wt Readings from Last 3 Encounters:   10/26/19 53 9 kg (118 lb 13 3 oz)   19 56 7 kg (125 lb)   19 56 7 kg (125 lb)     · Decrease lasix to 40 mg IV Qdaily  · Increase metoprolol to 50 mg PO every 12 hours  · The patient was seen in consultation by Cardiology  · Daily weights  · Strict intake/output measurements  · Monitor renal function closely while receiving IV Lasix treat    Transfer to med/surg level of care with telemetry monitoring    Echo complete with contrast if indicated   Status: Final result   PACS Images      Show images for Echo complete with contrast if indicated   Study Result     [facility] [address] [facility] [address] [place] [address]  [phone]     Transthoracic Echocardiogram  2D, M-mode, Doppler, and Color Doppler     Study date:  23-Oct-2019     Patient: Eugene Carranza  MR number: [id number]  Account number: [de-identified]  : 1930  Age: 80 years  Gender: Female  Status: Inpatient  Location: Echo lab  Height: 66 in  Weight: 122 lb  BP: 118/ 70 mmHg     Indications: Confusion     Diagnoses: R53 1 - Weakness     Sonographer:  Wilfrido Steward RD, CCT  Primary Physician:  Everett Chu DO  Referring Physician:  Samanta Garcia DO  Group:  [facility]  Interpreting Physician:  Suzanne Miguel MD     SUMMARY     PROCEDURE INFORMATION:  This was a technically difficult study      LEFT VENTRICLE:  Systolic function was moderately to markedly reduced by visual assessment  Ejection fraction was estimated in the range of 55 % to 65 % to be 35 %    There was moderate diffuse hypokinesis most pronounced at the basal to mid segments      RIGHT VENTRICLE:  The size was normal   Systolic function was moderately reduced      LEFT ATRIUM:  The atrium was moderately dilated      MITRAL VALVE:  There was moderate to severe regurgitation      AORTIC VALVE:  There was mild stenosis  There was moderate regurgitation      TRICUSPID VALVE:  There was moderate regurgitation      HISTORY: PRIOR HISTORY: altered mental status     PROCEDURE: The procedure was performed in the echo lab  This was a routine study  The transthoracic approach was used  The study included complete 2D imaging, M-mode, complete spectral Doppler, and color Doppler  The heart rate was 80 bpm,  at the start of the study  Echocardiographic views were limited due to poor patient compliance and combative patient  This was a technically difficult study      LEFT VENTRICLE: Size was normal  Systolic function was moderately to markedly reduced by visual assessment  Ejection fraction was estimated in the range of 55 % to 65 % to be 35 %  There was moderate diffuse hypokinesis most pronounced at  the basal to mid segments  Wall thickness was normal      RIGHT VENTRICLE: The size was normal  Systolic function was moderately reduced  Wall thickness was normal      LEFT ATRIUM: The atrium was moderately dilated      MITRAL VALVE: Valve structure was normal  There was normal leaflet separation  DOPPLER: The transmitral velocity was within the normal range  There was no evidence for stenosis  There was moderate to severe regurgitation      AORTIC VALVE: The valve was trileaflet  Leaflets exhibited mildly increased thickness, mild calcification, and mildly reduced cuspal separation  DOPPLER: Transaortic velocity was within the normal range  There was mild stenosis  There was  moderate regurgitation      TRICUSPID VALVE: The valve structure was normal  There was normal leaflet separation  DOPPLER: The transtricuspid velocity was within the normal range  There was no evidence for stenosis   There was moderate regurgitation  Estimated peak PA  pressure was 40 mmHg   The findings suggest mild pulmonary hypertension      SYSTEM MEASUREMENT TABLES     2D  %FS: 29 1 %  Ao Diam: 3 18 cm  EDV(Teich): 92 03 ml  EF(Teich): 56 01 %  ESV(Teich): 40 48 ml  IVSd: 0 75 cm  LA Diam: 5 02 cm  LVIDd: 4 49 cm  LVIDs: 3 18 cm  LVPWd: 0 87 cm  RWT: 0 39  SV(Teich): 51 55 ml     CW  RAP: 0 mmHg  TR Vmax: 2 99 m/s  TR maxP 7 mmHg     PW  E' Sept: 0 05 m/s  E/E' Sept: 16 42  MV A Fer: 0 39 m/s  MV Dec [place]: 4 95 m/s2  MV DecT: 181 84 ms  MV E Fer: 0 9 m/s  MV E/A Ratio: 2 3  RVSP: 35 7 mmHg     [employer]ters[employer] hospitaletal Commission Accredited Echocardiography Laboratory     Prepared and electronically signed by  Gaines Sacks, MD  Signed 24-Oct-2019 12:35:41            Sepsis [facility])  Assessment & Plan  · Sepsis was present on admission and secondary to a possible pneumonia  · Continue IV levofloxacin  · Discontinue IV vancomycin  · The leukocytosis is improving  · Follow the culture results  · Follow the procalcitonin level    Atrial fibrillation with rapid ventricular response ([facility] )  Assessment & Plan  · The patient was initiated on an IV cardizem drip/infusion on 10/24/2019  · The patient was weaned off the IV cardizem drip/infusion on 10/24/2019  · The patient was seen in consultation by Cardiology  · Increase metoprolol to 50 mg PO every 12 hours  · PRN IV metoprolol  · Continue coumadin for a goal INR 2-3  · The coumadin dose will be held today with an elevated PT/INR    Toxic encephalopathy  Assessment & Plan  · Present on admission  · Secondary to sepsis      Ventricular ectopy  Assessment & Plan  Keep the patient's magnesium at 2 mg/dl and above in the setting of ventricular ectopy  Keep the patient's potassium at 4 mmol/L and above in the setting of ventricular ectopy  Increase metoprolol to 50 mg PO every 12 hours      Hypernatremia  Assessment & Plan  · Likely secondary to IV lasix treatment  · Decrease lasix to 40 mg IV Qdaily    Elevated MCV  Assessment & Plan  · Check a vitamin B12 level and folate level    Pulmonary hypertension (HCC)  Assessment & Plan  · Outpatient Pulmonology evaluation  · Outpatient sleep study    Mitral valve insufficiency  Assessment & Plan  · Outpatient Cardiology evaluation    Skin breakdown  Assessment & Plan  · The patient was evaluated by the wound care nurse with the following impression/recommendations:    Assessment:   Right lower leg abrasion  History of venous ulcerations  Previous patient of the [address] [place] [address] Road has remained ulcer free since discharge from outpatient care on 4/5/2018 per family at beside using Cetaphil and light ACE wraps  Bilateral DP and PT pulses palpable      Plan:   1  Right lower leg:  Dressing change every other day  Cleanse wound with NSS  Apply Nourishing Skin Cream to right lower extremity  Apply Dermagran gauze to open abrasion, cover with gauze  Secure with dinora  Secure with light 4 inch ACE wrap  2   Left lower leg:  Apply Nourishing Skin cream to left lower leg daily  Reapply light 4 inch ACE wrap  3   Elevate heels off bed  4   Follow up with wound care RN 1 week  Elevated troponin level  Assessment & Plan  · Likely non-MI troponin elevation secondary to sepsis and secondary to atrial fibrillation with rapid ventricular response        VTE Pharmacologic Prophylaxis:   Pharmacologic: Warfarin (Coumadin), which will be held today with an elevated PT/INR level  Mechanical VTE Prophylaxis in Place: Yes    Patient Centered Rounds: I have performed bedside rounds with nursing staff today  Time Spent for Care: 20 minutes  More than 50% of total time spent on counseling and coordination of care as described above      Current Length of Stay: 4 day(s)    Current Patient Status: Inpatient   Certification Statement: The patient will continue to require additional inpatient hospital stay due to the need for IV antibiotic treatment, for IV lasix treatment, and for treatment of the rapid atrial fibrillation  Code Status: Level 3 - DNAR and DNI      Subjective: The patient was seen and examined  The patient continues to experience rapid atrial fibrillation  No chest pain  No shortness of breath  Objective:     Vitals:   Temp (24hrs), Av °F (36 7 °C), Min:97 5 °F (36 4 °C), Max:98 9 °F (37 2 °C)    Temp:  [97 5 °F (36 4 °C)-98 9 °F (37 2 °C)] 97 5 °F (36 4 °C)  HR:  [] 128  Resp:  [14-52] 24  BP: ()/() 148/111  SpO2:  [95 %-100 %] 100 %  Body mass index is 19 18 kg/m²  Input and Output Summary (last 24 hours):        Intake/Output Summary (Last 24 hours) at 10/26/2019 09  Last data filed at 10/25/2019 1657  Gross per 24 hour   Intake 300 ml   Output 450 ml   Net -150 ml       Physical Exam:     Physical Exam  General:  NAD, awake, confused, follows commands  HEENT:  NC/AT, mucous membranes moist  Neck:  Supple, No JVP elevation  CV:  + S1, + S2, Tachycardic, Irregularly irregular rhythm  Pulm:  Bibasilar crackles  Abd:  Soft, Non-tender, Non-distended  Ext:  No clubbing/cyanosis/edema  Skin:  No rashes      Additional Data:    Labs:    Results from last 7 days   Lab Units 10/26/19  0625   WBC Thousand/uL 13 24*   HEMOGLOBIN g/dL 12 8   HEMATOCRIT % 42 3   PLATELETS Thousands/uL 187   NEUTROS PCT % 82*   LYMPHS PCT % 6*   MONOS PCT % 10   EOS PCT % 1     Results from last 7 days   Lab Units 10/26/19  0625   SODIUM mmol/L 147*   POTASSIUM mmol/L 3 9   CHLORIDE mmol/L 111*   CO2 mmol/L 26   BUN mg/dL 39*   CREATININE mg/dL 1 27   ANION GAP mmol/L 10   CALCIUM mg/dL 8 5   ALBUMIN g/dL 2 5*   TOTAL BILIRUBIN mg/dL 0 50   ALK PHOS U/L 117*   ALT U/L 61   AST U/L 42   GLUCOSE RANDOM mg/dL 118     Results from last 7 days   Lab Units 10/26/19  0625   INR  3 40*             Results from last 7 days   Lab Units 10/25/19  0612 10/24/19  0639 10/23/19  0559 10/22/19  2345 10/22/19  1933   LACTIC ACID mmol/L 1 4  --   --   -- 2  0   PROCALCITONIN ng/ml 0 40* 0 57* 0 53* 0 41*  --            * I Have Reviewed All Lab Data Listed Above  * Additional Pertinent Lab Tests Reviewed: Leonard Portillo Admission Reviewed      Recent Cultures (last 7 days):     Results from last 7 days   Lab Units 10/23/19  1807 10/23/19  0940 10/22/19  2333 10/22/19  1934   BLOOD CULTURE   --   --   --  No Growth at 72 hrs  No Growth at 72 hrs  URINE CULTURE  No Growth <1000 cfu/mL  --   --   --    INFLUENZA B PCR   --   --  Not Detected  --    RSV PCR   --   --  Not Detected  --    LEGIONELLA URINARY ANTIGEN   --  Negative  --   --        Last 24 Hours Medication List:     Current Facility-Administered Medications:  acetaminophen 650 mg Oral Q6H PRN Derrick Castillo DO    Or        oxyCODONE-acetaminophen 1 tablet Oral Q6H PRN Derrick Castillo, DO    aluminum-magnesium hydroxide-simethicone 30 mL Oral Q6H PRN Derrick Castillo, DO    docusate sodium 100 mg Oral BID PRN Derrick Castillo, DO    furosemide 40 mg Intravenous Daily Derrick Castillo, DO    levalbuterol 0 63 mg Nebulization Q6H PRN Derrick Castillo, DO    levofloxacin 750 mg Intravenous Q48H Derrick Castillo,  Last Rate: Stopped (10/24/19 7339)   magnesium oxide 400 mg Oral Daily Derrick Castillo, DO    metoprolol 5 mg Intravenous Q4H PRN Derrick Castillo, DO    metoprolol tartrate 50 mg Oral Q12H [address] Derrick Castillo,     multivitamin-minerals 1 tablet Oral Daily Derrick Casitllo,     ondansetron 4 mg Intravenous Q6H PRN Derrick Castillo,     potassium chloride 20 mEq Oral Once Derrick Castillo, DO    triamcinolone  Topical BID Derrick Castillo DO         Today, Patient Was Seen By: Derrick Castillo DO    ** Please Note: Dictation voice to text software may have been used in the creation of this document   **

## 2019-10-26 NOTE — ASSESSMENT & PLAN NOTE
· Sepsis was present on admission and secondary to a possible pneumonia  · Continue IV levofloxacin  · Discontinue IV vancomycin  · The leukocytosis is improving  · Follow the culture results  · Follow the procalcitonin level

## 2019-10-26 NOTE — ASSESSMENT & PLAN NOTE
· The patient was evaluated by the wound care nurse with the following impression/recommendations:    Assessment:   Right lower leg abrasion  History of venous ulcerations  Previous patient of the [address] [place] [address] Road has remained ulcer free since discharge from outpatient care on 4/5/2018 per family at beside using Cetaphil and light ACE wraps  Bilateral DP and PT pulses palpable      Plan:   1  Right lower leg:  Dressing change every other day  Cleanse wound with NSS  Apply Nourishing Skin Cream to right lower extremity  Apply Dermagran gauze to open abrasion, cover with gauze  Secure with dinora  Secure with light 4 inch ACE wrap  2   Left lower leg:  Apply Nourishing Skin cream to left lower leg daily  Reapply light 4 inch ACE wrap  3   Elevate heels off bed  4   Follow up with wound care RN 1 week

## 2019-10-26 NOTE — PLAN OF CARE
Problem: PHYSICAL THERAPY ADULT  Goal: Performs mobility at highest level of function for planned discharge setting  See evaluation for individualized goals  Outcome: Progressing  Note:   Prognosis: Guarded  Problem List: Decreased strength, Decreased range of motion, Decreased endurance, Impaired balance, Decreased mobility, Decreased cognition, Impaired judgement, Decreased safety awareness  Assessment: Pt is an 80year old female with complex PMH including dementia and [place] as listed above presenting to [address] [place] [address] with confusion weakness and lethargy  Pt seen for high complexity PT evaluation presenting with decreased ROM strength balance endurance safety awareness mobility transfers and ability to ambulate requiring mod to max(A) x2 for all bed mobility and transfers  Pt still with elevated HR and poor tolerance for activity  Pt OOB in chair after evaluation but refusing to stay in chair at bedside  Pt is in need of continued activity in PT to improve LE strength balance endurance safety mobility transfers and ambulation to maximize current LOF and mobility to ensure safe return home with family  Recommendation: Home PT, Home with family support     PT - OK to Discharge: No    See flowsheet documentation for full assessment

## 2019-10-26 NOTE — PHYSICAL THERAPY NOTE
Physical Therapy Evaluation    Patient Name: Hadley Gonzalez    UOBJV'G Date: 10/26/2019     Problem List  Principal Problem:    Acute systolic CHF (congestive heart failure) ([facility])  Active Problems:    Elevated troponin level    Toxic encephalopathy    Atrial fibrillation with rapid ventricular response ([facility])    Skin breakdown    Mitral valve insufficiency    Pulmonary hypertension ([facility] )    Sepsis ([facility])    Elevated MCV    Hypernatremia    Ventricular ectopy       Past Medical History  Past Medical History:   Diagnosis Date    Acute renal failure superimposed on stage 3 chronic kidney disease ([facility] ) 10/23/2019    Allergic drug reaction     Cellulitis, leg     Chronic atrial fibrillation     Chronic diastolic congestive heart failure ([facility] ) 7/24/2018    Dilated cardiomyopathy ([place] [address] )     Essential (primary) hypertension     Heart failure, unspecified ([place] [address] )     History of echocardiogram 10/28/2015    EF 0 55 (55%), Normal LVSF  Mild to mod MR 2+  Left atrial enlargement   History of echocardiogram 01/03/2018    2-D w/ CFD; EF 0 55 (55%), Normal left vent systolic function  Mod mitral regurg  Left atrial enlargement   History of echocardiogram 06/14/2017    2-D w/CFD    Long term (current) use of anticoagulants     Longstanding persistent atrial fibrillation 7/2/2012    On warfarin    Lymphedema, not elsewhere classified     Mitral regurgitation     Pancreatitis     Rheumatic mitral insufficiency     Rheumatoid arthritis ([facility] )     Stasis dermatitis         Past Surgical History  Past Surgical History:   Procedure Laterality Date    CHOLECYSTECTOMY      followed by procedure to remove gallstones in a pouch outside of gallbladder    TOOTH EXTRACTION             10/26/19 0851   Note Type   Note type Eval/Treat   Pain Assessment   Pain Assessment No/denies pain   Pain Score No Pain   Home Living   Type of [address] Two level; Able to live on main level with bedroom/bathroom  (no ABDIAS, lives with her 2 sons)   [facility]   Additional Comments Pt's daughter sponge bathes her and dresses her  pt ambulates with 2 SPC's with assist  Family performs all IADL's and driving  pt with history of dementia   Prior Function   Level of [place] Needs assistance with ADLs and functional mobility   Lives With Son  (2 son's)   Receives Help From Family   ADL Assistance Needs assistance   IADLs Needs assistance   Comments family drives  Pt with dementia and is hard of hearing and unable to provide home living or prior function   Restrictions/Precautions   Weight Bearing Precautions Per Order No   Other Precautions Aspiration;Multiple lines;Telemetry; Fall Risk; Chair Alarm   General   Family/Caregiver Present No   Cognition   Overall Cognitive Status Impaired   Arousal/Participation Alert   Orientation Level Oriented to person   Following Commands Follows one step commands with increased time or repetition   Comments pt very [place]   RLE Assessment   RLE Assessment X  (ankle df limited, 3+/5 assessed functionally)   LLE Assessment   LLE Assessment X  (ankle df limited, 3+/5 t/o assessed functionally)   Coordination   Sensation [facility]   Light Touch   RLE Light Touch Grossly intact   LLE Light Touch Grossly intact   Bed Mobility   Supine to Sit 2  Maximal assistance   Additional items Assist x 2   Sit to Supine   (OOB in chair with LE's elevated alarm on bell in reach)   Additional Comments HR still elevated in a-fib 114-156, SpO2 100% /86   Transfers   Sit to Stand 3  Moderate assistance   Additional items Assist x 2;Verbal cues; Increased time required   Stand to Sit 3  Moderate assistance   Additional items Assist x 2;Verbal cues   Stand pivot 2  Maximal assistance   Additional items Assist x 2; Increased time required;Verbal cues   Toilet transfer 3  Moderate assistance   Additional items Assist x 2;Commode Additional Comments pt requiring mod(A)x2 to max(A)x2 for sit to stand and stand pivot transfers  Pt performed stand pivot to [facility] then BSC to chair with decreased balance and difficulty advancing LE's for ambulation  Deferred ambulation attempt due to elevated HR   Balance   Static Sitting Good   Dynamic Sitting Fair +   Static Standing Fair -   Dynamic Standing Fair -   Endurance Deficit   Endurance Deficit Yes   Endurance Deficit Description limited activity tolerance, unable to ambulate at this time   Activity Tolerance   Activity Tolerance Patient limited by fatigue;Treatment limited secondary to medical complications (Comment)   Nurse Made Aware nurse in room for evaluation   Assessment   Prognosis Guarded   Problem List Decreased strength;Decreased range of motion;Decreased endurance; Impaired balance;Decreased mobility; Decreased cognition; Impaired judgement;Decreased safety awareness   Assessment Pt is an 80year old female with complex PMH including dementia and [place] as listed above presenting to [facility] with confusion weakness and lethargy  Pt seen for high complexity PT evaluation presenting with decreased ROM strength balance endurance safety awareness mobility transfers and ability to ambulate requiring mod to max(A) x2 for all bed mobility and transfers  Pt still with elevated HR and poor tolerance for activity  Pt OOB in chair after evaluation but refusing to stay in chair at bedside  Pt is in need of continued activity in PT to improve LE strength balance endurance safety mobility transfers and ambulation to maximize current LOF and mobility to ensure safe return home with family     Goals   Patient Goals None stated due to mental status and pt [place]   LTG Expiration Date 11/09/19   Long Term Goal #1 Improve bilateral LE strength by 1/2 muscle grade to improve all bed mobility and transfers with LRAD to min(A)x1   Long Term Goal #2 Improve standing and ambulatory balance to fair with LRAD to improve ambulation to 150ft with LRAD min(A)x1 no LOB   Plan   Treatment/Interventions Functional transfer training;LE strengthening/ROM; Therapeutic exercise; Endurance training;Patient/family training;Bed mobility;Gait training   PT Frequency   (3-5x/wk)   Recommendation   Recommendation Home PT; Home with family support   PT - OK to Discharge No   no SCD's  Pt seated in chair at bedside with alarm on and bell in reach  Nursing staff in room

## 2019-10-26 NOTE — ASSESSMENT & PLAN NOTE
Wt Readings from Last 3 Encounters:   10/26/19 53 9 kg (118 lb 13 3 oz)   19 56 7 kg (125 lb)   19 56 7 kg (125 lb)     · Decrease lasix to 40 mg IV Qdaily  · Increase metoprolol to 50 mg PO every 12 hours  · The patient was seen in consultation by Cardiology  · Daily weights  · Strict intake/output measurements  · Monitor renal function closely while receiving IV Lasix treat    Transfer to [facility]/surg level of care with telemetry monitoring    Echo complete with contrast if indicated   Status: Final result   PACS Images      Show images for Echo complete with contrast if indicated   Study Result     [address]  [address] [place] [address]  [phone]     Transthoracic Echocardiogram  2D, M-mode, Doppler, and Color Doppler     Study date:  23-Oct-2019     Patient: Quincy Chavez  MR number: [id number]  Account number: [de-identified]  : 1930  Age: 80 years  Gender: Female  Status: Inpatient  Location: Echo lab  Height: 66 in  Weight: 122 lb  BP: 118/ 70 mmHg     Indications: Confusion     Diagnoses: R53 1 - Weakness     Sonographer:  Tan Diaz RDCS, CCT  Primary Physician:  Igor Mckeon DO  Referring Physician:  Chris Lizama DO  Group:  [facility]  Interpreting Physician:  Effie Gauthier MD     SUMMARY     PROCEDURE INFORMATION:  This was a technically difficult study      LEFT VENTRICLE:  Systolic function was moderately to markedly reduced by visual assessment  Ejection fraction was estimated in the range of 55 % to 65 % to be 35 %  There was moderate diffuse hypokinesis most pronounced at the basal to mid segments      RIGHT VENTRICLE:  The size was normal   Systolic function was moderately reduced      LEFT ATRIUM:  The atrium was moderately dilated      MITRAL VALVE:  There was moderate to severe regurgitation      AORTIC VALVE:  There was mild stenosis    There was moderate regurgitation      TRICUSPID VALVE:  There was moderate regurgitation      HISTORY: PRIOR HISTORY: altered mental status     PROCEDURE: The procedure was performed in the echo lab  This was a routine study  The transthoracic approach was used  The study included complete 2D imaging, M-mode, complete spectral Doppler, and color Doppler  The heart rate was 80 bpm,  at the start of the study  Echocardiographic views were limited due to poor patient compliance and combative patient  This was a technically difficult study      LEFT VENTRICLE: Size was normal  Systolic function was moderately to markedly reduced by visual assessment  Ejection fraction was estimated in the range of 55 % to 65 % to be 35 %  There was moderate diffuse hypokinesis most pronounced at  the basal to mid segments  Wall thickness was normal      RIGHT VENTRICLE: The size was normal  Systolic function was moderately reduced  Wall thickness was normal      LEFT ATRIUM: The atrium was moderately dilated      MITRAL VALVE: Valve structure was normal  There was normal leaflet separation  DOPPLER: The transmitral velocity was within the normal range  There was no evidence for stenosis  There was moderate to severe regurgitation      AORTIC VALVE: The valve was trileaflet  Leaflets exhibited mildly increased thickness, mild calcification, and mildly reduced cuspal separation  DOPPLER: Transaortic velocity was within the normal range  There was mild stenosis  There was  moderate regurgitation      TRICUSPID VALVE: The valve structure was normal  There was normal leaflet separation  DOPPLER: The transtricuspid velocity was within the normal range  There was no evidence for stenosis  There was moderate regurgitation  Estimated peak PA  pressure was 40 mmHg   The findings suggest mild pulmonary hypertension      SYSTEM MEASUREMENT TABLES     2D  %FS: 29 1 %  Ao Diam: 3 18 cm  EDV(Teich): 92 03 ml  EF(Teich): 56 01 %  ESV(Teich): 40 48 ml  IVSd: 0 75 cm  LA Diam: 5 02 cm  LVIDd: 4 49 cm  LVIDs: 3 18 cm  LVPWd: 0 87 cm  RWT: 0 39  SV(Teich): 51 55 ml     CW  RAP: 0 mmHg  TR Vmax: 2 99 m/s  TR maxP 7 mmHg     PW  E' Sept: 0 05 m/s  E/E' Sept: 16 42  MV A Fer: 0 39 m/s  MV Dec [place]: 4 95 m/s2  MV DecT: 181 84 ms  MV E Fer: 0 9 m/s  MV E/A Ratio: 2 3  RVSP: 35 7 mmHg     [facility] Accredited Echocardiography Laboratory     Prepared and electronically signed by  Gaurav Simon MD  Signed 24-Oct-2019 12:35:41

## 2019-10-26 NOTE — ASSESSMENT & PLAN NOTE
· The patient was initiated on an IV cardizem drip/infusion on 10/24/2019  · The patient was weaned off the IV cardizem drip/infusion on 10/24/2019  · The patient was seen in consultation by Cardiology  · Increase metoprolol to 50 mg PO every 12 hours  · PRN IV metoprolol  · Continue coumadin for a goal INR 2-3  · The coumadin dose will be held today with an elevated PT/INR

## 2019-10-27 NOTE — ASSESSMENT & PLAN NOTE
· Sepsis was present on admission and secondary to a possible pneumonia  · Discontinue IV levofloxacin today given she has completed a 5 day course     · IV vancomycin discontinued on 10/26/19  · The leukocytosis is improving  · Follow the culture results  · procalcitonin level trended down to normal

## 2019-10-27 NOTE — ASSESSMENT & PLAN NOTE
Keep the patient's magnesium at 2 mg/dl and above in the setting of ventricular ectopy  Keep the patient's potassium at 4 mmol/L and above in the setting of ventricular ectopy  Continue increased dose of metoprolol at 50 mg PO every 12 hours

## 2019-10-27 NOTE — ASSESSMENT & PLAN NOTE
· The patient was initiated on an IV cardizem drip/infusion on 10/24/2019  · The patient was weaned off the IV cardizem drip/infusion on 10/24/2019  · The patient was seen in consultation by Cardiology  · Continue increased dose of  metoprolol at 50 mg PO every 12 hours  · PRN IV metoprolol  · Continue coumadin for a goal INR 2-3  · The coumadin dose will be held again today with an elevated PT/INR

## 2019-10-27 NOTE — ASSESSMENT & PLAN NOTE
· The patient was evaluated by the wound care nurse with the following impression/recommendations:    Assessment:   Right lower leg abrasion  History of venous ulcerations  Previous patient of the [address] [place] [address] Road has remained ulcer free since discharge from outpatient care on 4/5/2018 per family at beside using Cetaphil and light ACE wraps  Bilateral DP and PT pulses palpable      Plan:   1  Right lower leg:  Dressing change every other day  Cleanse wound with NSS  Apply Nourishing Skin Cream to right lower extremity  Apply Dermagran gauze to open abrasion, cover with gauze  Secure with dinora  Secure with light 4 inch ACE wrap  2   Left lower leg:  Apply Nourishing Skin cream to left lower leg daily  Reapply light 4 inch ACE wrap  3   Elevate heels off bed  4   Follow up with wound care RN 1 week

## 2019-10-27 NOTE — ASSESSMENT & PLAN NOTE
Wt Readings from Last 3 Encounters:   10/27/19 53 4 kg (117 lb 11 6 oz)   19 56 7 kg (125 lb)   19 56 7 kg (125 lb)     · Continue with decrease lasix at 40 mg IV Qdaily  · Continue with increase metoprolol to 50 mg PO every 12 hours  · The patient was seen in consultation by Cardiology  · Daily weights  · Strict intake/output measurements  · Monitor renal function closely while receiving IV Lasix treat    Transfer to [facility]/surg level of care with telemetry monitoring    Echo complete with contrast if indicated   Status: Final result   PACS Images      Show images for Echo complete with contrast if indicated   Study Result     [address]  [address] [place] [address]  [phone]     Transthoracic Echocardiogram  2D, M-mode, Doppler, and Color Doppler     Study date:  23-Oct-2019     Patient: Dorota Giordano  MR number: [id number]  Account number: [de-identified]  : 1930  Age: 80 years  Gender: Female  Status: Inpatient  Location: Echo lab  Height: 66 in  Weight: 122 lb  BP: 118/ 70 mmHg     Indications: Confusion     Diagnoses: R53 1 - Weakness     Sonographer:  Anette To RDCS, CCT  Primary Physician:  Korey Nunez DO  Referring Physician:  Gucci France DO  Group:  [facility]  Interpreting Physician:  Edmundo Zendejas MD     SUMMARY     PROCEDURE INFORMATION:  This was a technically difficult study      LEFT VENTRICLE:  Systolic function was moderately to markedly reduced by visual assessment  Ejection fraction was estimated in the range of 55 % to 65 % to be 35 %  There was moderate diffuse hypokinesis most pronounced at the basal to mid segments      RIGHT VENTRICLE:  The size was normal   Systolic function was moderately reduced      LEFT ATRIUM:  The atrium was moderately dilated      MITRAL VALVE:  There was moderate to severe regurgitation      AORTIC VALVE:  There was mild stenosis    There was moderate regurgitation      TRICUSPID VALVE:  There was moderate regurgitation      HISTORY: PRIOR HISTORY: altered mental status     PROCEDURE: The procedure was performed in the echo lab  This was a routine study  The transthoracic approach was used  The study included complete 2D imaging, M-mode, complete spectral Doppler, and color Doppler  The heart rate was 80 bpm,  at the start of the study  Echocardiographic views were limited due to poor patient compliance and combative patient  This was a technically difficult study      LEFT VENTRICLE: Size was normal  Systolic function was moderately to markedly reduced by visual assessment  Ejection fraction was estimated in the range of 55 % to 65 % to be 35 %  There was moderate diffuse hypokinesis most pronounced at  the basal to mid segments  Wall thickness was normal      RIGHT VENTRICLE: The size was normal  Systolic function was moderately reduced  Wall thickness was normal      LEFT ATRIUM: The atrium was moderately dilated      MITRAL VALVE: Valve structure was normal  There was normal leaflet separation  DOPPLER: The transmitral velocity was within the normal range  There was no evidence for stenosis  There was moderate to severe regurgitation      AORTIC VALVE: The valve was trileaflet  Leaflets exhibited mildly increased thickness, mild calcification, and mildly reduced cuspal separation  DOPPLER: Transaortic velocity was within the normal range  There was mild stenosis  There was  moderate regurgitation      TRICUSPID VALVE: The valve structure was normal  There was normal leaflet separation  DOPPLER: The transtricuspid velocity was within the normal range  There was no evidence for stenosis  There was moderate regurgitation  Estimated peak PA  pressure was 40 mmHg   The findings suggest mild pulmonary hypertension      SYSTEM MEASUREMENT TABLES     2D  %FS: 29 1 %  Ao Diam: 3 18 cm  EDV(Teich): 92 03 ml  EF(Teich): 56 01 %  ESV(Teich): 40 48 ml  IVSd: 0 75 cm  LA Diam: 5 02 cm  LVIDd: 4 49 cm  LVIDs: 3 18 cm  LVPWd: 0 87 cm  RWT: 0 39  SV(Teich): 51 55 ml     CW  RAP: 0 mmHg  TR Vmax: 2 99 m/s  TR maxP 7 mmHg     PW  E' Sept: 0 05 m/s  E/E' Sept: 16 42  MV A Fer: 0 39 m/s  MV Dec [place]: 4 95 m/s2  MV DecT: 181 84 ms  MV E Fer: 0 9 m/s  MV E/A Ratio: 2 3  RVSP: 35 7 mmHg     [facility] Accredited Echocardiography Laboratory     Prepared and electronically signed by  Montze Barajas MD  Signed 24-Oct-2019 12:35:41

## 2019-10-27 NOTE — PROGRESS NOTES
Progress Note - [facility] 1930, 80 y o  female MRN: [id number]    Unit/Bed#:  Encounter: [id number]    Primary Care Provider: Michelle Romeo DO   Date and time admitted to hospital: 10/22/2019  6:09 PM        * Acute systolic CHF (congestive heart failure) ([address] )  Assessment & Plan  Wt Readings from Last 3 Encounters:   10/27/19 53 4 kg (117 lb 11 6 oz)   19 56 7 kg (125 lb)   19 56 7 kg (125 lb)     · Continue with decrease lasix at 40 mg IV Qdaily  · Continue with increase metoprolol to 50 mg PO every 12 hours  · The patient was seen in consultation by Cardiology  · Daily weights  · Strict intake/output measurements  · Monitor renal function closely while receiving IV Lasix treat    Transfer to med/surg level of care with telemetry monitoring    Echo complete with contrast if indicated   Status: Final result   PACS Images      Show images for Echo complete with contrast if indicated   Study Result     [address] [address] [facility] [address] [place] [address]  [phone]     Transthoracic Echocardiogram  2D, M-mode, Doppler, and Color Doppler     Study date:  23-Oct-2019     Patient: Lucina Marathon  MR number: [id number]  Account number: [de-identified]  : 1930  Age: 80 years  Gender: Female  Status: Inpatient  Location: Echo lab  Height: 66 in  Weight: 122 lb  BP: 118/ 70 mmHg     Indications: Confusion     Diagnoses: R53 1 - Weakness     Sonographer:  Mandy Harrison RDCS, CCT  Primary Physician:  Michelle Romeo DO  Referring Physician:  Kemar Hanna DO  Group:  [facility]  Interpreting Physician:  Quan Rebollar MD     SUMMARY     PROCEDURE INFORMATION:  This was a technically difficult study      LEFT VENTRICLE:  Systolic function was moderately to markedly reduced by visual assessment  Ejection fraction was estimated in the range of 55 % to 65 % to be 35 %    There was moderate diffuse hypokinesis most pronounced at the basal to mid segments      RIGHT VENTRICLE:  The size was normal   Systolic function was moderately reduced      LEFT ATRIUM:  The atrium was moderately dilated      MITRAL VALVE:  There was moderate to severe regurgitation      AORTIC VALVE:  There was mild stenosis  There was moderate regurgitation      TRICUSPID VALVE:  There was moderate regurgitation      HISTORY: PRIOR HISTORY: altered mental status     PROCEDURE: The procedure was performed in the echo lab  This was a routine study  The transthoracic approach was used  The study included complete 2D imaging, M-mode, complete spectral Doppler, and color Doppler  The heart rate was 80 bpm,  at the start of the study  Echocardiographic views were limited due to poor patient compliance and combative patient  This was a technically difficult study      LEFT VENTRICLE: Size was normal  Systolic function was moderately to markedly reduced by visual assessment  Ejection fraction was estimated in the range of 55 % to 65 % to be 35 %  There was moderate diffuse hypokinesis most pronounced at  the basal to mid segments  Wall thickness was normal      RIGHT VENTRICLE: The size was normal  Systolic function was moderately reduced  Wall thickness was normal      LEFT ATRIUM: The atrium was moderately dilated      MITRAL VALVE: Valve structure was normal  There was normal leaflet separation  DOPPLER: The transmitral velocity was within the normal range  There was no evidence for stenosis  There was moderate to severe regurgitation      AORTIC VALVE: The valve was trileaflet  Leaflets exhibited mildly increased thickness, mild calcification, and mildly reduced cuspal separation  DOPPLER: Transaortic velocity was within the normal range  There was mild stenosis  There was  moderate regurgitation      TRICUSPID VALVE: The valve structure was normal  There was normal leaflet separation  DOPPLER: The transtricuspid velocity was within the normal range  There was no evidence for stenosis  There was moderate regurgitation  Estimated peak PA  pressure was 40 mmHg  The findings suggest mild pulmonary hypertension      SYSTEM MEASUREMENT TABLES     2D  %FS: 29 1 %  Ao Diam: 3 18 cm  EDV(Teich): 92 03 ml  EF(Teich): 56 01 %  ESV(Teich): 40 48 ml  IVSd: 0 75 cm  LA Diam: 5 02 cm  LVIDd: 4 49 cm  LVIDs: 3 18 cm  LVPWd: 0 87 cm  RWT: 0 39  SV(Teich): 51 55 ml     CW  RAP: 0 mmHg  TR Vmax: 2 99 m/s  TR maxP 7 mmHg     PW  E' Sept: 0 05 m/s  E/E' Sept: 16 42  MV A Fer: 0 39 m/s  MV Dec Moore: 4 95 m/s2  MV DecT: 181 84 ms  MV E Fer: 0 9 m/s  MV E/A Ratio: 2 3  RVSP: 35 7 mmHg     [employer] Commission Accredited Echocardiography Laboratory     Prepared and electronically signed by  Davion Christie MD  Signed 24-Oct-2019 12:35:41            Sepsis [facility])  Assessment & Plan  · Sepsis was present on admission and secondary to a possible pneumonia  · Discontinue IV levofloxacin today given she has completed a 5 day course     · IV vancomycin discontinued on 10/26/19  · The leukocytosis is improving  · Follow the culture results  · procalcitonin level trended down to normal      Atrial fibrillation with rapid ventricular response ([address] )  Assessment & Plan  · The patient was initiated on an IV cardizem drip/infusion on 10/24/2019  · The patient was weaned off the IV cardizem drip/infusion on 10/24/2019  · The patient was seen in consultation by Cardiology  · Continue increased dose of  metoprolol at 50 mg PO every 12 hours  · PRN IV metoprolol  · Continue coumadin for a goal INR 2-3  · The coumadin dose will be held again today with an elevated PT/INR    Toxic encephalopathy  Assessment & Plan  · Present on admission  · Secondary to sepsis      Ventricular ectopy  Assessment & Plan  Keep the patient's magnesium at 2 mg/dl and above in the setting of ventricular ectopy  Keep the patient's potassium at 4 mmol/L and above in the setting of ventricular ectopy  Continue increased dose of metoprolol at 50 mg PO every 12 hours      Hypernatremia  Assessment & Plan  · Likely secondary to IV lasix treatment  · Contine with decreased dose of lasix at 40 mg IV Qdaily    Elevated MCV  Assessment & Plan  · Vitamin B12 level and folate level- pending    Pulmonary hypertension (HCC)  Assessment & Plan  · Outpatient Pulmonology evaluation  · Outpatient sleep study    Mitral valve insufficiency  Assessment & Plan  · Outpatient Cardiology evaluation    Skin breakdown  Assessment & Plan  · The patient was evaluated by the wound care nurse with the following impression/recommendations:    Assessment:   Right lower leg abrasion  History of venous ulcerations  Previous patient of the [address] [place] [address] has remained ulcer free since discharge from outpatient care on 4/5/2018 per family at beside using Cetaphil and light ACE wraps  Bilateral DP and PT pulses palpable      Plan:   1  Right lower leg:  Dressing change every other day  Cleanse wound with NSS  Apply Nourishing Skin Cream to right lower extremity  Apply Dermagran gauze to open abrasion, cover with gauze  Secure with dinora  Secure with light 4 inch ACE wrap  2   Left lower leg:  Apply Nourishing Skin cream to left lower leg daily  Reapply light 4 inch ACE wrap  3   Elevate heels off bed  4   Follow up with wound care RN 1 week  Elevated troponin level  Assessment & Plan  · Likely non-MI troponin elevation secondary to sepsis and secondary to atrial fibrillation with rapid ventricular response        VTE Pharmacologic Prophylaxis:   Pharmacologic: Warfarin (Coumadin)  Mechanical VTE Prophylaxis in Place: Yes    Patient Centered Rounds: I have performed bedside rounds with nursing staff today  Discussions with Specialists or Other Care Team Provider: nursing and attending    Education and Discussions with Family / Patient: family updated at bedside    Time Spent for Care: 20 minutes    More than 50% of total time spent on counseling and coordination of care as described above  Current Length of Stay: 5 day(s)    Current Patient Status: Inpatient   Certification Statement: The patient will continue to require additional inpatient hospital stay due to continued monitoring off antibiotics and continued need for IV lasix    Discharge Plan: TBD    Code Status: Level 3 - DNAR and DNI      Subjective: The patient was seen and examined  The patient offers no complaints  Objective:     Vitals:   Temp (24hrs), Av 9 °F (36 6 °C), Min:96 9 °F (36 1 °C), Max:98 7 °F (37 1 °C)    Temp:  [96 9 °F (36 1 °C)-98 7 °F (37 1 °C)] 97 2 °F (36 2 °C)  HR:  [105-146] 146  Resp:  [18-52] 50  BP: (115-156)/() 143/96  SpO2:  [97 %-100 %] 98 %  Body mass index is 19 kg/m²  Input and Output Summary (last 24 hours): Intake/Output Summary (Last 24 hours) at 10/27/2019 1414  Last data filed at 10/27/2019 1001  Gross per 24 hour   Intake 1000 ml   Output 600 ml   Net 400 ml       Physical Exam:     Physical Exam   Constitutional: She appears well-developed and well-nourished  She is active and cooperative  Cardiovascular: An irregularly irregular rhythm present  Tachycardia present  Pulmonary/Chest: Effort normal  She has rales in the right lower field and the left lower field  Abdominal: Soft  Normal appearance and bowel sounds are normal  There is no tenderness  Neurological: She is alert  No cranial nerve deficit  The patient has dementia at baseline, per family she is still not quite back to her baseline  Skin: Skin is warm and dry  Nursing note and vitals reviewed      Additional Data:     Labs:    Results from last 7 days   Lab Units 10/27/19  0531   WBC Thousand/uL 11 28*   HEMOGLOBIN g/dL 12 4   HEMATOCRIT % 41 2   PLATELETS Thousands/uL 141*   NEUTROS PCT % 81*   LYMPHS PCT % 6*   MONOS PCT % 11   EOS PCT % 1     Results from last 7 days   Lab Units 10/27/19  0531   SODIUM mmol/L 146*   POTASSIUM mmol/L 3 9   CHLORIDE mmol/L 110*   CO2 mmol/L 26   BUN mg/dL 43*   CREATININE mg/dL 1 16   ANION GAP mmol/L 10   CALCIUM mg/dL 8 3   ALBUMIN g/dL 2 3*   TOTAL BILIRUBIN mg/dL 0 40   ALK PHOS U/L 115   ALT U/L 59   AST U/L 34   GLUCOSE RANDOM mg/dL 116     Results from last 7 days   Lab Units 10/27/19  0531   INR  3 26*             Results from last 7 days   Lab Units 10/26/19  0624 10/25/19  0612 10/24/19  0639 10/23/19  0559 10/22/19  2345 10/22/19  1933   LACTIC ACID mmol/L  --  1 4  --   --   --  2 0   PROCALCITONIN ng/ml 0 24 0 40* 0 57* 0 53* 0 41*  --            * I Have Reviewed All Lab Data Listed Above  * Additional Pertinent Lab Tests Reviewed: All Labs Within Last 24 Hours Reviewed    Imaging:    Imaging Reports Reviewed Today Include: none  Imaging Personally Reviewed by Myself Includes:  none    Recent Cultures (last 7 days):     Results from last 7 days   Lab Units 10/23/19  1807 10/23/19  0940 10/22/19  2333 10/22/19  1934   BLOOD CULTURE   --   --   --  No Growth After 4 Days  No Growth After 4 Days     URINE CULTURE  No Growth <1000 cfu/mL  --   --   --    INFLUENZA B PCR   --   --  Not Detected  --    RSV PCR   --   --  Not Detected  --    LEGIONELLA URINARY ANTIGEN   --  Negative  --   --        Last 24 Hours Medication List:     Current Facility-Administered Medications:  acetaminophen 650 mg Oral Q6H PRN Teena Eddi, DO   Or       oxyCODONE-acetaminophen 1 tablet Oral Q6H PRN Teena Eddi, DO   aluminum-magnesium hydroxide-simethicone 30 mL Oral Q6H PRN Teena Eddi, DO   docusate sodium 100 mg Oral BID PRN Teena Eddi, DO   furosemide 40 mg Intravenous Daily Teena Eddi, DO   levalbuterol 0 63 mg Nebulization Q6H PRN Teena Eddi, DO   magnesium oxide 400 mg Oral Daily Teena Eddi, DO   metoprolol 5 mg Intravenous Q4H PRN Teena Eddi, DO   metoprolol tartrate 50 mg Oral Q12H [address] Teena Eddi, DO   multivitamin-minerals 1 tablet Oral Daily Teena Eddi, DO   ondansetron 4 mg Intravenous Q6H PRN Kristi Little, DO   psyllium 1 packet Oral Daily Kristi Little, DO   triamcinolone  Topical BID Kristi Little, DO        Today, Patient Was Seen By: Bhumika Sol PA-C    ** Please Note: Dictation voice to text software may have been used in the creation of this document   **

## 2019-10-28 PROBLEM — E87.6 HYPOKALEMIA: Status: ACTIVE | Noted: 2019-01-01

## 2019-10-28 PROBLEM — R13.10 DYSPHAGIA: Status: ACTIVE | Noted: 2019-01-01

## 2019-10-28 PROBLEM — J98.11 ATELECTASIS: Status: ACTIVE | Noted: 2019-01-01

## 2019-10-28 PROBLEM — D69.6 THROMBOCYTOPENIA (HCC): Status: ACTIVE | Noted: 2019-01-01

## 2019-10-28 PROBLEM — E88.09 HYPOALBUMINEMIA: Status: ACTIVE | Noted: 2019-01-01

## 2019-10-28 NOTE — RESPIRATORY THERAPY NOTE
Pt not doing Incentive spirometry well for any therapeutic value  Therapist tried flutter valve to improve atelectasis and pt done much better with acapella

## 2019-10-28 NOTE — PLAN OF CARE
Problem: OCCUPATIONAL THERAPY ADULT  Goal: Performs self-care activities at highest level of function for planned discharge setting  See evaluation for individualized goals  Description  Treatment Interventions: ADL retraining, Functional transfer training, UE strengthening/ROM, Endurance training, Patient/family training, Cognitive reorientation, Activityengagement          See flowsheet documentation for full assessment, interventions and recommendations  Note:   Limitation: Decreased ADL status, Decreased UE strength, Decreased UE ROM, Decreased Safe judgement during ADL, Decreased cognition, Decreased endurance, Decreased self-care trans, Decreased high-level ADLs     Assessment: Pt is a 80 y o  female seen for OT evaluation s/p admit to [facility] on 40/08/5900 w/ Acute systolic CHF (congestive heart failure) ([facility] [address] )  Comorbidities affecting pt's functional performance at time of assessment include: HTN, limited hearing, limited cognition, dementia and anemia, CKD, mitral valve regurg  Personal factors affecting pt at time of IE include:difficulty performing ADLS, difficulty performing IADLS , limited insight into deficits, decreased initiation and engagement  and health management   Prior to admission, pt was (A) with ADLs and IADLs with use of SPCs during functional mobility  Upon evaluation: Pt requires mod (A) x2 with use of RW for functional transfers 2* the following deficits impacting occupational performance: weakness, decreased ROM, decreased strength, decreased balance, decreased tolerance, impaired attention, impaired initiation, impaired memory, impaired sequencing, impaired problem solving, decreased safety awareness and impaired interpersonal skills  Pt to benefit from continued skilled OT tx while in the hospital to address deficits as defined above and maximize level of functional independence w ADL's and functional mobility   Occupational Performance areas to address include: grooming, bathing/shower, toilet hygiene, dressing, health maintenance, functional mobility, community mobility and clothing management  From OT standpoint, recommendation at time of d/c would be home with 24 hr (S) and (A) from family       OT Discharge Recommendation: 24 hour supervision/assist

## 2019-10-28 NOTE — OCCUPATIONAL THERAPY NOTE
Occupational Therapy Evaluation     Patient Name: Steven Day  [id number] Date: 10/28/2019  Problem List  Principal Problem:    Acute systolic CHF (congestive heart failure) (HCC)  Active Problems:    Elevated troponin level    Toxic encephalopathy    Atrial fibrillation with rapid ventricular response (HCC)    Skin breakdown    Mitral valve insufficiency    Pulmonary hypertension (HCC)    Sepsis (HCC)    Elevated MCV    Hypernatremia    Ventricular ectopy    Hypoalbuminemia    Dysphagia    Hypokalemia    Thrombocytopenia (HCC)    Atelectasis    Past Medical History  Past Medical History:   Diagnosis Date    Acute renal failure superimposed on stage 3 chronic kidney disease ([facility] [address] ) 10/23/2019    Allergic drug reaction     Cellulitis, leg     Chronic atrial fibrillation     Chronic diastolic congestive heart failure ([facility] ) 7/24/2018    Dilated cardiomyopathy ([facility] )     Essential (primary) hypertension     [facility], [facility] )     History of echocardiogram 10/28/2015    EF 0 55 (55%), Normal LVSF  Mild to mod MR 2+  Left atrial enlargement   History of echocardiogram 01/03/2018    2-D w/ CFD; EF 0 55 (55%), Normal left vent systolic function  Mod mitral regurg  Left atrial enlargement      History of echocardiogram 06/14/2017    2-D w/CFD    Long term (current) use of anticoagulants     Longstanding persistent atrial fibrillation 7/2/2012    On warfarin    Lymphedema, not elsewhere classified     Mitral regurgitation     Pancreatitis     Rheumatic mitral insufficiency     Rheumatoid arthritis ([facility] )     Stasis dermatitis      Past Surgical History  Past Surgical History:   Procedure Laterality Date    CHOLECYSTECTOMY      followed by procedure to remove gallstones in a pouch outside of gallbladder    TOOTH EXTRACTION               10/28/19 0816   Note Type   Note type Eval/Treat   Restrictions/Precautions   Weight Bearing Precautions Per Order No   Other Precautions Aspiration; Chair Alarm;Telemetry;Multiple lines; Fall Risk   Pain Assessment   Pain Assessment No/denies pain   Home Living   Additional Comments see PT evaluation for details; pt resides with family who provide 24 hr support and (S) as pt with significant dementia   Prior Function   Level of [place] Needs assistance with ADLs and functional mobility; Needs assistance with IADLs   Lives With Son   Receives Help From Family   ADL Assistance Needs assistance   IADLs Needs assistance   Falls in the last 6 months 1 to 4   Comments pt's family drives   Psychosocial   Psychosocial (WDL) X   Patient Behaviors/Mood Incongruent   Subjective   Subjective "I don't want to do that"   ADL   Where Assessed Chair   Grooming Assistance 2  Maximal Assistance   Grooming Deficit Brushing hair   LB Dressing Assistance 2  Maximal Assistance   LB Dressing Deficit Don/doff R sock; Don/doff L sock   Bed Mobility   Additional Comments seated in chair at start and end of session; pt on RA during session; SpO2 WFL with no complaints of SOB   Transfers   Sit to Stand 3  Moderate assistance   Additional items Assist x 2; Increased time required;Verbal cues  (RW)   Stand to Sit 3  Moderate assistance   Additional items Assist x 2; Increased time required;Verbal cues  (RW)   Additional Comments requires increased encouragement to participate; increased time required and fatigue and weakness apparent   Functional Mobility   Additional Comments pt did not advance forward this date due to poor command following and attention to task posing significant safety concern this date   Balance   Static Sitting Fair +   Dynamic Sitting Fair +   Static Standing Fair   Dynamic Standing Fair   Activity Tolerance   Activity Tolerance Patient limited by fatigue   RUE Assessment   RUE Assessment X  (3+/5 grossly)   LUE Assessment   LUE Assessment X  (3+/5 grossly)   Hand Function   Gross Motor Coordination Functional   Fine Motor Coordination Functional Sensation   Light Touch No apparent deficits   Sharp/Dull No apparent deficits   Cognition   Overall Cognitive Status Impaired   Arousal/Participation Alert   Attention Difficulty attending to directions   Orientation Level Oriented to person;Disoriented to place; Disoriented to time;Disoriented to situation   Memory Decreased long term memory;Decreased recall of recent events;Decreased short term memory   Following Commands Follows one step commands with increased time or repetition   Comments pt is [facility]   Assessment   Limitation Decreased ADL status; Decreased UE strength;Decreased UE ROM; Decreased Safe judgement during ADL;Decreased cognition;Decreased endurance;Decreased self-care trans;Decreased high-level ADLs   Assessment Pt is a 80 y o  female seen for OT evaluation s/p admit to [facility] on 84/94/0048 w/ Acute systolic CHF (congestive heart failure) ([address] )  Comorbidities affecting pt's functional performance at time of assessment include: HTN, limited hearing, limited cognition, dementia and anemia, CKD, mitral valve regurg  Personal factors affecting pt at time of IE include:difficulty performing ADLS, difficulty performing IADLS , limited insight into deficits, decreased initiation and engagement  and health management   Prior to admission, pt was (A) with ADLs and IADLs with use of SPCs during functional mobility  Upon evaluation: Pt requires mod (A) x2 with use of RW for functional transfers 2* the following deficits impacting occupational performance: weakness, decreased ROM, decreased strength, decreased balance, decreased tolerance, impaired attention, impaired initiation, impaired memory, impaired sequencing, impaired problem solving, decreased safety awareness and impaired interpersonal skills  Pt to benefit from continued skilled OT tx while in the hospital to address deficits as defined above and maximize level of functional independence w ADL's and functional mobility   Occupational Performance areas to address include: grooming, bathing/shower, toilet hygiene, dressing, health maintenance, functional mobility, community mobility and clothing management  From OT standpoint, recommendation at time of d/c would be home with 24 hr (S) and (A) from family  Goals   Patient Goals N/A   Short Term Goal  pt will perform UE strengthening exercises seated in chair   Long Term Goal #1 pt will demonstrate grooming tasks at min (A) level    Long Term Goal #2 pt will increase independence with functional transfers to min (A) with RW    Long Term Goal pt will demonstrate toilet transfers and hygiene at min (A) level    Plan   Treatment Interventions ADL retraining;Functional transfer training;UE strengthening/ROM; Endurance training;Patient/family training;Cognitive reorientation; Activityengagement   Goal Expiration Date 11/11/19   OT Frequency 3-5x/wk   Recommendation   OT Discharge Recommendation 24 hour supervision/assist   Barthel Index   Feeding 5   Bathing 0   Grooming Score 0   Dressing Score 5   Bladder Score 5   Bowels Score 5   Toilet Use Score 5   Transfers (Bed/Chair) Score 5   Mobility (Level Surface) Score 0   Stairs Score 0   Barthel Index Score 30     Pt will benefit from continued OT services in order to maximize (I) c ADL performance, FM c RW, and improve overall endurance/strength required to complete functional tasks in preparation for d/c  Pt left seated in chair at end of session; all needs within reach; all lines intact; scds connected and turned on

## 2019-10-28 NOTE — ASSESSMENT & PLAN NOTE
· Replete with potassium chloride 40 meQ PO BID x 2 doses  · Follow the potassium level and magnesium level

## 2019-10-28 NOTE — ASSESSMENT & PLAN NOTE
Wt Readings from Last 3 Encounters:   10/28/19 54 2 kg (119 lb 7 8 oz)   19 56 7 kg (125 lb)   19 56 7 kg (125 lb)     · Change IV lasix to lasix 40 mg PO Qdaily  · Continue metoprolol 50 mg PO every 12 hours  · The patient was seen in consultation by Cardiology  · Daily weights  · Strict intake/output measurements  · Monitor renal function closely while receiving lasix treatment  · Continue telemetry monitoring  · PT/OT  · The patient requires short-term rehabilitation SNF placement upon discharge  Echo complete with contrast if indicated   Status: Final result   PACS Images      Show images for Echo complete with contrast if indicated   Study Result     [address]  [phone]     Transthoracic Echocardiogram  2D, M-mode, Doppler, and Color Doppler     Study date:  23-Oct-2019     Patient: Pam Quezada  MR number: [id number]  Account number: [de-identified]  : 1930  Age: 80 years  Gender: Female  Status: Inpatient  Location: Echo lab  Height: 66 in  Weight: 122 lb  BP: 118/ 70 mmHg     Indications: Confusion     Diagnoses: R53 1 - Weakness     Sonographer:  Frankie Ng [facility], [facility]  Primary Physician:  Chales Kanner, DO  Referring Physician:  Canelo Field DO  Group:  [facility]  Interpreting Physician:  Mike Duff MD     SUMMARY     PROCEDURE INFORMATION:  This was a technically difficult study      LEFT VENTRICLE:  Systolic function was moderately to markedly reduced by visual assessment  Ejection fraction was estimated in the range of 55 % to 65 % to be 35 %  There was moderate diffuse hypokinesis most pronounced at the basal to mid segments      RIGHT VENTRICLE:  The size was normal   Systolic function was moderately reduced      LEFT ATRIUM:  The atrium was moderately dilated      MITRAL VALVE:  There was moderate to severe regurgitation      AORTIC VALVE:  There was mild stenosis    There was moderate regurgitation      TRICUSPID VALVE:  There was moderate regurgitation      HISTORY: PRIOR HISTORY: altered mental status     PROCEDURE: The procedure was performed in the echo lab  This was a routine study  The transthoracic approach was used  The study included complete 2D imaging, M-mode, complete spectral Doppler, and color Doppler  The heart rate was 80 bpm,  at the start of the study  Echocardiographic views were limited due to poor patient compliance and combative patient  This was a technically difficult study      LEFT VENTRICLE: Size was normal  Systolic function was moderately to markedly reduced by visual assessment  Ejection fraction was estimated in the range of 55 % to 65 % to be 35 %  There was moderate diffuse hypokinesis most pronounced at  the basal to mid segments  Wall thickness was normal      RIGHT VENTRICLE: The size was normal  Systolic function was moderately reduced  Wall thickness was normal      LEFT ATRIUM: The atrium was moderately dilated      MITRAL VALVE: Valve structure was normal  There was normal leaflet separation  DOPPLER: The transmitral velocity was within the normal range  There was no evidence for stenosis  There was moderate to severe regurgitation      AORTIC VALVE: The valve was trileaflet  Leaflets exhibited mildly increased thickness, mild calcification, and mildly reduced cuspal separation  DOPPLER: Transaortic velocity was within the normal range  There was mild stenosis  There was  moderate regurgitation      TRICUSPID VALVE: The valve structure was normal  There was normal leaflet separation  DOPPLER: The transtricuspid velocity was within the normal range  There was no evidence for stenosis  There was moderate regurgitation  Estimated peak PA  pressure was 40 mmHg   The findings suggest mild pulmonary hypertension      SYSTEM MEASUREMENT TABLES     2D  %FS: 29 1 %  Ao Diam: 3 18 cm  EDV(Teich): 92 03 ml  EF(Teich): 56 01 %  ESV(Teich): 40 48 ml  IVSd: 0 75 cm  LA Diam: 5 02 cm  LVIDd: 4 49 cm  LVIDs: 3 18 cm  LVPWd: 0 87 cm  RWT: 0 39  SV(Teich): 51 55 ml     CW  RAP: 0 mmHg  TR Vmax: 2 99 m/s  TR maxP 7 mmHg     PW  E' Sept: 0 05 m/s  E/E' Sept: 16 42  MV A Fer: 0 39 m/s  MV Dec [place]: 4 95 m/s2  MV DecT: 181 84 ms  MV E Fer: 0 9 m/s  MV E/A Ratio: 2 3  RVSP: 35 7 mmHg     [facility] Accredited Echocardiography Laboratory     Prepared and electronically signed by  Nico Franklin MD  Signed 24-Oct-2019 12:35:41

## 2019-10-28 NOTE — ASSESSMENT & PLAN NOTE
· The patient was initiated on an IV cardizem drip/infusion on 10/24/2019  · The patient was weaned off the IV cardizem drip/infusion on 10/24/2019  · The patient was seen in consultation by Cardiology  · Continue metoprolol at 50 mg PO every 12 hours  · Add cardizem 30 mg PO every 6 hours  · PRN IV metoprolol  · Continue coumadin for a goal INR 2-3

## 2019-10-28 NOTE — PROGRESS NOTES
Progress Note - Scout Del Rosario 1930, 80 y o  female MRN: [id number]    Unit/Bed#:  Encounter: [id number]    Primary Care Provider: Myranda Coats DO   Date and time admitted to hospital: 10/22/2019  6:09 PM        * Acute systolic CHF (congestive heart failure) ([address] )  Assessment & Plan  Wt Readings from Last 3 Encounters:   10/28/19 54 2 kg (119 lb 7 8 oz)   19 56 7 kg (125 lb)   19 56 7 kg (125 lb)     · Change IV lasix to lasix 40 mg PO Qdaily  · Continue metoprolol 50 mg PO every 12 hours  · The patient was seen in consultation by Cardiology  · Daily weights  · Strict intake/output measurements  · Monitor renal function closely while receiving lasix treatment  · Continue telemetry monitoring  · PT/OT  · The patient requires short-term rehabilitation SNF placement upon discharge  Echo complete with contrast if indicated   Status: Final result   PACS Images      Show images for Echo complete with contrast if indicated   Study Result     [address] [facility] [address] [facility], [place] [address]  [phone]     Transthoracic Echocardiogram  2D, M-mode, Doppler, and Color Doppler     Study date:  23-Oct-2019     Patient: Jet Olmstead  MR number: [id number]  Account number: [de-identified]  : 1930  Age: 80 years  Gender: Female  Status: Inpatient  Location: Echo lab  Height: 66 in  Weight: 122 lb  BP: 118/ 70 mmHg     Indications: Confusion     Diagnoses: R53 1 - Weakness     Sonographer:  Shobha Dumont RD, CCT  Primary Physician:  Myranda Coats DO  Referring Physician:  Ivy Jacome DO  Group:  [facility]  Interpreting Physician:  Lupe Opitz, MD     SUMMARY     PROCEDURE INFORMATION:  This was a technically difficult study      LEFT VENTRICLE:  Systolic function was moderately to markedly reduced by visual assessment  Ejection fraction was estimated in the range of 55 % to 65 % to be 35 %    There was moderate diffuse hypokinesis most pronounced at the basal to mid segments      RIGHT VENTRICLE:  The size was normal   Systolic function was moderately reduced      LEFT ATRIUM:  The atrium was moderately dilated      MITRAL VALVE:  There was moderate to severe regurgitation      AORTIC VALVE:  There was mild stenosis  There was moderate regurgitation      TRICUSPID VALVE:  There was moderate regurgitation      HISTORY: PRIOR HISTORY: altered mental status     PROCEDURE: The procedure was performed in the echo lab  This was a routine study  The transthoracic approach was used  The study included complete 2D imaging, M-mode, complete spectral Doppler, and color Doppler  The heart rate was 80 bpm,  at the start of the study  Echocardiographic views were limited due to poor patient compliance and combative patient  This was a technically difficult study      LEFT VENTRICLE: Size was normal  Systolic function was moderately to markedly reduced by visual assessment  Ejection fraction was estimated in the range of 55 % to 65 % to be 35 %  There was moderate diffuse hypokinesis most pronounced at  the basal to mid segments  Wall thickness was normal      RIGHT VENTRICLE: The size was normal  Systolic function was moderately reduced  Wall thickness was normal      LEFT ATRIUM: The atrium was moderately dilated      MITRAL VALVE: Valve structure was normal  There was normal leaflet separation  DOPPLER: The transmitral velocity was within the normal range  There was no evidence for stenosis  There was moderate to severe regurgitation      AORTIC VALVE: The valve was trileaflet  Leaflets exhibited mildly increased thickness, mild calcification, and mildly reduced cuspal separation  DOPPLER: Transaortic velocity was within the normal range  There was mild stenosis  There was  moderate regurgitation      TRICUSPID VALVE: The valve structure was normal  There was normal leaflet separation  DOPPLER: The transtricuspid velocity was within the normal range  There was no evidence for stenosis  There was moderate regurgitation  Estimated peak PA  pressure was 40 mmHg   The findings suggest mild pulmonary hypertension      SYSTEM MEASUREMENT TABLES     2D  %FS: 29 1 %  Ao Diam: 3 18 cm  EDV(Teich): 92 03 ml  EF(Teich): 56 01 %  ESV(Teich): 40 48 ml  IVSd: 0 75 cm  LA Diam: 5 02 cm  LVIDd: 4 49 cm  LVIDs: 3 18 cm  LVPWd: 0 87 cm  RWT: 0 39  SV(Teich): 51 55 ml     CW  RAP: 0 mmHg  TR Vmax: 2 99 m/s  TR maxP 7 mmHg     PW  E' Sept: 0 05 m/s  E/E' Sept: 16 42  MV A Fer: 0 39 m/s  MV Dec [place]: 4 95 m/s2  MV DecT: 181 84 ms  MV E Fer: 0 9 m/s  MV E/A Ratio: 2 3  RVSP: 35 7 mmHg     [facility] Accredited Echocardiography Laboratory     Prepared and electronically signed by  Sofiya Torrez MD  Signed 24-Oct-2019 12:35:41            Sepsis [facility])  Assessment & Plan  · Sepsis was present on admission and secondary to a possible pneumonia  · The patient completed a 5-day course of IV levofloxacin during the hospitalization  · The IV vancomycin discontinued on 10/26/19  · The leukocytosis has improved  · Follow the culture results  · The procalcitonin level trended down to normal  · Observe off antibiotics for now    Atrial fibrillation with rapid ventricular response ([address] )  Assessment & Plan  · The patient was initiated on an IV cardizem drip/infusion on 10/24/2019  · The patient was weaned off the IV cardizem drip/infusion on 10/24/2019  · The patient was seen in consultation by Cardiology  · Continue metoprolol at 50 mg PO every 12 hours  · Add cardizem 30 mg PO every 6 hours  · PRN IV metoprolol  · Continue coumadin for a goal INR 2-3    Toxic encephalopathy  Assessment & Plan  · Present on admission  · Secondary to sepsis      Thrombocytopenia (HCC)  Assessment & Plan  · Resolved  · Follow the platelet count    Hypokalemia  Assessment & Plan  · Replete with potassium chloride 40 meQ PO BID x 2 doses  · Follow the potassium level and magnesium level    Dysphagia  Assessment & Plan  · The patient was evaluated by speech therapy  · Change the patient's diet to a dysphagia level 3 (dental soft) consistency with thin liquids  · Aspiration precautions at all times    Hypoalbuminemia  Assessment & Plan  · Monitor the patient's nutritional status closely    Ventricular ectopy  Assessment & Plan  Keep the patient's magnesium at 2 mg/dl and above in the setting of ventricular ectopy  Keep the patient's potassium at 4 mmol/L and above in the setting of ventricular ectopy  Continue metoprolol at 50 mg PO every 12 hours      Hypernatremia  Assessment & Plan  · Likely secondary to IV lasix treatment  · The hypernatremia has resolved    Elevated MCV  Assessment & Plan  Results for Samanta Rivera (MRN [id number]) as of 10/28/2019 10:14   Ref  Range 10/27/2019 05:31   Folate Latest Ref Range: 3 1 - 17 5 ng/mL 17 0   Vitamin B-12 Latest Ref Range: 100 - 900 pg/mL 732       Pulmonary hypertension (HCC)  Assessment & Plan  · Outpatient Pulmonology evaluation  · Outpatient sleep study to evaluate the patient for obstructive sleep apnea in the setting of rapid atrial fibrillation    Mitral valve insufficiency  Assessment & Plan  · Outpatient Cardiology evaluation    Skin breakdown  Assessment & Plan  · The patient was evaluated by the wound care nurse with the following impression/recommendations:    Assessment:   Right lower leg abrasion  History of venous ulcerations  Previous patient of the [address] [place] [address] Road has remained ulcer free since discharge from outpatient care on 4/5/2018 per family at beside using Cetaphil and light ACE wraps  Bilateral DP and PT pulses palpable      Plan:   1  Right lower leg:  Dressing change every other day  Cleanse wound with NSS  Apply Nourishing Skin Cream to right lower extremity  Apply Dermagran gauze to open abrasion, cover with gauze  Secure with dinora  Secure with light 4 inch ACE wrap    2   Left lower leg: Apply Nourishing Skin cream to left lower leg daily  Reapply light 4 inch ACE wrap  3   Elevate heels off bed  4   Follow up with wound care RN 1 week  Elevated troponin level  Assessment & Plan  · Likely non-MI troponin elevation secondary to sepsis and secondary to atrial fibrillation with rapid ventricular response      VTE Pharmacologic Prophylaxis:   Pharmacologic: Warfarin (Coumadin) for a goal INR of 2-3  Mechanical VTE Prophylaxis in Place: Yes    Patient Centered Rounds: I have performed bedside rounds with nursing staff today  Time Spent for Care: 20 minutes  More than 50% of total time spent on counseling and coordination of care as described above  I updated the patient's daughter, Nicolasa Madrid, on the telephone in regards to the patient's current status  Current Length of Stay: 6 day(s)    Current Patient Status: Inpatient   Certification Statement: The patient will continue to require additional inpatient hospital stay due to the need for treatment of the rapid atrial fibrillation, the need to monitor the patient while changing from IV to PO lasix treatment, and for short-term rehabilitation SNF placement upon discharge  Code Status: Level 3 - DNAR and DNI      Subjective: The patient was seen and examined  The patient continues to experience rapid atrial fibrillation  No chest pain  No shortness of breath  No abdominal pain  No nausea or vomiting  Objective:     Vitals:   Temp (24hrs), Av 6 °F (36 4 °C), Min:96 9 °F (36 1 °C), Max:98 5 °F (36 9 °C)    Temp:  [96 9 °F (36 1 °C)-98 5 °F (36 9 °C)] 96 9 °F (36 1 °C)  HR:  [102-132] 130  Resp:  [22-51] 22  BP: (126-161)/() 156/90  SpO2:  [85 %-99 %] 97 %  Body mass index is 19 29 kg/m²  Input and Output Summary (last 24 hours):        Intake/Output Summary (Last 24 hours) at 10/28/2019 1031  Last data filed at 10/28/2019 0932  Gross per 24 hour   Intake 600 ml   Output 750 ml   Net -150 ml       Physical Exam: Physical Exam  General:  NAD, awake, confused at times, follows commands  HEENT:  NC/AT, mucous membranes moist  Neck:  Supple, No JVP elevation  CV:  + S1, + S2, Irregularly irregular rhythm, Tachycardic at times  Pulm:  Lung fields are CTA bilaterally  Abd:  Soft, Non-tender, Non-distended  Ext:  No clubbing/cyanosis/edema  Skin:  No rashes      Additional Data:    Labs:    Results from last 7 days   Lab Units 10/28/19  0545   WBC Thousand/uL 11 64*   HEMOGLOBIN g/dL 13 1   HEMATOCRIT % 41 8   PLATELETS Thousands/uL 234   NEUTROS PCT % 81*   LYMPHS PCT % 6*   MONOS PCT % 10   EOS PCT % 2     Results from last 7 days   Lab Units 10/28/19  0545   SODIUM mmol/L 142   POTASSIUM mmol/L 3 2*   CHLORIDE mmol/L 108   CO2 mmol/L 28   BUN mg/dL 41*   CREATININE mg/dL 1 08   ANION GAP mmol/L 6   CALCIUM mg/dL 8 6   ALBUMIN g/dL 2 3*   TOTAL BILIRUBIN mg/dL 0 50   ALK PHOS U/L 103   ALT U/L 47   AST U/L 24   GLUCOSE RANDOM mg/dL 124     Results from last 7 days   Lab Units 10/28/19  0545   INR  2 77*             Results from last 7 days   Lab Units 10/27/19  0531 10/26/19  0624 10/25/19  0612 10/24/19  0639 10/23/19  0559  10/22/19  1933   LACTIC ACID mmol/L  --   --  1 4  --   --   --  2 0   PROCALCITONIN ng/ml 0 14 0 24 0 40* 0 57* 0 53*   < >  --     < > = values in this interval not displayed  * I Have Reviewed All Lab Data Listed Above  * Additional Pertinent Lab Tests Reviewed: [address] Admission Reviewed      Recent Cultures (last 7 days):     Results from last 7 days   Lab Units 10/23/19  1807 10/23/19  0940 10/22/19  2333 10/22/19  1934   BLOOD CULTURE   --   --   --  No Growth After 5 Days  No Growth After 5 Days     URINE CULTURE  No Growth <1000 cfu/mL  --   --   --    INFLUENZA B PCR   --   --  Not Detected  --    RSV PCR   --   --  Not Detected  --    LEGIONELLA URINARY ANTIGEN   --  Negative  --   --        Last 24 Hours Medication List:     Current Facility-Administered Medications:  acetaminophen 650 mg Oral Q6H PRN [employer], DO   aluminum-magnesium hydroxide-simethicone 30 mL Oral Q6H PRN [employer], DO   diltiazem 30 mg Oral Q6H [address] Walter Bowers, DO   docusate sodium 100 mg Oral BID PRN [employer], DO   [START ON 10/29/2019] furosemide 40 mg Oral Daily [employer], DO   levalbuterol 0 63 mg Nebulization Q6H PRN [employer], DO   metoprolol 5 mg Intravenous Q4H PRN [employer], DO   metoprolol tartrate 50 mg Oral Q12H [address] [employer], DO   multivitamin-minerals 1 tablet Oral Daily [employer], DO   ondansetron 4 mg Intravenous Q6H PRN [employer], DO   potassium chloride 40 mEq Oral BID With Meals [employer], DO   psyllium 1 packet Oral Daily [employer], DO   triamcinolone  Topical BID [employer], DO   warfarin 2 mg Oral Daily (warfarin) [employer]bas [employer], DO        Today, Patient Was Seen By: Sebas Bailey DO    ** Please Note: Dictation voice to text software may have been used in the creation of this document   **

## 2019-10-28 NOTE — ASSESSMENT & PLAN NOTE
Results for Josr Leung (MRN [id number]) as of 10/28/2019 10:14   Ref   Range 10/27/2019 05:31   Folate Latest Ref Range: 3 1 - 17 5 ng/mL 17 0   Vitamin B-12 Latest Ref Range: 100 - 900 pg/mL 732

## 2019-10-28 NOTE — ASSESSMENT & PLAN NOTE
· Outpatient Pulmonology evaluation  · Outpatient sleep study to evaluate the patient for obstructive sleep apnea in the setting of rapid atrial fibrillation

## 2019-10-28 NOTE — ASSESSMENT & PLAN NOTE
Keep the patient's magnesium at 2 mg/dl and above in the setting of ventricular ectopy  Keep the patient's potassium at 4 mmol/L and above in the setting of ventricular ectopy  Continue metoprolol at 50 mg PO every 12 hours

## 2019-10-28 NOTE — ASSESSMENT & PLAN NOTE
· Sepsis was present on admission and secondary to a possible pneumonia  · The patient completed a 5-day course of IV levofloxacin during the hospitalization  · The IV vancomycin discontinued on 10/26/19  · The leukocytosis has improved  · Follow the culture results  · The procalcitonin level trended down to normal  · Observe off antibiotics for now

## 2019-10-28 NOTE — SOCIAL WORK
Spoke with patient and her daughter and two sons  They are all agreeable to patient going to short term rehab  A post acute care recommendation was made by your care team for STR  Discussed Freedom of Choice with patient and POA   List of facilities given to both patient and POA via in person  both patient and POA aware the list is custom filtered for them by preference  and that [facility] post acute providers are designated  They would like me to make a referral to [facility]   Referral made as requested  They are going to talk and come up with a second choice in case [facility] does not have a bed

## 2019-10-28 NOTE — NURSING NOTE
Phone call to respiratory therapist to request flutter valve for patient due to patient having much difficulty with incentive spirometer    RT to come and assess patient

## 2019-10-28 NOTE — PHYSICAL THERAPY NOTE
PT treatment Note      10/28/19 4926   Restrictions/Precautions   Other Precautions   (Aspiration; Chair Alarm;Telemetry;Multiple lines; Fall Risk)   Cognition   Overall Cognitive Status Impaired   Comments [place]   Bed Mobility   Additional Comments OOB in chair at start and end of PT session   Transfers   Sit to Stand 3  Moderate assistance   Additional items Assist x 2; Increased time required;Verbal cues   Stand to Sit 3  Moderate assistance   Additional items Assist x 2;Verbal cues   Additional Comments Unable to complete full stand  Balance   Static Standing Fair -   Endurance Deficit   Endurance Deficit Yes   Activity Tolerance   Activity Tolerance Patient limited by fatigue   Exercises   Hip Flexion 15 reps;AAROM   Hip Abduction 15 reps;AAROM   Hip Adduction 15 reps;AAROM   Knee AROM Long Arc Quad 15 reps;AAROM   Ankle Pumps 15 reps;AAROM   Assessment   Prognosis Fair   Problem List Decreased strength;Decreased endurance; Impaired balance;Decreased mobility; Impaired judgement;Decreased cognition;Decreased safety awareness   Assessment Pt  Currently performing transfers (mod ) x 2 level of function  Utilizing RW with attempt to stand  Pt  With decreased ability to follow commands and attention to task posing a significant safety concern  Transfer training to increase functional task performance and  to promote safe sit/stand and stand/sit mobility  Pt is in need of continued activity in PT to improve strength balance endurance mobility transfers and ambulation with return to maximize LOF  From PT/mobility standpoint, recommendation at time of d/c would be STR  in order to promote return to PLOF and independence  Goals   LTG Expiration Date 11/09/19   Plan   Treatment/Interventions Functional transfer training;LE strengthening/ROM; Therapeutic exercise; Endurance training;Bed mobility;Gait training   Progress Slow progress, cognitive deficits   Pt   OOB in chair   with call bell within reach  and alarm on at end of PT session  Discussed with Theresa Mendez, PT today's treatment and patient's current level of function for care coordination

## 2019-10-28 NOTE — ASSESSMENT & PLAN NOTE
· The patient was evaluated by speech therapy  · Change the patient's diet to a dysphagia level 3 (dental soft) consistency with thin liquids  · Aspiration precautions at all times

## 2019-10-28 NOTE — ASSESSMENT & PLAN NOTE
· The patient was evaluated by the wound care nurse with the following impression/recommendations:    Assessment:   Right lower leg abrasion  History of venous ulcerations  Previous patient of the [address] [place] [address] Road has remained ulcer free since discharge from outpatient care on 4/5/2018 per family at beside using Cetaphil and light ACE wraps  Bilateral DP and PT pulses palpable      Plan:   1  Right lower leg:  Dressing change every other day  Cleanse wound with NSS  Apply Nourishing Skin Cream to right lower extremity  Apply Dermagran gauze to open abrasion, cover with gauze  Secure with dinora  Secure with light 4 inch ACE wrap  2   Left lower leg:  Apply Nourishing Skin cream to left lower leg daily  Reapply light 4 inch ACE wrap  3   Elevate heels off bed  4   Follow up with wound care RN 1 week

## 2019-10-28 NOTE — QUICK NOTE
· The portable chest x-ray resulted with the following impression/results:  XR chest portable   Status: Final result   PACS Images      Show images for XR chest portable   Study Result     CHEST      INDICATION:   shortness of breath      COMPARISON:  Chest radiograph from 10/26/2019 and 10/25/2019  Chest CT from 10/24/2019      EXAM PERFORMED/VIEWS:  XR CHEST PORTABLE        FINDINGS:     Redemonstration of moderate cardiac enlargement      Persistent pulmonary edema with effusions and atelectasis      Osseous structures appear within normal limits for patient age      IMPRESSION:     Persistent pulmonary edema with effusions and atelectasis         · Change lasix back to 40 mg IV Qdaily for now

## 2019-10-29 NOTE — PHYSICAL THERAPY NOTE
PT treatment Note      10/29/19 1103   Cognition   Overall Cognitive Status Impaired   Arousal/Participation Alert   Following Commands Unable to follow one step commands   Endurance Deficit   Endurance Deficit Yes   Activity Tolerance   Activity Tolerance Patient limited by fatigue   Exercises   Heelslides Supine;15 reps;PROM   Hip Flexion Supine;15 reps;PROM   Hip Abduction Supine;15 reps;PROM   Hip Adduction Supine;15 reps;PROM   Knee AROM Short Arc Quad Supine;15 reps;PROM   Ankle Pumps Supine;15 reps;PROM   Assessment   Prognosis Guarded   Problem List Decreased strength;Decreased endurance; Impaired balance;Decreased mobility; Decreased cognition; Impaired judgement;Decreased safety awareness   Assessment Performed PROM as above  Pt  Unable to follow commands to partiicpate with therapeutic exercises  In comparison to previous session, Pt  With decline  in participation  Pt is in need of continued activity in PT to improve strength balance endurance mobility transfers and ambulation with return to maximize LOF  Goals   LTG Expiration Date 11/09/19   Plan   Treatment/Interventions Functional transfer training;LE strengthening/ROM; Therapeutic exercise; Endurance training;Bed mobility;Gait training   Progress Slow progress, cognitive deficits   Recommendation   Recommendation   (continue with POC)   Pt  In bed  with call bell within reach, scd's connected and turned on and alarm on at end of PT session  Discussed with Ricardo Powell, PT today's treatment and patient's current level of function for care coordination

## 2019-10-29 NOTE — PLAN OF CARE
Problem: OCCUPATIONAL THERAPY ADULT  Goal: Performs self-care activities at highest level of function for planned discharge setting  See evaluation for individualized goals  Description  Treatment Interventions: ADL retraining, Functional transfer training, UE strengthening/ROM, Endurance training, Patient/family training, Cognitive reorientation, Activityengagement          See flowsheet documentation for full assessment, interventions and recommendations  Outcome: Progressing  Note:   Limitation: Decreased ADL status, Decreased UE strength, Decreased UE ROM, Decreased Safe judgement during ADL, Decreased cognition, Decreased endurance, Decreased self-care trans, Decreased high-level ADLs     Assessment: Pt with deficits affecing overall functional performance as per initial eval   Appears to be asleep, no verbalizations  Appears to tolerate BUE PROM without apparent signs or symptoms of discomfort    Spoke with OTR who is in agreement to continue active OT services in attempt to facilitate return to highest LOF,     OT Discharge Recommendation: 24 hour supervision/assist

## 2019-10-29 NOTE — SPEECH THERAPY NOTE
Speech Language/Pathology    Speech/Language Pathology Progress Note    Patient Name: Rosie BOSSI Date: 10/29/2019     Problem List  Principal Problem:    Acute systolic CHF (congestive heart failure) ([facility])  Active Problems:    Elevated troponin level    Toxic encephalopathy    Atrial fibrillation with rapid ventricular response (HCC)    Skin breakdown    Mitral valve insufficiency    Pulmonary hypertension (HCC)    Sepsis (HCC)    Elevated MCV    Hypernatremia    Ventricular ectopy    Hypoalbuminemia    Dysphagia    Hypokalemia    Thrombocytopenia (HCC)    Atelectasis       Past Medical History  Past Medical History:   Diagnosis Date    Acute renal failure superimposed on stage 3 chronic kidney disease ([facility] [address] ) 10/23/2019    Allergic drug reaction     Cellulitis, leg     Chronic atrial fibrillation     Chronic diastolic congestive heart failure ([facility] [address] ) 7/24/2018    Dilated cardiomyopathy ([facility] [address] )     Essential (primary) hypertension     Heart failure, unspecified ([facility] )     History of echocardiogram 10/28/2015    EF 0 55 (55%), Normal LVSF  Mild to mod MR 2+  Left atrial enlargement   History of echocardiogram 01/03/2018    2-D w/ CFD; EF 0 55 (55%), Normal left vent systolic function  Mod mitral regurg  Left atrial enlargement   History of echocardiogram 06/14/2017    2-D w/CFD    Long term (current) use of anticoagulants     Longstanding persistent atrial fibrillation 7/2/2012    On warfarin    Lymphedema, not elsewhere classified     Mitral regurgitation     Pancreatitis     Rheumatic mitral insufficiency     Rheumatoid arthritis ([facility])     Stasis dermatitis         Past Surgical History  Past Surgical History:   Procedure Laterality Date    CHOLECYSTECTOMY      followed by procedure to remove gallstones in a pouch outside of gallbladder    TOOTH EXTRACTION           Subjective:  Asleep but arouseable to name  Objective:  Seen for swallow therapy, remains with poor po intake  Requested diet modification to dysphagia 3 w/ thin, remains regular texture  Trialed today w/ gram cracker w/ peanut butter as pt may accept more po if offered as finger food  Pt self fed and stayed on task to complete gram cookie  Breakdown is incomplete, as far as mastication, however pt then tends to use her tongue to further manage bolus  She takes small bites and SLP offered drinks in between  Assessment:  Managed soft-solid snack w/ assist of liquid to fully clear oral cavity    Plan/Recommendations:  Still suggest downgrade to dysphagia level 3 w/ thin liquids  Advised finger food type items (soft-sandwiches, cookies, crackers)  D/w nursing staff  Staff to assist w/ meals/snacks  Consider liquid supplements  Lorri Peabody Wisocky, MA, CCC/SLP  [id number]  [email or web address]  Available via tiger text

## 2019-10-29 NOTE — OCCUPATIONAL THERAPY NOTE
OT Note     10/29/19 1459   Restrictions/Precautions   Other Precautions Fall Risk;Multiple lines;Telemetry   Therapeutic Exercise - ROM   UE-ROM Yes   ROM- Right Upper Extremities   R Shoulder PROM; Flexion; Extension   R Elbow PROM;Elbow flexion;Elbow extension   R Wrist PROM; Wrist flexion;Wrist extension   R Hand PROM  (forearm pro/supination)   R Weight/Reps/Sets 2 sets 10 all motions   ROM - Left Upper Extremities    L Shoulder PROM; Flexion; Extension   L Elbow PROM;Elbow flexion;Elbow extension   L Wrist PROM; Wrist flexion;Wrist extension   L Hand PROM  (Forearm pro/supination)   L Weight/Reps/Sets 2 sets/10   Activity Tolerance   Activity Tolerance   (Appears to tolerate session)   Assessment   Assessment Pt with deficits affecing overall functional performance as per initial eval   Appears to be asleep, no verbalizations  Appears to tolerate BUE PROM without apparent signs or symptoms of discomfort  Spoke with OTR who is in agreement to continue active OT services in attempt to facilitate return to highest LOF,   Plan   Goal Expiration Date 11/11/19   OT Treatment Day 1   Recommendation   OT Discharge Recommendation 24 hour supervision/assist   Remains in supine, all needs in each

## 2019-10-29 NOTE — ASSESSMENT & PLAN NOTE
· Resolved with patient currently mildly bradycardic  · Decrease metoprolol to 25 mg BID, continue PO cardizem for now, if patient remains bradycardic will d/c  · The patient was initiated on an IV cardizem drip/infusion on 10/24/2019  · The patient was weaned off the IV cardizem drip/infusion on 10/24/2019  · The patient was seen in consultation by Cardiology  · PRN IV metoprolol  · Continue coumadin for a goal INR 2-3

## 2019-10-29 NOTE — ASSESSMENT & PLAN NOTE
Keep the patient's magnesium at 2 mg/dl and above in the setting of ventricular ectopy  Keep the patient's potassium at 4 mmol/L and above in the setting of ventricular ectopy  Decrease metoprolol to 25 mg PO every 12 hours due to bradycardia currently

## 2019-10-29 NOTE — ASSESSMENT & PLAN NOTE
Wt Readings from Last 3 Encounters:   10/29/19 53 5 kg (117 lb 15 1 oz)   19 56 7 kg (125 lb)   19 56 7 kg (125 lb)     · Patient's weight has improved, however, she appears dyspneic and CXR reveals persistent pulmonary edema with effusions and atelectasis   · Change Lasix to 40 mg IV BID  · Continue metoprolol   · The patient was seen in consultation by Cardiology  · Daily weights  · Strict intake/output measurements  · Monitor renal function closely while receiving lasix treatment  · Continue telemetry monitoring  · PT/OT  · The patient requires short-term rehabilitation SNF placement upon discharge  Echo complete with contrast if indicated   Status: Final result   PACS Images      Show images for Echo complete with contrast if indicated   Study Result     [address] [facility] [address] [facility] [address] [address]  [phone]     Transthoracic Echocardiogram  2D, M-mode, Doppler, and Color Doppler     Study date:  23-Oct-2019     Patient: Quincy Chavez  MR number: [id number]  Account number: [de-identified]  : 1930  Age: 80 years  Gender: Female  Status: Inpatient  Location: Echo lab  Height: 66 in  Weight: 122 lb  BP: 118/ 70 mmHg     Indications: Confusion     Diagnoses: R53 1 - Weakness     Sonographer:  Tan Diaz RDCS, [facility]  Primary Physician:  Igor Mckeon DO  Referring Physician:  Chris Lizama DO  Group:  [facility]  Interpreting Physician:  Effie Gauthier MD     SUMMARY     PROCEDURE INFORMATION:  This was a technically difficult study      LEFT VENTRICLE:  Systolic function was moderately to markedly reduced by visual assessment  Ejection fraction was estimated in the range of 55 % to 65 % to be 35 %    There was moderate diffuse hypokinesis most pronounced at the basal to mid segments      RIGHT VENTRICLE:  The size was normal   Systolic function was moderately reduced      LEFT ATRIUM:  The atrium was moderately dilated      MITRAL VALVE:  There was moderate to severe regurgitation      AORTIC VALVE:  There was mild stenosis  There was moderate regurgitation      TRICUSPID VALVE:  There was moderate regurgitation      HISTORY: PRIOR HISTORY: altered mental status     PROCEDURE: The procedure was performed in the echo lab  This was a routine study  The transthoracic approach was used  The study included complete 2D imaging, M-mode, complete spectral Doppler, and color Doppler  The heart rate was 80 bpm,  at the start of the study  Echocardiographic views were limited due to poor patient compliance and combative patient  This was a technically difficult study      LEFT VENTRICLE: Size was normal  Systolic function was moderately to markedly reduced by visual assessment  Ejection fraction was estimated in the range of 55 % to 65 % to be 35 %  There was moderate diffuse hypokinesis most pronounced at  the basal to mid segments  Wall thickness was normal      RIGHT VENTRICLE: The size was normal  Systolic function was moderately reduced  Wall thickness was normal      LEFT ATRIUM: The atrium was moderately dilated      MITRAL VALVE: Valve structure was normal  There was normal leaflet separation  DOPPLER: The transmitral velocity was within the normal range  There was no evidence for stenosis  There was moderate to severe regurgitation      AORTIC VALVE: The valve was trileaflet  Leaflets exhibited mildly increased thickness, mild calcification, and mildly reduced cuspal separation  DOPPLER: Transaortic velocity was within the normal range  There was mild stenosis  There was  moderate regurgitation      TRICUSPID VALVE: The valve structure was normal  There was normal leaflet separation  DOPPLER: The transtricuspid velocity was within the normal range  There was no evidence for stenosis  There was moderate regurgitation  Estimated peak PA  pressure was 40 mmHg   The findings suggest mild pulmonary hypertension      SYSTEM MEASUREMENT TABLES     2D  %FS: 29 1 %  Ao Diam: 3 18 cm  EDV(Teich): 92 03 ml  EF(Teich): 56 01 %  ESV(Teich): 40 48 ml  IVSd: 0 75 cm  LA Diam: 5 02 cm  LVIDd: 4 49 cm  LVIDs: 3 18 cm  LVPWd: 0 87 cm  RWT: 0 39  SV(Teich): 51 55 ml     CW  RAP: 0 mmHg  TR Vmax: 2 99 m/s  TR maxP 7 mmHg     PW  E' Sept: 0 05 m/s  E/E' Sept: 16 42  MV A Fer: 0 39 m/s  MV Dec [place]: 4 95 m/s2  MV DecT: 181 84 ms  MV E Fer: 0 9 m/s  MV E/A Ratio: 2 3  RVSP: 35 7 mmHg     [employer] Accredited Echocardiography Laboratory     Prepared and electronically signed by  Kiesha Ramos MD  Signed 24-Oct-2019 12:35:41

## 2019-10-29 NOTE — PROGRESS NOTES
Progress Note - Nadean Dandy 1930, 80 y o  female MRN: [id number]    Unit/Bed#:  Encounter: [id number]    Primary Care Provider: Chales Kanner, DO   Date and time admitted to hospital: 10/22/2019  6:09 PM        * Acute systolic CHF (congestive heart failure) ([address] )  Assessment & Plan  Wt Readings from Last 3 Encounters:   10/29/19 53 5 kg (117 lb 15 1 oz)   19 56 7 kg (125 lb)   19 56 7 kg (125 lb)     · Patient's weight has improved, however, she appears dyspneic and CXR reveals persistent pulmonary edema with effusions and atelectasis   · Change Lasix to 40 mg IV BID  · Continue metoprolol   · The patient was seen in consultation by Cardiology  · Daily weights  · Strict intake/output measurements  · Monitor renal function closely while receiving lasix treatment  · Continue telemetry monitoring  · PT/OT  · The patient requires short-term rehabilitation SNF placement upon discharge  Echo complete with contrast if indicated   Status: Final result   PACS Images      Show images for Echo complete with contrast if indicated   Study Result     [address] [facility] [address] [facility] [address]  [phone]     Transthoracic Echocardiogram  2D, M-mode, Doppler, and Color Doppler     Study date:  23-Oct-2019     Patient: Pam Quezada  MR number: [id number]  Account number: [de-identified]  : 1930  Age: 80 years  Gender: Female  Status: Inpatient  Location: Echo lab  Height: 66 in  Weight: 122 lb  BP: 118/ 70 mmHg     Indications: Confusion     Diagnoses: R53 1 - Weakness     Sonographer:  Frankie Ng RD, [facility]  Primary Physician:  Chales Kanner, DO  Referring Physician:  Canelo Field DO  Group:  [facility]  Interpreting Physician:  Mike Duff MD     SUMMARY     PROCEDURE INFORMATION:  This was a technically difficult study      LEFT VENTRICLE:  Systolic function was moderately to markedly reduced by visual assessment   Ejection fraction was estimated in the range of 55 % to 65 % to be 35 %  There was moderate diffuse hypokinesis most pronounced at the basal to mid segments      RIGHT VENTRICLE:  The size was normal   Systolic function was moderately reduced      LEFT ATRIUM:  The atrium was moderately dilated      MITRAL VALVE:  There was moderate to severe regurgitation      AORTIC VALVE:  There was mild stenosis  There was moderate regurgitation      TRICUSPID VALVE:  There was moderate regurgitation      HISTORY: PRIOR HISTORY: altered mental status     PROCEDURE: The procedure was performed in the echo lab  This was a routine study  The transthoracic approach was used  The study included complete 2D imaging, M-mode, complete spectral Doppler, and color Doppler  The heart rate was 80 bpm,  at the start of the study  Echocardiographic views were limited due to poor patient compliance and combative patient  This was a technically difficult study      LEFT VENTRICLE: Size was normal  Systolic function was moderately to markedly reduced by visual assessment  Ejection fraction was estimated in the range of 55 % to 65 % to be 35 %  There was moderate diffuse hypokinesis most pronounced at  the basal to mid segments  Wall thickness was normal      RIGHT VENTRICLE: The size was normal  Systolic function was moderately reduced  Wall thickness was normal      LEFT ATRIUM: The atrium was moderately dilated      MITRAL VALVE: Valve structure was normal  There was normal leaflet separation  DOPPLER: The transmitral velocity was within the normal range  There was no evidence for stenosis  There was moderate to severe regurgitation      AORTIC VALVE: The valve was trileaflet  Leaflets exhibited mildly increased thickness, mild calcification, and mildly reduced cuspal separation  DOPPLER: Transaortic velocity was within the normal range  There was mild stenosis   There was  moderate regurgitation      TRICUSPID VALVE: The valve structure was normal  There was normal leaflet separation  DOPPLER: The transtricuspid velocity was within the normal range  There was no evidence for stenosis  There was moderate regurgitation  Estimated peak PA  pressure was 40 mmHg   The findings suggest mild pulmonary hypertension      SYSTEM MEASUREMENT TABLES     2D  %FS: 29 1 %  Ao Diam: 3 18 cm  EDV(Teich): 92 03 ml  EF(Teich): 56 01 %  ESV(Teich): 40 48 ml  IVSd: 0 75 cm  LA Diam: 5 02 cm  LVIDd: 4 49 cm  LVIDs: 3 18 cm  LVPWd: 0 87 cm  RWT: 0 39  SV(Teich): 51 55 ml     CW  RAP: 0 mmHg  TR Vmax: 2 99 m/s  TR maxP 7 mmHg     PW  E' Sept: 0 05 m/s  E/E' Sept: 16 42  MV A Fer: 0 39 m/s  MV Dec [place]: 4 95 m/s2  MV DecT: 181 84 ms  MV E Fer: 0 9 m/s  MV E/A Ratio: 2 3  RVSP: 35 7 mmHg     [facility] Accredited Echocardiography Laboratory     Prepared and electronically signed by  Anabell Felix MD  Signed 24-Oct-2019 12:35:41            Atrial fibrillation with rapid ventricular response [facility])  Assessment & Plan  · Resolved with patient currently mildly bradycardic  · Decrease metoprolol to 25 mg BID, continue PO cardizem for now, if patient remains bradycardic will d/c  · The patient was initiated on an IV cardizem drip/infusion on 10/24/2019  · The patient was weaned off the IV cardizem drip/infusion on 10/24/2019  · The patient was seen in consultation by Cardiology  · PRN IV metoprolol  · Continue coumadin for a goal INR 2-3    Atelectasis  Assessment & Plan  · Incentive spirometry 10 times per hour while awake    Thrombocytopenia (HCC)  Assessment & Plan  · Resolved  · Follow the platelet count    Hypokalemia  Assessment & Plan  · Resolved with replacement   · Follow the potassium level and magnesium level    Ventricular ectopy  Assessment & Plan  Keep the patient's magnesium at 2 mg/dl and above in the setting of ventricular ectopy  Keep the patient's potassium at 4 mmol/L and above in the setting of ventricular ectopy  Decrease metoprolol to 25 mg PO every 12 hours due to bradycardia currently      Sepsis [facility])  Assessment & Plan  · Sepsis was present on admission and secondary to a possible pneumonia  · The patient completed a 5-day course of IV levofloxacin during the hospitalization  · The IV vancomycin discontinued on 10/26/19  · The leukocytosis has improved  · Follow the culture results  · The procalcitonin level trended down to normal  · Observe off antibiotics for now    Mitral valve insufficiency  Assessment & Plan  · Appreciate Cardiology evaluation    VTE Pharmacologic Prophylaxis:   Pharmacologic: Warfarin (Coumadin)  Mechanical VTE Prophylaxis in Place: Yes    Patient Centered Rounds: I have performed bedside rounds with nursing staff today  Discussions with Specialists or Other Care Team Provider: multidisciplinary meeting    Education and Discussions with Family / Patient: patient's 2 sons and daughter    Time Spent for Care: 45 minutes  More than 50% of total time spent on counseling and coordination of care as described above  Current Length of Stay: 7 day(s)    Current Patient Status: Inpatient   Certification Statement: The patient will continue to require additional inpatient hospital stay due to need for close monitoring     Discharge Plan: TBD    Code Status: Level 3 - DNAR and DNI      Subjective:   Patient seen and examined  She is sleeping but easily arousable  She is noted for tachypnea  She is able to provide some appropriate answers  No o/n events  Objective:     Vitals:   Temp (24hrs), Av 5 °F (36 4 °C), Min:96 9 °F (36 1 °C), Max:98 °F (36 7 °C)    Temp:  [96 9 °F (36 1 °C)-98 °F (36 7 °C)] 98 °F (36 7 °C)  HR:  [] 118  Resp:  [22-38] 38  BP: (124-141)/() 130/97  SpO2:  [92 %-95 %] 95 %  Body mass index is 19 04 kg/m²  Input and Output Summary (last 24 hours):        Intake/Output Summary (Last 24 hours) at 10/29/2019 1402  Last data filed at 10/28/2019 1746  Gross per 24 hour   Intake    Output 300 ml Net -300 ml       Physical Exam:     Physical Exam   Constitutional: She appears distressed (mild respiratory )  HENT:   Head: Normocephalic and atraumatic  Eyes: Conjunctivae are normal    Neck: No JVD present  Cardiovascular: Bradycardia present  Pulmonary/Chest: Effort normal  No respiratory distress  She has decreased breath sounds  She has no wheezes  She has no rales  Abdominal: Soft  There is no tenderness  Musculoskeletal: She exhibits no edema  Neurological: She is alert  Skin: Skin is warm and dry  Psychiatric: She has a normal mood and affect  Her speech is delayed  She is slowed and withdrawn  Additional Data:     Labs:    Results from last 7 days   Lab Units 10/29/19  0509   WBC Thousand/uL 11 41*   HEMOGLOBIN g/dL 12 9   HEMATOCRIT % 41 8   PLATELETS Thousands/uL 265   NEUTROS PCT % 80*   LYMPHS PCT % 6*   MONOS PCT % 10   EOS PCT % 2     Results from last 7 days   Lab Units 10/29/19  0509 10/28/19  0545   SODIUM mmol/L 143 142   POTASSIUM mmol/L 4 2 3 2*   CHLORIDE mmol/L 108 108   CO2 mmol/L 28 28   BUN mg/dL 35* 41*   CREATININE mg/dL 0 99 1 08   ANION GAP mmol/L 7 6   CALCIUM mg/dL 8 6 8 6   ALBUMIN g/dL  --  2 3*   TOTAL BILIRUBIN mg/dL  --  0 50   ALK PHOS U/L  --  103   ALT U/L  --  47   AST U/L  --  24   GLUCOSE RANDOM mg/dL 117 124     Results from last 7 days   Lab Units 10/29/19  0509   INR  1 97*             Results from last 7 days   Lab Units 10/27/19  0531 10/26/19  0624 10/25/19  0612 10/24/19  0639 10/23/19  0559  10/22/19  1933   LACTIC ACID mmol/L  --   --  1 4  --   --   --  2 0   PROCALCITONIN ng/ml 0 14 0 24 0 40* 0 57* 0 53*   < >  --     < > = values in this interval not displayed  * I Have Reviewed All Lab Data Listed Above  * Additional Pertinent Lab Tests Reviewed:  All [place] Admission Reviewed    Imaging:    Imaging Reports Reviewed Today Include: will review CXR      Recent Cultures (last 7 days):     Results from last 7 days   Lab Units 10/23/19  1807 10/23/19  0940 10/22/19  2333 10/22/19  1934   BLOOD CULTURE   --   --   --  No Growth After 5 Days  No Growth After 5 Days  URINE CULTURE  No Growth <1000 cfu/mL  --   --   --    INFLUENZA B PCR   --   --  Not Detected  --    RSV PCR   --   --  Not Detected  --    LEGIONELLA URINARY ANTIGEN   --  Negative  --   --        Last 24 Hours Medication List:     Current Facility-Administered Medications:  acetaminophen 650 mg Oral Q6H PRN Ritchie Escalante, DO   aluminum-magnesium hydroxide-simethicone 30 mL Oral Q6H PRN Ritchie Escalante, DO   diltiazem 30 mg Oral Q6H [address] Walter Bowers, DO   docusate sodium 100 mg Oral BID PRN Ritchie Escalante, DO   furosemide 40 mg Intravenous BID (diuretic) Manny Mijares MD   levalbuterol 0 63 mg Nebulization Q6H PRN Ritchie Escalante, DO   metoprolol 5 mg Intravenous Q4H PRN Ritchie Escalante, DO   metoprolol tartrate 25 mg Oral Q12H [address] Manny Mijares MD   multivitamin-minerals 1 tablet Oral Daily Ritchie Escalante, DO   ondansetron 4 mg Intravenous Q6H PRN Ritchie Escalante, DO   psyllium 1 packet Oral Daily Ritchie Escalante, DO   triamcinolone  Topical BID Ritchie Escalante, DO   warfarin 2 mg Oral Daily (warfarin) Ritchie Escalante DO        Today, Patient Was Seen By: Bert Sharma MD    ** Please Note: Dictation voice to text software may have been used in the creation of this document   **

## 2019-10-30 PROBLEM — D69.6 THROMBOCYTOPENIA (HCC): Status: RESOLVED | Noted: 2019-01-01 | Resolved: 2019-01-01

## 2019-10-30 NOTE — NURSING NOTE
Nurse report called to Irina [place], patient to go to [facility]th Floor SNF, Room 521-B  Patient's family is aware of the same and are present at bedside  The patient and her belongings are being transported with RN and PCA at bedside

## 2019-10-30 NOTE — ASSESSMENT & PLAN NOTE
· Sepsis was present on admission and secondary to a possible pneumonia  · The patient completed a 5-day course of IV levofloxacin during the hospitalization  · The IV vancomycin discontinued on 10/26/19  · The leukocytosis has improved  · The procalcitonin level trended down to normal  · Stable off antibiotics Implemented All Fall with Harm Risk Interventions:  [place] to call system. Call bell, personal items and telephone within reach. Instruct patient to call for assistance. Room bathroom lighting operational. Non-slip footwear when patient is off stretcher. Physically safe environment: no spills, clutter or unnecessary equipment. Stretcher in lowest position, wheels locked, appropriate side rails in place. Provide visual cue, wrist band, yellow gown, etc. Monitor gait and stability. Monitor for mental status changes and reorient to person, place, and time. Review medications for side effects contributing to fall risk. Reinforce activity limits and safety measures with patient and family. Provide visual clues: red socks.

## 2019-10-30 NOTE — ASSESSMENT & PLAN NOTE
· Resolved with replacement   · Follow the potassium level and magnesium level periodically on outpatient basis

## 2019-10-30 NOTE — OCCUPATIONAL THERAPY NOTE
OT Note     10/30/19 1035   General   Missed Treatment Reason Increased agitation   Assessment   Assessment OT attempted see pt this a m  Presents supine, no verbalizations  Resists attempted ROM  Spoke with ITR who is in agreement to continue active OT services in atttempt to facilitate return to highest LOF  Continue as able

## 2019-10-30 NOTE — ASSESSMENT & PLAN NOTE
Results for Tere Miller (MRN [id number]) as of 10/28/2019 10:14   Ref   Range 10/27/2019 05:31   Folate Latest Ref Range: 3 1 - 17 5 ng/mL 17 0   Vitamin B-12 Latest Ref Range: 100 - 900 pg/mL 732

## 2019-10-30 NOTE — ASSESSMENT & PLAN NOTE
Wt Readings from Last 3 Encounters:   10/30/19 53 1 kg (117 lb 1 oz)   19 56 7 kg (125 lb)   19 56 7 kg (125 lb)     · Patient's weight has improved  · Discharge on Lasix 40 mg p o  B i d    · Continue metoprolol   · The patient was seen in consultation by Cardiology  · Daily weights upon discharge  · Monitor renal function periodically  · The patient requires short-term rehabilitation SNF placement upon discharge  · The patient is at high risk for readmission due to multiple comorbidities, poor compliance due to dementia and refusal to take medications, overall decline  I discussed this with the family and the family is not ready to consider hospice at this time    Echo complete with contrast if indicated   Status: Final result   PACS Images      Show images for Echo complete with contrast if indicated   Study Result     [address] [facility] [address] [facility] [address] [place] [address]  [phone]     Transthoracic Echocardiogram  2D, M-mode, Doppler, and Color Doppler     Study date:  23-Oct-2019     Patient: Sebas Oconnor  MR number: [id number]  Account number: [de-identified]  : 1930  Age: 80 years  Gender: Female  Status: Inpatient  Location: Echo lab  Height: 66 in  Weight: 122 lb  BP: 118/ 70 mmHg     Indications: Confusion     Diagnoses: R53 1 - Weakness     Sonographer:  Dao Ramon [facility], CCT  Primary Physician:  Siddharth Sal DO  Referring Physician:  Kati Wilkinson DO  Group:  [facility]  Interpreting Physician:  Sherryle Faster, MD     SUMMARY     PROCEDURE INFORMATION:  This was a technically difficult study      LEFT VENTRICLE:  Systolic function was moderately to markedly reduced by visual assessment  Ejection fraction was estimated in the range of 55 % to 65 % to be 35 %    There was moderate diffuse hypokinesis most pronounced at the basal to mid segments      RIGHT VENTRICLE:  The size was normal   Systolic function was moderately reduced      LEFT ATRIUM:  The atrium was moderately dilated      MITRAL VALVE:  There was moderate to severe regurgitation      AORTIC VALVE:  There was mild stenosis  There was moderate regurgitation      TRICUSPID VALVE:  There was moderate regurgitation      HISTORY: PRIOR HISTORY: altered mental status     PROCEDURE: The procedure was performed in the echo lab  This was a routine study  The transthoracic approach was used  The study included complete 2D imaging, M-mode, complete spectral Doppler, and color Doppler  The heart rate was 80 bpm,  at the start of the study  Echocardiographic views were limited due to poor patient compliance and combative patient  This was a technically difficult study      LEFT VENTRICLE: Size was normal  Systolic function was moderately to markedly reduced by visual assessment  Ejection fraction was estimated in the range of 55 % to 65 % to be 35 %  There was moderate diffuse hypokinesis most pronounced at  the basal to mid segments  Wall thickness was normal      RIGHT VENTRICLE: The size was normal  Systolic function was moderately reduced  Wall thickness was normal      LEFT ATRIUM: The atrium was moderately dilated      MITRAL VALVE: Valve structure was normal  There was normal leaflet separation  DOPPLER: The transmitral velocity was within the normal range  There was no evidence for stenosis  There was moderate to severe regurgitation      AORTIC VALVE: The valve was trileaflet  Leaflets exhibited mildly increased thickness, mild calcification, and mildly reduced cuspal separation  DOPPLER: Transaortic velocity was within the normal range  There was mild stenosis  There was  moderate regurgitation      TRICUSPID VALVE: The valve structure was normal  There was normal leaflet separation  DOPPLER: The transtricuspid velocity was within the normal range  There was no evidence for stenosis  There was moderate regurgitation  Estimated peak PA  pressure was 40 mmHg   The findings suggest mild pulmonary hypertension      SYSTEM MEASUREMENT TABLES     2D  %FS: 29 1 %  Ao Diam: 3 18 cm  EDV(Teich): 92 03 ml  EF(Teich): 56 01 %  ESV(Teich): 40 48 ml  IVSd: 0 75 cm  LA Diam: 5 02 cm  LVIDd: 4 49 cm  LVIDs: 3 18 cm  LVPWd: 0 87 cm  RWT: 0 39  SV(Teich): 51 55 ml     CW  RAP: 0 mmHg  TR Vmax: 2 99 m/s  TR maxP 7 mmHg     PW  E' Sept: 0 05 m/s  E/E' Sept: 16 42  MV A Fer: 0 39 m/s  MV Dec [place]: 4 95 m/s2  MV DecT: 181 84 ms  MV E Fer: 0 9 m/s  MV E/A Ratio: 2 3  RVSP: 35 7 mmHg     [facility] Accredited Echocardiography Laboratory     Prepared and electronically signed by  Jacky Pinzon MD  Signed 24-Oct-2019 12:35:41

## 2019-10-30 NOTE — ASSESSMENT & PLAN NOTE
· Discharge on metoprolol 50 mg b i d , continue Cardizem 120 mg extended release  · The patient did require IV Cardizem infusion on admission on 10/24/2019  · The patient was seen in consultation by Cardiology, input appreciated  · Continue coumadin for a goal INR 2-3, monitor INR upon discharge  · It is noted that the patient intermittently refuses oral medications, staff should continue to encourage compliance

## 2019-10-30 NOTE — SOCIAL WORK
Pt is being discharged today  Pt is going to be discharged to [facility] [address] [facility]  Pt will be transferred with all belongings  Pt son and daughter present and aware of transfer  Case Management reviewed discharge planning process including the following: identifying help that is needed at home, pt's preference for discharge needs and Meds at [facility]  Reviewed with Pt that any member of the healthcare team can answer questions regarding : medications, jmportance of recognizing  Signs and symptoms of any  medical problems  Case Management also encouraged pt to follow up with all recommended appointments after discharge

## 2019-10-30 NOTE — DISCHARGE SUMMARY
Discharge- Alesia Ivan 1930, 80 y o  female MRN: [id number]    Unit/Bed#:  Encounter: [id number]    Primary Care Provider: Lisa Whitaker DO   Date and time admitted to hospital: 10/22/2019  6:09 PM        * Acute systolic CHF (congestive heart failure) ([facility] [address] )  Assessment & Plan  Wt Readings from Last 3 Encounters:   10/30/19 53 1 kg (117 lb 1 oz)   19 56 7 kg (125 lb)   19 56 7 kg (125 lb)     · Patient's weight has improved  · Discharge on Lasix 40 mg p o  B i d    · Continue metoprolol   · The patient was seen in consultation by Cardiology  · Daily weights upon discharge  · Monitor renal function periodically  · The patient requires short-term rehabilitation SNF placement upon discharge  · The patient is at high risk for readmission due to multiple comorbidities, poor compliance due to dementia and refusal to take medications, overall decline    I discussed this with the family and the family is not ready to consider hospice at this time    Echo complete with contrast if indicated   Status: Final result   PACS Images      Show images for Echo complete with contrast if indicated   Study Result     [address]  [address] [place] [address]  [phone]     Transthoracic Echocardiogram  2D, M-mode, Doppler, and Color Doppler     Study date:  23-Oct-2019     Patient: Marcia Klein  MR number: [id number]  Account number: [de-identified]  : 1930  Age: 80 years  Gender: Female  Status: Inpatient  Location: Echo lab  Height: 66 in  Weight: 122 lb  BP: 118/ 70 mmHg     Indications: Confusion     Diagnoses: R53 1 - Weakness     Sonographer:  Yoly Webber [facility], CCT  Primary Physician:  Lisa Whitaker DO  Referring Physician:  Summer Henson DO  Group:  [facility]  Interpreting Physician:  Do sOorio MD     SUMMARY     PROCEDURE INFORMATION:  This was a technically difficult study      LEFT VENTRICLE:  Systolic function was moderately to markedly reduced by visual assessment  Ejection fraction was estimated in the range of 55 % to 65 % to be 35 %  There was moderate diffuse hypokinesis most pronounced at the basal to mid segments      RIGHT VENTRICLE:  The size was normal   Systolic function was moderately reduced      LEFT ATRIUM:  The atrium was moderately dilated      MITRAL VALVE:  There was moderate to severe regurgitation      AORTIC VALVE:  There was mild stenosis  There was moderate regurgitation      TRICUSPID VALVE:  There was moderate regurgitation      HISTORY: PRIOR HISTORY: altered mental status     PROCEDURE: The procedure was performed in the echo lab  This was a routine study  The transthoracic approach was used  The study included complete 2D imaging, M-mode, complete spectral Doppler, and color Doppler  The heart rate was 80 bpm,  at the start of the study  Echocardiographic views were limited due to poor patient compliance and combative patient  This was a technically difficult study      LEFT VENTRICLE: Size was normal  Systolic function was moderately to markedly reduced by visual assessment  Ejection fraction was estimated in the range of 55 % to 65 % to be 35 %  There was moderate diffuse hypokinesis most pronounced at  the basal to mid segments  Wall thickness was normal      RIGHT VENTRICLE: The size was normal  Systolic function was moderately reduced  Wall thickness was normal      LEFT ATRIUM: The atrium was moderately dilated      MITRAL VALVE: Valve structure was normal  There was normal leaflet separation  DOPPLER: The transmitral velocity was within the normal range  There was no evidence for stenosis  There was moderate to severe regurgitation      AORTIC VALVE: The valve was trileaflet  Leaflets exhibited mildly increased thickness, mild calcification, and mildly reduced cuspal separation  DOPPLER: Transaortic velocity was within the normal range  There was mild stenosis   There was  moderate regurgitation      TRICUSPID VALVE: The valve structure was normal  There was normal leaflet separation  DOPPLER: The transtricuspid velocity was within the normal range  There was no evidence for stenosis  There was moderate regurgitation  Estimated peak PA  pressure was 40 mmHg   The findings suggest mild pulmonary hypertension      SYSTEM MEASUREMENT TABLES     2D  %FS: 29 1 %  Ao Diam: 3 18 cm  EDV(Teich): 92 03 ml  EF(Teich): 56 01 %  ESV(Teich): 40 48 ml  IVSd: 0 75 cm  LA Diam: 5 02 cm  LVIDd: 4 49 cm  LVIDs: 3 18 cm  LVPWd: 0 87 cm  RWT: 0 39  SV(Teich): 51 55 ml     CW  RAP: 0 mmHg  TR Vmax: 2 99 m/s  TR maxP 7 mmHg     PW  E' Sept: 0 05 m/s  E/E' Sept: 16 42  MV A Fer: 0 39 m/s  MV Dec [place]: 4 95 m/s2  MV DecT: 181 84 ms  MV E Fer: 0 9 m/s  MV E/A Ratio: 2 3  RVSP: 35 7 mmHg     [facility] Accredited Echocardiography Laboratory     Prepared and electronically signed by  July Bangura MD  Signed 24-Oct-2019 12:35:41            Atrial fibrillation with rapid ventricular response ([address] )  Assessment & Plan  · Discharge on metoprolol 50 mg b i d , continue Cardizem 120 mg extended release  · The patient did require IV Cardizem infusion on admission on 10/24/2019  · The patient was seen in consultation by Cardiology, input appreciated  · Continue coumadin for a goal INR 2-3, monitor INR upon discharge  · It is noted that the patient intermittently refuses oral medications, staff should continue to encourage compliance    Hypokalemia  Assessment & Plan  · Resolved with replacement   · Follow the potassium level and magnesium level periodically on outpatient basis    Dysphagia  Assessment & Plan  · The patient was evaluated by speech therapy  · Change the patient's diet was changed to a dysphagia level 3 (dental soft) consistency with thin liquids  · Aspiration precautions at all times    Elevated MCV  Assessment & Plan  Results for Javi Mcmillan (MRN [id number]) as of 10/28/2019 10:14 Ref  Range 10/27/2019 05:31   Folate Latest Ref Range: 3 1 - 17 5 ng/mL 17 0   Vitamin B-12 Latest Ref Range: 100 - 900 pg/mL 732       Sepsis ([facility] [address] )  Assessment & Plan  · Sepsis was present on admission and secondary to a possible pneumonia  · The patient completed a 5-day course of IV levofloxacin during the hospitalization  · The IV vancomycin discontinued on 10/26/19  · The leukocytosis has improved  · The procalcitonin level trended down to normal  · Stable off antibiotics     Toxic encephalopathy  Assessment & Plan  · Present on admission secondary to sepsis and improved, patient appears at mental baseline        Elevated troponin level  Assessment & Plan  · Likely non-MI troponin elevation secondary to sepsis and secondary to atrial fibrillation with rapid ventricular response      Thrombocytopenia (HCC)resolved as of 10/30/2019  Assessment & Plan  · Resolved  · Follow the platelet count periodically on outpatient basis      Discharging Physician / Practitioner: Toby Inman MD  PCP: Mann Luna DO  Admission Date:   Admission Orders (From admission, onward)     Ordered        10/22/19 2158  Inpatient Admission (expected length of stay for this patient Order details is greater than two midnights)  Once                   Discharge Date: 10/30/19    Resolved Problems  Date Reviewed: 10/30/2019          Resolved    Thrombocytopenia ([facility] [address] ) 10/30/2019     Resolved by  Toby Inman MD          Consultations During Hospital Stay:  · Cardiology    Procedures Performed:   XR chest portable   Final Result by Kaycee Pruett MD (10/29 1520)      Persistent edema with effusions with slight improvement in bibasilar atelectasis  Workstation performed: [id number]         XR chest portable   Final Result by Kaycee Pruett MD (10/28 1107)      Persistent pulmonary edema with effusions and atelectasis              Workstation performed: [id number]         XR chest portable   Final Result by Amrit Jordan MD (10/26 8964)      Developing process of the right middle or lower lobe  Recommend short-term follow-up  Workstation performed: [id number]         XR chest portable   Final Result by Neto Almanza MD (10/25 1309)      Stable chest with persistent left lower lobe pleural parenchymal density  Workstation performed: [id number]         CT chest wo contrast   Final Result by Renald Barthel, MD (10/24 1503)      Severe global cardiomegaly  Central pulmonary vascular congestion and small bilateral dependent pleural effusion  Dependent opacities favoring atelectasis and probable mild component of interstitial edema  Specificity segmentally limited due to free breathing respiratory motion  Workstation performed: [id number]         XR chest portable   Final Result by Clarice Lennox, DO (10/24 6668)   Improving pulmonary edema  Improving right lower lung infiltrate/atelectasis  Workstation performed: [id number]         VAS lower limb venous duplex study, unilateral/limited   Final Result by Db Becker MD (10/24 0033)      XR chest pa & lateral   Final Result by Barbara Garcia MD (10/23 1624)      There is no evidence of focal airspace consolidation  Findings do suggest pulmonary vascular congestion with right lower lung field subsegmental atelectasis            Workstation performed: [id number]         XR chest portable   Final Result by Pamela Trevizo DO (10/23 1110)      VERY LIMITED STUDY  Left cardiac opacity may be artifactual, cannot exclude infiltrate  Recommend repeat frontal and lateral views with better positioning  The study was marked in [facility] for immediate notification  Workstation performed: [id number]         CT head without contrast   Final Result by Biju Case MD (10/22 1922)      No acute intracranial abnormality                    Workstation performed: [id number]         X-ray chest 2 views   Final Result by Filemon Osborn Fabiana Mckoy MD (10/23 0739)      Scarring/atelectasis at the right lower lung field otherwise no acute cardiopulmonary disease  Workstation performed: [id number]             Significant Findings / Test Results:   Results from last 7 days   Lab Units 10/30/19  0612   WBC Thousand/uL 12 45*   HEMOGLOBIN g/dL 14 4   HEMATOCRIT % 47 3*   PLATELETS Thousands/uL 280     Results from last 7 days   Lab Units 10/30/19  0612   SODIUM mmol/L 144   POTASSIUM mmol/L 3 6   CHLORIDE mmol/L 106   CO2 mmol/L 33*   BUN mg/dL 29*   CREATININE mg/dL 1 05   CALCIUM mg/dL 8 5         Incidental Findings:   · None     Test Results Pending at Discharge (will require follow up): · None     Outpatient Tests Requested:  · Monitor INR    Complications:  None    Reason for Admission: confusion, lethargy    Hospital Course:     Hadley Gonzalez is a 80 y o  female patient with history of dementia, CHF, atrial fibrillation who originally presented to the hospital on 10/22/2019 due to worsening confusion, lethargy and lower extremity edema  The patient was found to be in rapid atrial fibrillation associated with sepsis presumed to be secondary to pneumonia  The patient was treated with IV fluids and antibiotics with resolution of her sepsis  During hospitalization the patient was also noted for acute on chronic diastolic CHF  She was treated with IV diuretics with improvement in her respiratory status, decreased weight and improvement in her heart rate  The patient continued to have episodic breakthrough rapid atrial fibrillation due to occasional refusal to take her oral medications, she did respond well to her oral medications with her heart rate normalizing upon her agreement to take her rate-controlling treatment  Overall, the patient was noted for continual decline with worsening dementia associated with other comorbidities    Discussion regarding the patient's overall poor prognosis was discussed with the family and the family at this point declined evaluation by hospice  The patient was discharged to 5th floor [address]  home and rehab for close support and therapies  Please see above list of diagnoses and related plan for additional information  Condition at Discharge: fair     Discharge Day Visit / Exam:     Subjective:  Patient seen and examined  She does not appear in distress  She intermittently refuses her medications and intervention by staff  She did complete a large meal at lunchtime  He does not provide meaningful history and does not comment on specific symptoms  There were no overnight events  Vitals: Blood Pressure: 125/84 (10/30/19 1245)  Pulse:  96 (10/30/19 0710)  Temperature: (!) 97 °F (36 1 °C) (10/30/19 0710)  Temp Source: Temporal (10/30/19 0710)  Respirations: (!) 24 (10/30/19 1000)  Height: 5' 6" (167 6 cm) (10/23/19 1608)  Weight - Scale: 53 1 kg (117 lb 1 oz) (10/30/19 0600)  SpO2: 93 % (10/30/19 0710)    Exam:     Physical Exam   Constitutional: No distress  HENT:   Head: Normocephalic and atraumatic  Eyes: Conjunctivae are normal    Neck: No JVD present  Cardiovascular: Normal rate  An irregularly irregular rhythm present  No murmur heard  Pulmonary/Chest: Effort normal  No respiratory distress  She has no wheezes  She has no rales  Abdominal: Soft  She exhibits no distension  There is no tenderness  There is no guarding  Neurological: She is alert  Skin: Skin is warm and dry  Psychiatric: Cognition and memory are impaired  Discussion with Family: sons and daughter at bedside    Discharge instructions/Information to patient and family:   See after visit summary for information provided to patient and family  Provisions for Follow-Up Care:  See after visit summary for information related to follow-up care and any pertinent home health orders        Disposition:     [facility] [address] [facility] to [facility] SNF:   · Not Applicable to this Patient - Not Applicable to this Patient    Planned Readmission: High risk for readmission     Discharge Statement:  I spent 35 minutes discharging the patient  This time was spent on the day of discharge  I had direct contact with the patient on the day of discharge  Greater than 50% of the total time was spent examining patient, answering all patient questions, arranging and discussing plan of care with patient as well as directly providing post-discharge instructions  Additional time then spent on discharge activities  Discharge Medications:  See after visit summary for reconciled discharge medications provided to patient and family        ** Please Note: This note has been constructed using a voice recognition system **

## 2019-10-30 NOTE — ASSESSMENT & PLAN NOTE
· The patient was evaluated by speech therapy  · Change the patient's diet was changed to a dysphagia level 3 (dental soft) consistency with thin liquids  · Aspiration precautions at all times

## 2019-11-01 NOTE — CONSULTS
Continue Right lower leg dressing change every other day  Cleanse wound with NSS  Apply Nourishing Skin Cream to right lower extremity  Apply Dermagran gauze to open abrasion, cover with gauze  Secure with dinora or Tegaderm  Secure with light 4 inch ACE wrap  Continue to apply Nourishing Skin Cream to left lower leg daily  Light 4 inch ACE wrap  Will follow up with patient on 11/6/2019 for wound care rounds  Plan of care reviewed with assigned nurse  Yonatan Ratliff RN   CWOCN  11/1/2019 13:25

## 2019-11-06 NOTE — WOUND OSTOMY CARE
Progress Note - Wound   Thena Deeds 80 y o  female MRN: [id number]  Unit/Bed#: -08 Encounter: [id number]      Assessment:   Right anterior lower leg abrasion  Decreased size  New open area on right lateral posterior lower leg  Circular shape, draining serosang and purulent drainage  No periwound erythema  No edema  Will cover with silver alginate and reassess 1 week  Light ACE wrap reapplied  Son at bedside  Plan:   1  Discontinue:  1  Right lower leg:  Dressing change every other day  Cleanse wound with NSS  Apply Nourishing Skin Cream to right lower extremity  Apply Dermagran gauze to open abrasion, cover with gauze  Secure with dinora  Secure with light 4 inch ACE wrap  2   New order: Right lower leg:  Dressing change every other day  Cleanse wound with NSS  Apply Nourishing Skin Cream to right lower extremity  Apply Maxorb Ag to open abrasion, cover with gauze  Secure with dinora  Secure with light 4 inch ACE wrap  3   New order:  Right lateral posterior lower leg: Dressing change every other day  Cleanse wound with NSS  Apply Nourishing Skin Cream to right lower extremity  Maxorb Ag to open abrasion, cover with gauze  Secure with dinora  Secure with light 4 inch ACE wrap  4   Follow up with wound care RN 1 week      Wound 10/25/19 Traumatic Abrasion(s) Leg Anterior;Right (Active)   Wound Description Granulation tissue 11/6/2019 12:24 PM   Cristiana-wound Assessment Maceration;Yellow-brown 11/6/2019 12:24 PM   Wound Length (cm) 0 2 cm 11/6/2019 12:24 PM   Wound Width (cm) 0 5 cm 11/6/2019 12:24 PM   Wound Depth (cm) 0 1 11/6/2019 12:24 PM   Calculated Wound Area (cm^2) 0 1 cm^2 11/6/2019 12:24 PM   Calculated Wound Volume (cm^3) 0 01 cm^3 11/6/2019 12:24 PM   Change in Wound Size % 50 11/6/2019 12:24 PM   Drainage Amount Small 11/6/2019 12:24 PM   Drainage Description Serosanguineous 11/6/2019 12:24 PM   Non-staged Wound Description Full thickness 11/6/2019 12:24 PM   Treatments Cleansed 11/6/2019 12:24 PM   Dressing Calcium Alginate with Silver 11/6/2019 12:24 PM   Wound packed? No 11/6/2019 12:24 PM   Dressing Changed New 11/6/2019 12:24 PM   Patient Tolerance Tolerated well 11/6/2019 12:24 PM   Dressing Status Clean;Dry; Intact 10/30/2019  4:00 AM       Wound 11/06/19 Leg Right;Lateral (Active)   Wound Description Granulation tissue;Slough 11/6/2019 12:24 PM   Cristiana-wound Assessment Yellow-brown 11/6/2019 12:24 PM   Wound Length (cm) 1 3 cm 11/6/2019 12:24 PM   Wound Width (cm) 2 cm 11/6/2019 12:24 PM   Wound Depth (cm) 0 1 11/6/2019 12:24 PM   Calculated Wound Area (cm^2) 2 6 cm^2 11/6/2019 12:24 PM   Calculated Wound Volume (cm^3) 0 26 cm^3 11/6/2019 12:24 PM   Drainage Amount Moderate 11/6/2019 12:24 PM   Drainage Description Serosanguineous;Purulent 11/6/2019 12:24 PM   Non-staged Wound Description Full thickness 11/6/2019 12:24 PM   Treatments Cleansed 11/6/2019 12:24 PM   Dressing Calcium Alginate with Silver 11/6/2019 12:24 PM   Wound packed? No 11/6/2019 12:24 PM   Dressing Changed New 11/6/2019 12:24 PM   Patient Tolerance Tolerated well 11/6/2019 12:24 PM     Kenneth Wilcox RN   CWOCN  11/6/2019 12:32

## 2019-11-13 NOTE — WOUND OSTOMY CARE
Progress Note - Wound   Erminio Cheek 80 y o  female MRN: [id number]  Unit/Bed#: -26 Encounter: [id number]      Assessment:   Right anterior lower leg abrasion healed  Right lateral posterior lower leg wound decreased in size  Plan:   1  Discontinue right lower leg:  Dressing change every other day  Cleanse wound with NSS   Apply Nourishing Skin Cream to right lower extremity   Apply Maxorb Ag to open abrasion, cover with gauze   Secure with dinora   Secure with light 4 inch ACE wrap  2   Discontinue:  Right lateral posterior lower leg: Dressing change every other day  Cleanse wound with NSS   Apply Nourishing Skin Cream to right lower extremity   Maxorb Ag to open abrasion, cover with gauze   Secure with dinora   Secure with light 4 inch ACE wrap  3  New order:  Dressing change every other day right lateral posterior lower leg wound  Cleanse wound with NSS   Apply Nourishing Skin Cream to right lower extremity   Apply Silvasorb gel to open area, cover with gauze   Secure with dinora   Secure with light 4 inch ACE wrap  4   Follow up with wound care RN 1 week  Wound 11/06/19 Leg Right;Lateral (Active)   Wound Description Granulation tissue 11/13/2019  1:00 PM   Cristiana-wound Assessment Yellow-brown 11/13/2019  1:00 PM   Wound Length (cm) 0 9 cm 11/13/2019  1:00 PM   Wound Width (cm) 0 6 cm 11/13/2019  1:00 PM   Wound Depth (cm) 0 1 11/13/2019  1:00 PM   Calculated Wound Area (cm^2) 0 54 cm^2 11/13/2019  1:00 PM   Calculated Wound Volume (cm^3) 0 05 cm^3 11/13/2019  1:00 PM   Change in Wound Size % 80 77 11/13/2019  1:00 PM   Drainage Amount Small 11/13/2019  1:00 PM   Drainage Description Serosanguineous 11/13/2019  1:00 PM   Non-staged Wound Description Full thickness 11/13/2019  1:00 PM   Treatments Cleansed 11/13/2019  1:00 PM   Dressing Silvasorb gel 11/13/2019  1:00 PM   Wound packed?  No 11/13/2019  1:00 PM   Dressing Changed New 11/13/2019  1:00 PM   Patient Tolerance Tolerated well 11/13/2019 1:00 PM     Joe Henley RN   CWOCN  11/13/2019 13:06

## 2019-11-20 NOTE — WOUND OSTOMY CARE
Progress Note - Wound   Thena Deeds 80 y o  female MRN: [id number]  Unit/Bed#: J 386-52 Encounter: [id number]      Assessment:   Right lateral posterior lower leg wound decreased in size  Non-pressure  Base of wound 100% granular  Scant drainage  Silvasorb dressing adherent  Will discontinue and switch to a non-adherent  Plan:   1  Discontinue:  Dressing change every other day right lateral posterior lower leg wound  Cleanse wound with NSS   Apply Nourishing Skin Cream to right lower extremity   Apply Silvasorb gel to open area, cover with gauze   Secure with dinora   Secure with light 4 inch ACE wrap  2   New order:  Dressing change daily  Cleanse periwound with soap and water  Apply Nourishing Skin Cream to lower leg  Cleanse wound with NSS  Cover with adaptic (vaseline gauze), followed by 4x4 gauze  Secure with dinora  Cover with Spandagrip size E   3   Nourishing skin cream to bilateral lower extremities daily  4   Spandagrip size E to bilateral lower extremities  On in AM   Off at HS  Apply from base of toes to knee  Replace Spandagrips every 2 weeks  5   Follow up with wound care RN 1 week  Wound 11/06/19 Leg Right;Lateral (Active)   Wound Description Granulation tissue 11/20/2019 12:06 PM   Cristiana-wound Assessment Yellow-brown 11/20/2019 12:06 PM   Wound Length (cm) 0 5 cm 11/20/2019 12:06 PM   Wound Width (cm) 0 2 cm 11/20/2019 12:06 PM   Wound Depth (cm) 0 1 11/20/2019 12:06 PM   Calculated Wound Area (cm^2) 0 1 cm^2 11/20/2019 12:06 PM   Calculated Wound Volume (cm^3) 0 01 cm^3 11/20/2019 12:06 PM   Change in Wound Size % 96 15 11/20/2019 12:06 PM   Drainage Amount Scant 11/20/2019 12:06 PM   Drainage Description Serosanguineous 11/20/2019 12:06 PM   Non-staged Wound Description Full thickness 11/20/2019 12:06 PM   Treatments Cleansed 11/20/2019 12:06 PM   Dressing Non adherent 11/20/2019 12:06 PM   Wound packed?  No 11/20/2019 12:06 PM   Dressing Changed New 11/20/2019 12:06 PM Patient Tolerance Tolerated well 11/20/2019 12:06 PM     Chapis Escobar RN   CWOCN  11/20/2019 12:12

## 2019-11-27 ENCOUNTER — EPISODE CHANGES (OUTPATIENT)
Dept: CASE MANAGEMENT | Facility: OTHER | Age: 84
End: 2019-11-27

## 2022-02-09 NOTE — RESULT NOTES
Verified Results  (1) PT WITH INR [id number] 11:19AM Christine Young    Order Number: [id number]     Test Name Result Flag Reference   INR 2 30 H 0 86-1 16   PT 25 6 seconds H 12 1-14 4 Acute UTI Alcohol abuse